# Patient Record
Sex: MALE | Race: WHITE | NOT HISPANIC OR LATINO | Employment: OTHER | URBAN - METROPOLITAN AREA
[De-identification: names, ages, dates, MRNs, and addresses within clinical notes are randomized per-mention and may not be internally consistent; named-entity substitution may affect disease eponyms.]

---

## 2017-04-11 ENCOUNTER — OFFICE VISIT (OUTPATIENT)
Dept: FAMILY MEDICINE CLINIC | Facility: CLINIC | Age: 57
End: 2017-04-11

## 2017-04-11 VITALS
DIASTOLIC BLOOD PRESSURE: 80 MMHG | SYSTOLIC BLOOD PRESSURE: 110 MMHG | TEMPERATURE: 98.1 F | HEART RATE: 100 BPM | BODY MASS INDEX: 33.36 KG/M2 | RESPIRATION RATE: 16 BRPM | WEIGHT: 233 LBS | OXYGEN SATURATION: 94 % | HEIGHT: 70 IN

## 2017-04-11 DIAGNOSIS — E78.2 MIXED HYPERLIPIDEMIA: Chronic | ICD-10-CM

## 2017-04-11 DIAGNOSIS — R73.9 HYPERGLYCEMIA: ICD-10-CM

## 2017-04-11 DIAGNOSIS — Z12.11 SCREEN FOR COLON CANCER: ICD-10-CM

## 2017-04-11 DIAGNOSIS — R00.0 TACHYCARDIA: ICD-10-CM

## 2017-04-11 DIAGNOSIS — Z12.5 SCREENING PSA (PROSTATE SPECIFIC ANTIGEN): ICD-10-CM

## 2017-04-11 DIAGNOSIS — N28.9 RENAL INSUFFICIENCY: Primary | ICD-10-CM

## 2017-04-11 DIAGNOSIS — E55.9 VITAMIN D DEFICIENCY: Chronic | ICD-10-CM

## 2017-04-11 DIAGNOSIS — Z00.00 LABORATORY EXAMINATION ORDERED AS PART OF A ROUTINE GENERAL MEDICAL EXAMINATION: ICD-10-CM

## 2017-04-11 DIAGNOSIS — Z86.718 HISTORY OF DVT (DEEP VEIN THROMBOSIS): ICD-10-CM

## 2017-04-11 PROCEDURE — 99213 OFFICE O/P EST LOW 20 MIN: CPT | Performed by: PHYSICIAN ASSISTANT

## 2017-04-11 RX ORDER — CLOMIPRAMINE HYDROCHLORIDE 75 MG/1
100 CAPSULE ORAL
COMMUNITY
Start: 2017-03-02 | End: 2019-01-16 | Stop reason: DRUGHIGH

## 2017-04-11 RX ORDER — ASPIRIN 81 MG/1
81 TABLET ORAL DAILY
COMMUNITY

## 2017-04-11 RX ORDER — CLONAZEPAM 0.5 MG/1
TABLET ORAL
COMMUNITY
Start: 2017-03-02

## 2017-04-11 RX ORDER — TRAZODONE HYDROCHLORIDE 50 MG/1
TABLET ORAL
COMMUNITY
Start: 2017-03-14

## 2017-04-11 RX ORDER — FLUVOXAMINE MALEATE 100 MG
TABLET ORAL
COMMUNITY
Start: 2017-03-07

## 2017-04-11 RX ORDER — OLANZAPINE 7.5 MG/1
TABLET ORAL
COMMUNITY
Start: 2017-03-02 | End: 2022-09-26 | Stop reason: SDUPTHER

## 2017-04-11 NOTE — PROGRESS NOTES
Subjective   Ronnie Baron is a 56 y.o. male.     History of Present Illness   Ronnie Baron 56 y.o. male who presents today for routine follow up check and medication refills.  he has a history of   Patient Active Problem List   Diagnosis   • Anxiety   • OCD (obsessive compulsive disorder)   • Severe recurrent major depression with psychotic features   • CKD (chronic kidney disease) stage 3, GFR 30-59 ml/min   • Hyperglycemia   • Hyperlipidemia   • Vitamin D deficiency   .  Since the last visit, he has overall felt well.  He has OCD and sees psych.  he has been compliant with current medications have reviewed them.  The patient denies medication side effects.    Est CC 91  Sees DR Leger for psych  Less hair on legs  Lab Results   Component Value Date    TSH 2.180 07/01/2016     Last free T4 was 1.82;  I will get free T3 this time also  No results found for: CHOL  Lab Results   Component Value Date    TRIG 142 07/01/2016     Lab Results   Component Value Date    HDL 47 07/01/2016     No results found for: LDLCALC  Lab Results   Component Value Date     (H) 07/01/2016     No results found for: HDLLDLRATIO  No components found for: CHOLHDL  Had DVT 7-18-16 left and do not see lab profile for this and do not see f/u doppler;  Will refer Hematology  The following portions of the patient's history were reviewed and updated as appropriate: allergies, current medications, past family history, past medical history, past social history, past surgical history and problem list.  Mom had clots to heart and brain  Heart rate stays above 100 and no symptoms  Had DVT last year and was on Eliquis and has not had f/u for this    Review of Systems   Constitutional: Positive for appetite change and unexpected weight change. Negative for activity change.   HENT: Negative for nosebleeds and trouble swallowing.    Eyes: Negative for pain and visual disturbance.   Respiratory: Negative for chest tightness, shortness of breath  and wheezing.    Cardiovascular: Negative for chest pain and palpitations.   Gastrointestinal: Negative for abdominal pain and blood in stool.   Endocrine: Negative.    Genitourinary: Negative for difficulty urinating and hematuria.   Musculoskeletal: Negative for joint swelling.   Skin: Negative for color change and rash.   Allergic/Immunologic: Negative.    Neurological: Negative for syncope and speech difficulty.   Hematological: Negative for adenopathy.   Psychiatric/Behavioral: Negative for agitation and confusion.   All other systems reviewed and are negative.      Objective   Physical Exam   Constitutional: He is oriented to person, place, and time. He appears well-developed and well-nourished. No distress.   HENT:   Head: Normocephalic and atraumatic.   Eyes: Conjunctivae and EOM are normal. Pupils are equal, round, and reactive to light. Right eye exhibits no discharge. Left eye exhibits no discharge. No scleral icterus.   Neck: Normal range of motion. Neck supple. No tracheal deviation present. No thyromegaly present.   Cardiovascular: Normal rate, regular rhythm, normal heart sounds, intact distal pulses and normal pulses.  Exam reveals no gallop.    No murmur heard.  Pulmonary/Chest: Effort normal and breath sounds normal. No respiratory distress. He has no wheezes. He has no rales.   Musculoskeletal: Normal range of motion.   Neurological: He is alert and oriented to person, place, and time. He exhibits normal muscle tone. Coordination normal.   Skin: Skin is warm. No rash noted. No erythema. No pallor.   Psychiatric: He has a normal mood and affect. His behavior is normal. Judgment and thought content normal.   Nursing note and vitals reviewed.      Assessment/Plan   Ronnie was seen today for anxiety.    Diagnoses and all orders for this visit:    Renal insufficiency  -     Cancel: Hepatitis C antibody  -     Comprehensive metabolic panel  -     Lipid panel  -     CBC and Differential  -     TSH  -      PSA  -     T4, Free  -     T3, Free  -     Vitamin D 25 Hydroxy  -     Hemoglobin A1c  -     HIV-1 / O / 2 Ag / Antibody 4th Generation    Hyperglycemia  -     Cancel: Hepatitis C antibody  -     Comprehensive metabolic panel  -     Lipid panel  -     CBC and Differential  -     TSH  -     PSA  -     T4, Free  -     T3, Free  -     Vitamin D 25 Hydroxy  -     Hemoglobin A1c  -     HIV-1 / O / 2 Ag / Antibody 4th Generation    Vitamin D deficiency  -     Cancel: Hepatitis C antibody  -     Comprehensive metabolic panel  -     Lipid panel  -     CBC and Differential  -     TSH  -     PSA  -     T4, Free  -     T3, Free  -     Vitamin D 25 Hydroxy  -     Hemoglobin A1c  -     HIV-1 / O / 2 Ag / Antibody 4th Generation    Mixed hyperlipidemia  -     Cancel: Hepatitis C antibody  -     Comprehensive metabolic panel  -     Lipid panel  -     CBC and Differential  -     TSH  -     PSA  -     T4, Free  -     T3, Free  -     Vitamin D 25 Hydroxy  -     Hemoglobin A1c  -     HIV-1 / O / 2 Ag / Antibody 4th Generation    Laboratory examination ordered as part of a routine general medical examination  -     Cancel: Hepatitis C antibody  -     Comprehensive metabolic panel  -     Lipid panel  -     CBC and Differential  -     TSH  -     PSA  -     T4, Free  -     T3, Free  -     Vitamin D 25 Hydroxy  -     Hemoglobin A1c  -     HIV-1 / O / 2 Ag / Antibody 4th Generation    Screening PSA (prostate specific antigen)  -     HIV-1 / O / 2 Ag / Antibody 4th Generation    History of DVT (deep vein thrombosis)  -     Duplex Venous Lower Extremity - Bilateral CAR; Future  -     Ambulatory Referral to Hematology / Oncology  -     HIV-1 / O / 2 Ag / Antibody 4th Generation    Tachycardia  -     Ambulatory Referral to Cardiology  -     HIV-1 / O / 2 Ag / Antibody 4th Generation

## 2017-04-11 NOTE — PATIENT INSTRUCTIONS
Need labs and send to psych  Low glycemic index diet  Exercise 30 minutes most days of the week  Make sure you get results on any labs or tests we ordered today  We discussed medications and how to take them as prescribed  Sleep 6-8 hours each night if possible  If you have not signed up for MyChart, please activate your code ASAP from your After Visit Summary today    LDL goal <100  LDL goal if heart disease <70  HDL goal >60  Triglyceride goal <150  BP goal =<130/80  Fasting glucose <100

## 2017-04-12 LAB
25(OH)D3+25(OH)D2 SERPL-MCNC: 41.6 NG/ML (ref 30–100)
ALBUMIN SERPL-MCNC: 4.5 G/DL (ref 3.5–5.2)
ALBUMIN/GLOB SERPL: 1.4 G/DL
ALP SERPL-CCNC: 125 U/L (ref 39–117)
ALT SERPL-CCNC: 40 U/L (ref 1–41)
AST SERPL-CCNC: 25 U/L (ref 1–40)
BASOPHILS # BLD AUTO: 0.03 10*3/MM3 (ref 0–0.2)
BASOPHILS NFR BLD AUTO: 0.6 % (ref 0–1.5)
BILIRUB SERPL-MCNC: 0.2 MG/DL (ref 0.1–1.2)
BUN SERPL-MCNC: 16 MG/DL (ref 6–20)
BUN/CREAT SERPL: 11.9 (ref 7–25)
CALCIUM SERPL-MCNC: 9.9 MG/DL (ref 8.6–10.5)
CHLORIDE SERPL-SCNC: 100 MMOL/L (ref 98–107)
CHOLEST SERPL-MCNC: 235 MG/DL (ref 0–200)
CO2 SERPL-SCNC: 25.2 MMOL/L (ref 22–29)
CREAT SERPL-MCNC: 1.34 MG/DL (ref 0.76–1.27)
EOSINOPHIL # BLD AUTO: 0.09 10*3/MM3 (ref 0–0.7)
EOSINOPHIL NFR BLD AUTO: 1.8 % (ref 0.3–6.2)
ERYTHROCYTE [DISTWIDTH] IN BLOOD BY AUTOMATED COUNT: 13.4 % (ref 11.5–14.5)
GLOBULIN SER CALC-MCNC: 3.2 GM/DL
GLUCOSE SERPL-MCNC: 106 MG/DL (ref 65–99)
HBA1C MFR BLD: 5.5 % (ref 4.8–5.6)
HCT VFR BLD AUTO: 52.7 % (ref 40.4–52.2)
HDLC SERPL-MCNC: 48 MG/DL (ref 40–60)
HGB BLD-MCNC: 17.5 G/DL (ref 13.7–17.6)
HIV 1+2 AB+HIV1 P24 AG SERPL QL IA: NON REACTIVE
IMM GRANULOCYTES # BLD: 0.03 10*3/MM3 (ref 0–0.03)
IMM GRANULOCYTES NFR BLD: 0.6 % (ref 0–0.5)
LDLC SERPL CALC-MCNC: 135 MG/DL (ref 0–100)
LYMPHOCYTES # BLD AUTO: 1.98 10*3/MM3 (ref 0.9–4.8)
LYMPHOCYTES NFR BLD AUTO: 40.5 % (ref 19.6–45.3)
MCH RBC QN AUTO: 31.4 PG (ref 27–32.7)
MCHC RBC AUTO-ENTMCNC: 33.2 G/DL (ref 32.6–36.4)
MCV RBC AUTO: 94.6 FL (ref 79.8–96.2)
MONOCYTES # BLD AUTO: 0.29 10*3/MM3 (ref 0.2–1.2)
MONOCYTES NFR BLD AUTO: 5.9 % (ref 5–12)
NEUTROPHILS # BLD AUTO: 2.47 10*3/MM3 (ref 1.9–8.1)
NEUTROPHILS NFR BLD AUTO: 50.6 % (ref 42.7–76)
PLATELET # BLD AUTO: 215 10*3/MM3 (ref 140–500)
POTASSIUM SERPL-SCNC: 4.9 MMOL/L (ref 3.5–5.2)
PROT SERPL-MCNC: 7.7 G/DL (ref 6–8.5)
PSA SERPL-MCNC: 1.97 NG/ML (ref 0–4)
RBC # BLD AUTO: 5.57 10*6/MM3 (ref 4.6–6)
SODIUM SERPL-SCNC: 140 MMOL/L (ref 136–145)
T3FREE SERPL-MCNC: 3.2 PG/ML (ref 2–4.4)
T4 FREE SERPL-MCNC: 1.09 NG/DL (ref 0.93–1.7)
TRIGL SERPL-MCNC: 258 MG/DL (ref 0–150)
TSH SERPL DL<=0.005 MIU/L-ACNC: 2.13 MIU/ML (ref 0.27–4.2)
VLDLC SERPL CALC-MCNC: 51.6 MG/DL (ref 5–40)
WBC # BLD AUTO: 4.89 10*3/MM3 (ref 4.5–10.7)

## 2017-04-13 ENCOUNTER — RESULTS ENCOUNTER (OUTPATIENT)
Dept: FAMILY MEDICINE CLINIC | Facility: CLINIC | Age: 57
End: 2017-04-13

## 2017-04-13 DIAGNOSIS — Z12.11 SCREEN FOR COLON CANCER: ICD-10-CM

## 2017-04-20 ENCOUNTER — LAB (OUTPATIENT)
Dept: LAB | Facility: HOSPITAL | Age: 57
End: 2017-04-20

## 2017-04-20 ENCOUNTER — CONSULT (OUTPATIENT)
Dept: ONCOLOGY | Facility: CLINIC | Age: 57
End: 2017-04-20

## 2017-04-20 ENCOUNTER — HOSPITAL ENCOUNTER (OUTPATIENT)
Dept: CARDIOLOGY | Facility: HOSPITAL | Age: 57
Discharge: HOME | End: 2017-04-20
Admitting: PHYSICIAN ASSISTANT

## 2017-04-20 VITALS
OXYGEN SATURATION: 95 % | DIASTOLIC BLOOD PRESSURE: 88 MMHG | TEMPERATURE: 97.7 F | HEIGHT: 69 IN | BODY MASS INDEX: 33.98 KG/M2 | SYSTOLIC BLOOD PRESSURE: 132 MMHG | RESPIRATION RATE: 16 BRPM | WEIGHT: 229.4 LBS | HEART RATE: 115 BPM

## 2017-04-20 DIAGNOSIS — Z86.718 HISTORY OF DEEP VEIN THROMBOSIS (DVT) OF LOWER EXTREMITY: Primary | ICD-10-CM

## 2017-04-20 DIAGNOSIS — I82.409 DEEP VEIN THROMBOSIS (DVT) OF LOWER EXTREMITY, UNSPECIFIED CHRONICITY, UNSPECIFIED LATERALITY, UNSPECIFIED VEIN (HCC): Primary | ICD-10-CM

## 2017-04-20 DIAGNOSIS — D75.1 ERYTHROCYTOSIS: ICD-10-CM

## 2017-04-20 DIAGNOSIS — Z86.718 HISTORY OF DVT (DEEP VEIN THROMBOSIS): ICD-10-CM

## 2017-04-20 LAB
ALBUMIN SERPL-MCNC: 4.3 G/DL (ref 3.5–5.2)
ALBUMIN/GLOB SERPL: 1.2 G/DL (ref 1.1–2.4)
ALP SERPL-CCNC: 119 U/L (ref 38–116)
ALT SERPL W P-5'-P-CCNC: 49 U/L (ref 0–41)
ANION GAP SERPL CALCULATED.3IONS-SCNC: 10.4 MMOL/L
AST SERPL-CCNC: 29 U/L (ref 0–40)
BASOPHILS # BLD AUTO: 0.07 10*3/MM3 (ref 0–0.1)
BASOPHILS NFR BLD AUTO: 1.1 % (ref 0–1.1)
BH CV LOW VAS LEFT POPLITEAL SPONT: 1
BH CV LOWER VASCULAR LEFT COMMON FEMORAL AUGMENT: NORMAL
BH CV LOWER VASCULAR LEFT COMMON FEMORAL COMPETENT: NORMAL
BH CV LOWER VASCULAR LEFT COMMON FEMORAL COMPRESS: NORMAL
BH CV LOWER VASCULAR LEFT COMMON FEMORAL PHASIC: NORMAL
BH CV LOWER VASCULAR LEFT COMMON FEMORAL SPONT: NORMAL
BH CV LOWER VASCULAR LEFT DISTAL FEMORAL COMPRESS: NORMAL
BH CV LOWER VASCULAR LEFT GASTRONEMIUS COMPRESS: NORMAL
BH CV LOWER VASCULAR LEFT GREATER SAPH AK COMPRESS: NORMAL
BH CV LOWER VASCULAR LEFT GREATER SAPH BK COMPRESS: NORMAL
BH CV LOWER VASCULAR LEFT MID FEMORAL AUGMENT: NORMAL
BH CV LOWER VASCULAR LEFT MID FEMORAL COMPETENT: NORMAL
BH CV LOWER VASCULAR LEFT MID FEMORAL COMPRESS: NORMAL
BH CV LOWER VASCULAR LEFT MID FEMORAL PHASIC: NORMAL
BH CV LOWER VASCULAR LEFT MID FEMORAL SPONT: NORMAL
BH CV LOWER VASCULAR LEFT PERONEAL COMPRESS: NORMAL
BH CV LOWER VASCULAR LEFT POPLITEAL AUGMENT: NORMAL
BH CV LOWER VASCULAR LEFT POPLITEAL COMPETENT: NORMAL
BH CV LOWER VASCULAR LEFT POPLITEAL COMPRESS: NORMAL
BH CV LOWER VASCULAR LEFT POPLITEAL PHASIC: NORMAL
BH CV LOWER VASCULAR LEFT POPLITEAL SPONT: NORMAL
BH CV LOWER VASCULAR LEFT POSTERIOR TIBIAL COMPRESS: NORMAL
BH CV LOWER VASCULAR LEFT PROXIMAL FEMORAL COMPRESS: NORMAL
BH CV LOWER VASCULAR LEFT SAPHENOFEMORAL JUNCTION AUGMENT: NORMAL
BH CV LOWER VASCULAR LEFT SAPHENOFEMORAL JUNCTION COMPETENT: NORMAL
BH CV LOWER VASCULAR LEFT SAPHENOFEMORAL JUNCTION COMPRESS: NORMAL
BH CV LOWER VASCULAR LEFT SAPHENOFEMORAL JUNCTION PHASIC: NORMAL
BH CV LOWER VASCULAR LEFT SAPHENOFEMORAL JUNCTION SPONT: NORMAL
BH CV LOWER VASCULAR RIGHT COMMON FEMORAL AUGMENT: NORMAL
BH CV LOWER VASCULAR RIGHT COMMON FEMORAL COMPETENT: NORMAL
BH CV LOWER VASCULAR RIGHT COMMON FEMORAL COMPRESS: NORMAL
BH CV LOWER VASCULAR RIGHT COMMON FEMORAL PHASIC: NORMAL
BH CV LOWER VASCULAR RIGHT COMMON FEMORAL SPONT: NORMAL
BH CV LOWER VASCULAR RIGHT DISTAL FEMORAL COMPRESS: NORMAL
BH CV LOWER VASCULAR RIGHT GASTRONEMIUS COMPRESS: NORMAL
BH CV LOWER VASCULAR RIGHT GREATER SAPH AK COMPRESS: NORMAL
BH CV LOWER VASCULAR RIGHT GREATER SAPH BK COMPRESS: NORMAL
BH CV LOWER VASCULAR RIGHT MID FEMORAL AUGMENT: NORMAL
BH CV LOWER VASCULAR RIGHT MID FEMORAL COMPETENT: NORMAL
BH CV LOWER VASCULAR RIGHT MID FEMORAL COMPRESS: NORMAL
BH CV LOWER VASCULAR RIGHT MID FEMORAL PHASIC: NORMAL
BH CV LOWER VASCULAR RIGHT MID FEMORAL SPONT: NORMAL
BH CV LOWER VASCULAR RIGHT PERONEAL COMPRESS: NORMAL
BH CV LOWER VASCULAR RIGHT POPLITEAL AUGMENT: NORMAL
BH CV LOWER VASCULAR RIGHT POPLITEAL COMPETENT: NORMAL
BH CV LOWER VASCULAR RIGHT POPLITEAL COMPRESS: NORMAL
BH CV LOWER VASCULAR RIGHT POPLITEAL PHASIC: NORMAL
BH CV LOWER VASCULAR RIGHT POPLITEAL SPONT: NORMAL
BH CV LOWER VASCULAR RIGHT POSTERIOR TIBIAL COMPRESS: NORMAL
BH CV LOWER VASCULAR RIGHT PROXIMAL FEMORAL COMPRESS: NORMAL
BH CV LOWER VASCULAR RIGHT SAPHENOFEMORAL JUNCTION AUGMENT: NORMAL
BH CV LOWER VASCULAR RIGHT SAPHENOFEMORAL JUNCTION COMPETENT: NORMAL
BH CV LOWER VASCULAR RIGHT SAPHENOFEMORAL JUNCTION COMPRESS: NORMAL
BH CV LOWER VASCULAR RIGHT SAPHENOFEMORAL JUNCTION PHASIC: NORMAL
BH CV LOWER VASCULAR RIGHT SAPHENOFEMORAL JUNCTION SPONT: NORMAL
BH CV POP FLUID COLLECT LEFT: 1
BILIRUB SERPL-MCNC: 0.3 MG/DL (ref 0.1–1.2)
BUN BLD-MCNC: 19 MG/DL (ref 6–20)
BUN/CREAT SERPL: 12.5 (ref 7.3–30)
CALCIUM SPEC-SCNC: 9.6 MG/DL (ref 8.5–10.2)
CHLORIDE SERPL-SCNC: 102 MMOL/L (ref 98–107)
CO2 SERPL-SCNC: 28.6 MMOL/L (ref 22–29)
CREAT BLD-MCNC: 1.52 MG/DL (ref 0.7–1.3)
DEPRECATED RDW RBC AUTO: 41.2 FL (ref 37–49)
EOSINOPHIL # BLD AUTO: 0.16 10*3/MM3 (ref 0–0.36)
EOSINOPHIL NFR BLD AUTO: 2.5 % (ref 1–5)
ERYTHROCYTE [DISTWIDTH] IN BLOOD BY AUTOMATED COUNT: 12.7 % (ref 11.7–14.5)
GFR SERPL CREATININE-BSD FRML MDRD: 48 ML/MIN/1.73
GLOBULIN UR ELPH-MCNC: 3.6 GM/DL (ref 1.8–3.5)
GLUCOSE BLD-MCNC: 90 MG/DL (ref 74–124)
HCT VFR BLD AUTO: 51.5 % (ref 40–49)
HGB BLD-MCNC: 18 G/DL (ref 13.5–16.5)
IMM GRANULOCYTES # BLD: 0.05 10*3/MM3 (ref 0–0.03)
IMM GRANULOCYTES NFR BLD: 0.8 % (ref 0–0.5)
LYMPHOCYTES # BLD AUTO: 1.93 10*3/MM3 (ref 1–3.5)
LYMPHOCYTES NFR BLD AUTO: 30.7 % (ref 20–49)
MCH RBC QN AUTO: 31.3 PG (ref 27–33)
MCHC RBC AUTO-ENTMCNC: 35 G/DL (ref 32–35)
MCV RBC AUTO: 89.4 FL (ref 83–97)
MONOCYTES # BLD AUTO: 0.43 10*3/MM3 (ref 0.25–0.8)
MONOCYTES NFR BLD AUTO: 6.8 % (ref 4–12)
NEUTROPHILS # BLD AUTO: 3.64 10*3/MM3 (ref 1.5–7)
NEUTROPHILS NFR BLD AUTO: 58.1 % (ref 39–75)
NRBC BLD MANUAL-RTO: 0 /100 WBC (ref 0–0)
PLATELET # BLD AUTO: 226 10*3/MM3 (ref 150–375)
PMV BLD AUTO: 10.5 FL (ref 8.9–12.1)
POTASSIUM BLD-SCNC: 4.7 MMOL/L (ref 3.5–4.7)
PROT SERPL-MCNC: 7.9 G/DL (ref 6.3–8)
RBC # BLD AUTO: 5.76 10*6/MM3 (ref 4.3–5.5)
SODIUM BLD-SCNC: 141 MMOL/L (ref 134–145)
WBC NRBC COR # BLD: 6.28 10*3/MM3 (ref 4–10)

## 2017-04-20 PROCEDURE — 80053 COMPREHEN METABOLIC PANEL: CPT | Performed by: INTERNAL MEDICINE

## 2017-04-20 PROCEDURE — 99244 OFF/OP CNSLTJ NEW/EST MOD 40: CPT | Performed by: INTERNAL MEDICINE

## 2017-04-20 PROCEDURE — 36415 COLL VENOUS BLD VENIPUNCTURE: CPT

## 2017-04-20 PROCEDURE — 85306 CLOT INHIBIT PROT S FREE: CPT | Performed by: INTERNAL MEDICINE

## 2017-04-20 PROCEDURE — 81270 JAK2 GENE: CPT | Performed by: INTERNAL MEDICINE

## 2017-04-20 PROCEDURE — 85303 CLOT INHIBIT PROT C ACTIVITY: CPT | Performed by: INTERNAL MEDICINE

## 2017-04-20 PROCEDURE — 93970 EXTREMITY STUDY: CPT

## 2017-04-20 PROCEDURE — 85025 COMPLETE CBC W/AUTO DIFF WBC: CPT

## 2017-04-20 PROCEDURE — 85300 ANTITHROMBIN III ACTIVITY: CPT | Performed by: INTERNAL MEDICINE

## 2017-04-20 NOTE — PROGRESS NOTES
Subjective .     REASON FOR CONSULTATION:  History of right calf vein thrombosis in July 2016, persistent mild erythrocytosis.  Provide an opinion on any further workup or treatment                             REQUESTING PHYSICIAN:  Ness Martinez PA-C    RECORDS OBTAINED:  Records of the patients history including those obtained from the referring provider were reviewed and summarized in detail.    HISTORY OF PRESENT ILLNESS:  The patient is a 56 y.o. year old male her for an initial visit regarding the above issues.  The patient reports that he experienced a right lower extremity DVT around age 15-16 after a leg injury from a skiing accident.  He reports being told to stay off of his feet 4.  Of time and had no further difficulty in that regard.  The accuracy of this is somewhat unclear.  He denies any known family history of venous thrombosis although does note his mother had atrial fibrillation and experienced a CVA.  The patient has a history of anxiety/OCD and has required psychiatric hospitalization at [facility] in December 2015 and subsequently at [facility] in July 2016.  Prior to and during that hospitalization, the patient had become quite sedentary.  He was found on Doppler study 7/18/16 to have a left popliteal fossa fluid collection as well as an acute left lower extremity DVT involving the calf veins (gastrocnemius and soleal veins).  He was seen by the hospitalist and was treated with Eliquis.  He continued on Eliquis for a 6 week period of time in total and then stopped the medication.  It was anticipated that he would undergo a follow-up Doppler however that was not performed.  Fortunately, he had no residual difficulty in terms of any lower extremity pain or swelling.  He is scheduled for a follow-up Doppler study later today.  He does note that around 2 weeks ago he began taking an aspirin 81 mg per day as a general health precaution.    In reviewing the patient's laboratory  studies, labs from 7/1/16 showed a white count of 10.4 with normal differential, hemoglobin 17.2, hematocrit 50.2, MCV 90.5, platelet count 231,000.  On 7/18/16 (at the time the DVT was identified), his hemoglobin was 15.4, hematocrit 45.5.  At his visit with Ness Juan 4/11/17, white count was 4.89, hemoglobin 17.5, hematocrit 52.7, MCV 94.6, platelet count 215,000.  The patient has not been receiving any testosterone replacement.  He quit smoking many years ago in 1992.  He has no known diagnosis of obstructive sleep apnea however his wife reports that he snores significantly at night.  He does have some minor a time somnolence although this may be related to his psychiatric medications.    The patient reports that his psychiatric symptoms are under good control at this point and he does continue routine psychiatric follow-up area he does complain of some paresthesias in his upper legs of that occur occasionally.  He has noticed a loss of hair growth on his distal lower extremities, recent thyroid function studies normal.  He denies any claudication symptoms.        Past Medical History:   Diagnosis Date   • Anxiety    • Chronic kidney disease    • Depression    • Hyperlipidemia    • OCD (obsessive compulsive disorder)    • Vitamin D deficiency      Past Surgical History:   Procedure Laterality Date   • ELBOW PROCEDURE  06/2014       MEDICATIONS    Current Outpatient Prescriptions:   •  aspirin 81 MG EC tablet, Take 81 mg by mouth Daily., Disp: , Rfl:   •  clomiPRAMINE (ANAFRANIL) 75 MG capsule, , Disp: , Rfl:   •  clonazePAM (KlonoPIN) 0.5 MG tablet, , Disp: , Rfl:   •  fluvoxaMINE (LUVOX) 100 MG tablet, , Disp: , Rfl:   •  gabapentin (NEURONTIN) 600 MG tablet, take 1 tablet by mouth three times a day, Disp: , Rfl: 0  •  Multiple Vitamins-Minerals (MENS MULTI VITAMIN & MINERAL PO), Take  by mouth., Disp: , Rfl:   •  OLANZapine (zyPREXA) 7.5 MG tablet, , Disp: , Rfl:   •  traZODone (DESYREL) 50 MG tablet, , Disp: ,  "Rfl:   •  Vitamin D, Cholecalciferol, 1000 UNITS tablet, Take 2,000 Units by mouth daily., Disp: , Rfl:     ALLERGIES:   No Known Allergies    SOCIAL HISTORY:       Social History     Social History   • Marital status:      Spouse name: N/A   • Number of children: N/A   • Years of education: N/A     Occupational History   • Not on file.     Social History Main Topics   • Smoking status: Former Smoker   • Smokeless tobacco: Former User     Quit date: 7/5/1990   • Alcohol use Yes      Comment: occasional   • Drug use: No   • Sexual activity: Not on file     Other Topics Concern   • Not on file     Social History Narrative         FAMILY HISTORY:  Family History   Problem Relation Age of Onset   • Hypertension Mother    • Hypertension Father        REVIEW OF SYSTEMS:  A comprehensive review of systems was performed and was negative except as mentioned above in the history of present illness.    Objective    Vitals:    04/20/17 1004   BP: 132/88   Pulse: 115   Resp: 16   Temp: 97.7 °F (36.5 °C)   TempSrc: Oral   SpO2: 95%   Weight: 229 lb 6.4 oz (104 kg)   Height: 69.29\" (176 cm)  Comment: new height   PainSc: 0-No pain     Current Status 4/20/2017   ECOG score 0      PHYSICAL EXAM:    GENERAL:  Well-developed, well-nourished in no acute distress.   SKIN:  Warm, dry without rashes, purpura or petechiae.  HEAD:  Normocephalic.  EYES:  Pupils equal, round and reactive to light.  EOMs intact.  Conjunctivae normal.  EARS:  Hearing intact.  NOSE:  Septum midline.  No excoriations or nasal discharge.  MOUTH:  Tongue is well-papillated; no stomatitis or ulcers.  Lips normal.  THROAT:  Oropharynx without lesions or exudates.  NECK:  Supple with good range of motion; no thyromegaly or masses, no JVD.  LYMPHATICS:  No cervical, supraclavicular, axillary or inguinal adenopathy.  CHEST:  Lungs clear to percussion and auscultation. Good airflow.  CARDIAC:  Regular rate and rhythm without murmurs, rubs or gallops. Normal " S1,S2.  ABDOMEN:  Soft, nontender with no organomegaly or masses.  EXTREMITIES:  No clubbing, cyanosis or edema.  NEUROLOGICAL:  Cranial Nerves II-XII grossly intact.  No focal neurological deficits.  PSYCHIATRIC:  Normal affect and mood.      RECENT LABS:        WBC   Date Value Ref Range Status   04/20/2017 6.28 4.00 - 10.00 10*3/mm3 Final     Hemoglobin   Date Value Ref Range Status   04/20/2017 18.0 (H) 13.5 - 16.5 g/dL Final     Platelets   Date Value Ref Range Status   04/20/2017 226 150 - 375 10*3/mm3 Final     Lab Results   Component Value Date    GLUCOSE 90 04/20/2017    BUN 19 04/20/2017    CREATININE 1.52 (H) 04/20/2017    EGFRIFNONA 48 (L) 04/20/2017    EGFRIFAFRI 67 04/11/2017    BCR 12.5 04/20/2017    K 4.7 04/20/2017    CO2 28.6 04/20/2017    CALCIUM 9.6 04/20/2017    PROTENTOTREF 7.7 04/11/2017    ALBUMIN 4.30 04/20/2017    LABIL2 1.2 04/20/2017    AST 29 04/20/2017    ALT 49 (H) 04/20/2017         Assessment/Plan    1. Erythrocytosis: As noted above, the patient's labs date back to 7/1/16 at which time he had a hemoglobin of 17.2, hematocrit 50.2.  This did improve into the normal range later in his hospitalization with labs at the time of identification of his left calf DVT 7/18/16 showing a hematocrit of 45.5.  Subsequent labs from 4/11/17 with Ness Martinez showed a hemoglobin of 17.5 with hematocrit 52.7.  His labs today show a persistent elevation in hemoglobin at 18 with hematocrit 51.5.  I suspect that he has a secondary erythrocytosis related to underlying obstructive sleep apnea.  We will, however, pursue additional evaluation given his history of thrombosis to evaluate for polycythemia vera with JAK2 mutation and abdominal ultrasound to rule out splenomegaly.  We will also check an EPO level and renal ultrasound to rule out underlying renal mass.  If this is a secondary erythrocytosis, one would not necessarily expect the modest elevation in hematocrit to precipitate thrombosis alone.  We will  recheck his hematocrit when he returns in 2 weeks.  I would not recommend phlebotomy at this time.  2. Left lower extremity DVT: The patient reports that he experienced a right lower extremity DVT around age 15-16 after having a right lower extremity injury from a skiing incident.  The accuracy of this history is unclear.  He reports being told to stay off of his feet for a period of time and he had no further issues.  During his psychiatric hospitalization in July 2016, he had been quite sedentary.  He was found to have a acute left lower extremity DVT involving the gastrocnemius and soleal veins and also had a left popliteal fossa fluid collection (presumed a Baker's cyst).  He was treated with Eliquis for 6 weeks and then discontinued the medication.  He had no further difficulty with any lower extremity swelling or pain.  He does have some bilateral thigh paresthesias which are likely unrelated.  He has been scheduled for repeat Doppler study later today by Ness Martinez and I certainly agree with proceeding.  There is no clinical evidence of residual no recurrent thrombosis at this time.  He did begin taking aspirin 81 mg a day for general health considerations recently on his own.  This might provide him with some additional benefit.  Given the questionable history of prior DVT occurring in young age, we are proceeding with a hypercoagulable evaluation today.  This may provide benefit for both the patient and his children (4 children with a daughter age 28, 3 sons ages 30, 25, 24).  3. Suspected obstructive sleep apnea: Agents wife reports that he snores significantly.  He does have some mild daytime somnolence.  This is suspected to be the cause of his erythrocytosis.  He will discuss with Ness Martinez possibility of undergoing evaluation.  4. Possible peripheral vascular disease: The patient reports experiencing hair loss in his distal lower extremities.  He has no specific symptoms consistent with claudication  however does have some paresthesias in his upper thighs.  At some point he may require evaluation with MEÑO.    Plan:    1. Labs today with carbon monoxide, EPO level, JAK2 mutation, factor V Leiden gene mutation, prothrombin gene mutation, anti-thrombin 3, protein C and S activity, lupus anticoagulant, anti-beta 2G P1 antibodies, anticardiolipin antibodies.  2. Proceed with previously scheduled lower extremity Doppler study today  3. Continue aspirin 81 mg a day for now  4. Abdominal ultrasound to evaluate for splenomegaly and renal mass  5. M.D. visit in 2 weeks with CBC.

## 2017-04-21 LAB
AT III PPP CHRO-ACNC: 109 % (ref 90–134)
CARDIOLIPIN IGG SER IA-ACNC: <9 GPL U/ML (ref 0–14)
CARDIOLIPIN IGM SER IA-ACNC: <9 MPL U/ML (ref 0–12)
ETHNIC BACKGROUND STATED: 11.1 MIU/ML (ref 2.6–18.5)
PROT C PPP-ACNC: 200 % (ref 86–163)
PROT S ACT/NOR PPP: 122 % (ref 70–127)

## 2017-04-22 LAB
B2 GLYCOPROT1 IGA SER-ACNC: <9 GPI IGA UNITS (ref 0–25)
B2 GLYCOPROT1 IGG SER-ACNC: <9 GPI IGG UNITS (ref 0–20)
B2 GLYCOPROT1 IGM SER-ACNC: <9 GPI IGM UNITS (ref 0–32)

## 2017-04-24 LAB
DRVVT IMM 1:2 NP PPP: 44.7 SEC (ref 0–44)
LA NT PLATELET PPP: 33.8 SEC (ref 0–43.6)
LUPUS ANTICOAGULANT REFLEX: ABNORMAL
SCREEN DRVVT: 1.3 RATIO (ref 0.8–1.2)
SCREEN DRVVT: 54 SEC (ref 0–44)

## 2017-04-25 ENCOUNTER — HOSPITAL ENCOUNTER (OUTPATIENT)
Dept: ULTRASOUND IMAGING | Facility: HOSPITAL | Age: 57
Discharge: HOME | End: 2017-04-25
Attending: INTERNAL MEDICINE | Admitting: INTERNAL MEDICINE

## 2017-04-25 DIAGNOSIS — D75.1 ERYTHROCYTOSIS: ICD-10-CM

## 2017-04-25 DIAGNOSIS — Z86.718 HISTORY OF DEEP VEIN THROMBOSIS (DVT) OF LOWER EXTREMITY: ICD-10-CM

## 2017-04-25 PROCEDURE — 76700 US EXAM ABDOM COMPLETE: CPT

## 2017-04-27 ENCOUNTER — OFFICE VISIT (OUTPATIENT)
Dept: CARDIOLOGY | Facility: CLINIC | Age: 57
End: 2017-04-27

## 2017-04-27 VITALS
SYSTOLIC BLOOD PRESSURE: 130 MMHG | HEART RATE: 108 BPM | WEIGHT: 233 LBS | HEIGHT: 69 IN | DIASTOLIC BLOOD PRESSURE: 94 MMHG | BODY MASS INDEX: 34.51 KG/M2

## 2017-04-27 DIAGNOSIS — R06.83 SNORES: ICD-10-CM

## 2017-04-27 DIAGNOSIS — R00.0 TACHYCARDIA: Primary | ICD-10-CM

## 2017-04-27 LAB
F2 GENE MUT ANL BLD/T: NORMAL
F5 GENE MUT ANL BLD/T: NORMAL
LABORATORY COMMENT REPORT: NORMAL
Lab: NORMAL

## 2017-04-27 PROCEDURE — 99243 OFF/OP CNSLTJ NEW/EST LOW 30: CPT | Performed by: INTERNAL MEDICINE

## 2017-04-27 PROCEDURE — 93000 ELECTROCARDIOGRAM COMPLETE: CPT | Performed by: INTERNAL MEDICINE

## 2017-04-27 NOTE — PROGRESS NOTES
Subjective:     Encounter Date:04/27/2017      Patient ID: Ronnie Baron is a 56 y.o. male.    Chief Complaint:  History of Present Illness    {Common H&P Review Areas:54684}    ROS    Procedures       Objective:     Physical Exam    Lab Review:       Assessment:          Diagnosis Plan   1. Tachycardia  Adult Transthoracic Echo Complete    Ambulatory Referral to Sleep Medicine    Holter Monitor - 24 Hour   2. Snores  Ambulatory Referral to Sleep Medicine    Holter Monitor - 24 Hour          Plan:

## 2017-04-27 NOTE — PROGRESS NOTES
Subjective:     Encounter Date:04/27/2017      Patient ID: Ronnie Baron is a 56 y.o. male.    Chief Complaint:Tachycardia.  History of Present Illness    56-year-old gentleman who presents for evaluation of tachycardia.  Patient's heart rate is noted to be increased in today to 108.  He is also found to have an increased H&H has been referred to the [facility] for that.  With the tachycardia however he was referred here for further evaluation.  Clinically from a car vascular standpoint he does fairly well.  No shortness of breath chest pain dizziness lightheadedness sensation of his heart racing skipped beats or any other problems.  His wife does say he snores quite extensively.  He was seen today for further evaluation.    Review of Systems   Constitution: Positive for malaise/fatigue.   All other systems reviewed and are negative.        ECG 12 Lead  Date/Time: 4/27/2017 3:57 PM  Performed by: YUNIOR BARRAZA  Authorized by: YUNIOR BARRAZA   Previous ECG: no previous ECG available  Rhythm: sinus tachycardia  Clinical impression: non-specific ECG               Objective:     Physical Exam   Constitutional: He is oriented to person, place, and time. He appears well-developed.   HENT:   Head: Normocephalic.   Eyes: Conjunctivae are normal.   Neck: Normal range of motion.   Cardiovascular: Normal rate, regular rhythm and normal heart sounds.    Pulmonary/Chest: Breath sounds normal.   Abdominal: Soft. Bowel sounds are normal.   Musculoskeletal: Normal range of motion. He exhibits no edema.   Neurological: He is alert and oriented to person, place, and time.   Skin: Skin is warm and dry.   Psychiatric: He has a normal mood and affect. His behavior is normal.   Vitals reviewed.      Lab Review:       Assessment:          Diagnosis Plan   1. Tachycardia  Adult Transthoracic Echo Complete    Ambulatory Referral to Sleep Medicine    Holter Monitor - 24 Hour   2. Snores  Ambulatory Referral to Sleep Medicine     Holter Monitor - 24 Hour          Plan:       1.  Tachycardia.  With his hematologic issues I believe he's classically set up for sleep apnea.  I set him up for a sleep study.  I would also do an echocardiogram to rule out any structural heart disease.  I elected to place a Holter monitor to see how his heart rates are averaging in 24 hours.  2.  Severe depression.  He has been hospitalized twice for his severe depression as well as OCD issues.  He said from that aspect he is making significant grounds and the increased heart rate has not been associated with any issues associated with these unfortunate diseases.

## 2017-05-05 LAB — REF LAB TEST METHOD: NORMAL

## 2017-05-09 ENCOUNTER — OFFICE VISIT (OUTPATIENT)
Dept: ONCOLOGY | Facility: CLINIC | Age: 57
End: 2017-05-09

## 2017-05-09 ENCOUNTER — HOSPITAL ENCOUNTER (OUTPATIENT)
Dept: CARDIOLOGY | Facility: HOSPITAL | Age: 57
Discharge: HOME | End: 2017-05-09
Attending: INTERNAL MEDICINE | Admitting: INTERNAL MEDICINE

## 2017-05-09 ENCOUNTER — LAB (OUTPATIENT)
Dept: LAB | Facility: HOSPITAL | Age: 57
End: 2017-05-09

## 2017-05-09 VITALS
WEIGHT: 233 LBS | SYSTOLIC BLOOD PRESSURE: 138 MMHG | HEIGHT: 69 IN | DIASTOLIC BLOOD PRESSURE: 94 MMHG | BODY MASS INDEX: 34.51 KG/M2

## 2017-05-09 VITALS
OXYGEN SATURATION: 95 % | HEIGHT: 69 IN | HEART RATE: 118 BPM | SYSTOLIC BLOOD PRESSURE: 118 MMHG | BODY MASS INDEX: 34.42 KG/M2 | WEIGHT: 232.4 LBS | DIASTOLIC BLOOD PRESSURE: 88 MMHG | RESPIRATION RATE: 16 BRPM | TEMPERATURE: 98 F

## 2017-05-09 DIAGNOSIS — Z86.718 HISTORY OF DEEP VEIN THROMBOSIS (DVT) OF LOWER EXTREMITY: ICD-10-CM

## 2017-05-09 DIAGNOSIS — D75.1 ERYTHROCYTOSIS: Primary | ICD-10-CM

## 2017-05-09 DIAGNOSIS — R00.0 TACHYCARDIA: ICD-10-CM

## 2017-05-09 DIAGNOSIS — D75.1 ERYTHROCYTOSIS: ICD-10-CM

## 2017-05-09 LAB
ASCENDING AORTA: 2.8 CM
BASOPHILS # BLD AUTO: 0.07 10*3/MM3 (ref 0–0.1)
BASOPHILS NFR BLD AUTO: 0.9 % (ref 0–1.1)
BH CV ECHO MEAS - ACS: 2 CM
BH CV ECHO MEAS - AO MAX PG (FULL): 0.56 MMHG
BH CV ECHO MEAS - AO MAX PG: 3.7 MMHG
BH CV ECHO MEAS - AO MEAN PG (FULL): 0.23 MMHG
BH CV ECHO MEAS - AO MEAN PG: 1.9 MMHG
BH CV ECHO MEAS - AO ROOT AREA (BSA CORRECTED): 1.8
BH CV ECHO MEAS - AO ROOT AREA: 11.7 CM^2
BH CV ECHO MEAS - AO ROOT DIAM: 3.9 CM
BH CV ECHO MEAS - AO V2 MAX: 96.4 CM/SEC
BH CV ECHO MEAS - AO V2 MEAN: 66.5 CM/SEC
BH CV ECHO MEAS - AO V2 VTI: 13.2 CM
BH CV ECHO MEAS - AVA(I,A): 3.5 CM^2
BH CV ECHO MEAS - AVA(I,D): 3.5 CM^2
BH CV ECHO MEAS - AVA(V,A): 3.4 CM^2
BH CV ECHO MEAS - AVA(V,D): 3.4 CM^2
BH CV ECHO MEAS - BSA(HAYCOCK): 2.2 M^2
BH CV ECHO MEAS - BSA: 2.2 M^2
BH CV ECHO MEAS - BZI_BMI: 32.5 KILOGRAMS/M^2
BH CV ECHO MEAS - BZI_METRIC_HEIGHT: 175.3 CM
BH CV ECHO MEAS - BZI_METRIC_WEIGHT: 99.8 KG
BH CV ECHO MEAS - CONTRAST EF (2CH): 62 ML/M^2
BH CV ECHO MEAS - CONTRAST EF 4CH: 66 ML/M^2
BH CV ECHO MEAS - EDV(MOD-SP2): 108 ML
BH CV ECHO MEAS - EDV(MOD-SP4): 97 ML
BH CV ECHO MEAS - EDV(TEICH): 120 ML
BH CV ECHO MEAS - EF(CUBED): 69.7 %
BH CV ECHO MEAS - EF(MOD-SP2): 62 %
BH CV ECHO MEAS - EF(MOD-SP4): 66 %
BH CV ECHO MEAS - EF(TEICH): 61 %
BH CV ECHO MEAS - ESV(MOD-SP2): 41 ML
BH CV ECHO MEAS - ESV(MOD-SP4): 33 ML
BH CV ECHO MEAS - ESV(TEICH): 46.8 ML
BH CV ECHO MEAS - FS: 32.8 %
BH CV ECHO MEAS - IVS/LVPW: 1.1
BH CV ECHO MEAS - IVSD: 0.92 CM
BH CV ECHO MEAS - LAT PEAK E' VEL: 7 CM/SEC
BH CV ECHO MEAS - LV DIASTOLIC VOL/BSA (35-75): 45.1 ML/M^2
BH CV ECHO MEAS - LV MASS(C)D: 156.1 GRAMS
BH CV ECHO MEAS - LV MASS(C)DI: 72.6 GRAMS/M^2
BH CV ECHO MEAS - LV MAX PG: 3.2 MMHG
BH CV ECHO MEAS - LV MEAN PG: 1.7 MMHG
BH CV ECHO MEAS - LV SYSTOLIC VOL/BSA (12-30): 15.3 ML/M^2
BH CV ECHO MEAS - LV V1 MAX: 88.8 CM/SEC
BH CV ECHO MEAS - LV V1 MEAN: 61.6 CM/SEC
BH CV ECHO MEAS - LV V1 VTI: 12.7 CM
BH CV ECHO MEAS - LVIDD: 5 CM
BH CV ECHO MEAS - LVIDS: 3.4 CM
BH CV ECHO MEAS - LVLD AP2: 8.2 CM
BH CV ECHO MEAS - LVLD AP4: 8.3 CM
BH CV ECHO MEAS - LVLS AP2: 6.9 CM
BH CV ECHO MEAS - LVLS AP4: 6.7 CM
BH CV ECHO MEAS - LVOT AREA (M): 3.8 CM^2
BH CV ECHO MEAS - LVOT AREA: 3.7 CM^2
BH CV ECHO MEAS - LVOT DIAM: 2.2 CM
BH CV ECHO MEAS - LVPWD: 0.85 CM
BH CV ECHO MEAS - MED PEAK E' VEL: 11 CM/SEC
BH CV ECHO MEAS - MV A DUR: 0.07 SEC
BH CV ECHO MEAS - MV A MAX VEL: 76.2 CM/SEC
BH CV ECHO MEAS - MV DEC SLOPE: 413.3 CM/SEC^2
BH CV ECHO MEAS - MV DEC TIME: 0.13 SEC
BH CV ECHO MEAS - MV E MAX VEL: 55.3 CM/SEC
BH CV ECHO MEAS - MV E/A: 0.73
BH CV ECHO MEAS - MV MAX PG: 4.4 MMHG
BH CV ECHO MEAS - MV MEAN PG: 2 MMHG
BH CV ECHO MEAS - MV P1/2T MAX VEL: 54.8 CM/SEC
BH CV ECHO MEAS - MV P1/2T: 38.8 MSEC
BH CV ECHO MEAS - MV V2 MAX: 104.5 CM/SEC
BH CV ECHO MEAS - MV V2 MEAN: 66.3 CM/SEC
BH CV ECHO MEAS - MV V2 VTI: 14.2 CM
BH CV ECHO MEAS - MVA P1/2T LCG: 4 CM^2
BH CV ECHO MEAS - MVA(P1/2T): 5.7 CM^2
BH CV ECHO MEAS - MVA(VTI): 3.3 CM^2
BH CV ECHO MEAS - PA ACC TIME: 0.09 SEC
BH CV ECHO MEAS - PA MAX PG (FULL): 5.5 MMHG
BH CV ECHO MEAS - PA MAX PG: 8.1 MMHG
BH CV ECHO MEAS - PA PR(ACCEL): 38.6 MMHG
BH CV ECHO MEAS - PA V2 MAX: 142.4 CM/SEC
BH CV ECHO MEAS - PULM A REVS DUR: 0.08 SEC
BH CV ECHO MEAS - PULM A REVS VEL: 39.5 CM/SEC
BH CV ECHO MEAS - PULM DIAS VEL: 64.7 CM/SEC
BH CV ECHO MEAS - PULM S/D: 0.6
BH CV ECHO MEAS - PULM SYS VEL: 39 CM/SEC
BH CV ECHO MEAS - PVA(V,A): 1.8 CM^2
BH CV ECHO MEAS - PVA(V,D): 1.8 CM^2
BH CV ECHO MEAS - QP/QS: 0.85
BH CV ECHO MEAS - RV MAX PG: 2.6 MMHG
BH CV ECHO MEAS - RV MEAN PG: 1.6 MMHG
BH CV ECHO MEAS - RV V1 MAX: 80 CM/SEC
BH CV ECHO MEAS - RV V1 MEAN: 60.1 CM/SEC
BH CV ECHO MEAS - RV V1 VTI: 12.5 CM
BH CV ECHO MEAS - RVOT AREA: 3.2 CM^2
BH CV ECHO MEAS - RVOT DIAM: 2 CM
BH CV ECHO MEAS - SI(AO): 72 ML/M^2
BH CV ECHO MEAS - SI(CUBED): 41.3 ML/M^2
BH CV ECHO MEAS - SI(LVOT): 21.6 ML/M^2
BH CV ECHO MEAS - SI(MOD-SP2): 31.1 ML/M^2
BH CV ECHO MEAS - SI(MOD-SP4): 29.8 ML/M^2
BH CV ECHO MEAS - SI(TEICH): 34 ML/M^2
BH CV ECHO MEAS - SUP REN AO DIAM: 2.3 CM
BH CV ECHO MEAS - SV(AO): 154.8 ML
BH CV ECHO MEAS - SV(CUBED): 88.8 ML
BH CV ECHO MEAS - SV(LVOT): 46.4 ML
BH CV ECHO MEAS - SV(MOD-SP2): 67 ML
BH CV ECHO MEAS - SV(MOD-SP4): 64 ML
BH CV ECHO MEAS - SV(RVOT): 39.6 ML
BH CV ECHO MEAS - SV(TEICH): 73.2 ML
BH CV ECHO MEAS - TAPSE (>1.6): 1.7 CM2
BH CV XLRA - TDI S': 25 CM/SEC
DEPRECATED RDW RBC AUTO: 41 FL (ref 37–49)
E/E' RATIO: 6.5
EOSINOPHIL # BLD AUTO: 0.07 10*3/MM3 (ref 0–0.36)
EOSINOPHIL NFR BLD AUTO: 0.9 % (ref 1–5)
ERYTHROCYTE [DISTWIDTH] IN BLOOD BY AUTOMATED COUNT: 12.5 % (ref 11.7–14.5)
HCT VFR BLD AUTO: 48.2 % (ref 40–49)
HGB BLD-MCNC: 17.3 G/DL (ref 13.5–16.5)
IMM GRANULOCYTES # BLD: 0.05 10*3/MM3 (ref 0–0.03)
IMM GRANULOCYTES NFR BLD: 0.7 % (ref 0–0.5)
LEFT ATRIUM VOLUME INDEX: 16 ML/M2
LV EF 2D ECHO EST: 66 %
LYMPHOCYTES # BLD AUTO: 2.11 10*3/MM3 (ref 1–3.5)
LYMPHOCYTES NFR BLD AUTO: 28.1 % (ref 20–49)
MCH RBC QN AUTO: 31.9 PG (ref 27–33)
MCHC RBC AUTO-ENTMCNC: 35.9 G/DL (ref 32–35)
MCV RBC AUTO: 88.9 FL (ref 83–97)
MONOCYTES # BLD AUTO: 0.47 10*3/MM3 (ref 0.25–0.8)
MONOCYTES NFR BLD AUTO: 6.3 % (ref 4–12)
NEUTROPHILS # BLD AUTO: 4.73 10*3/MM3 (ref 1.5–7)
NEUTROPHILS NFR BLD AUTO: 63.1 % (ref 39–75)
NRBC BLD MANUAL-RTO: 0 /100 WBC (ref 0–0)
PLATELET # BLD AUTO: 207 10*3/MM3 (ref 150–375)
PMV BLD AUTO: 10.5 FL (ref 8.9–12.1)
RBC # BLD AUTO: 5.42 10*6/MM3 (ref 4.3–5.5)
SINUS: 3.9 CM
STJ: 2.9 CM
WBC NRBC COR # BLD: 7.5 10*3/MM3 (ref 4–10)

## 2017-05-09 PROCEDURE — 36416 COLLJ CAPILLARY BLOOD SPEC: CPT | Performed by: INTERNAL MEDICINE

## 2017-05-09 PROCEDURE — 93306 TTE W/DOPPLER COMPLETE: CPT

## 2017-05-09 PROCEDURE — 99214 OFFICE O/P EST MOD 30 MIN: CPT | Performed by: INTERNAL MEDICINE

## 2017-05-09 PROCEDURE — 93306 TTE W/DOPPLER COMPLETE: CPT | Performed by: INTERNAL MEDICINE

## 2017-05-09 PROCEDURE — 85025 COMPLETE CBC W/AUTO DIFF WBC: CPT | Performed by: INTERNAL MEDICINE

## 2017-07-12 ENCOUNTER — RESULTS ENCOUNTER (OUTPATIENT)
Dept: FAMILY MEDICINE CLINIC | Facility: CLINIC | Age: 57
End: 2017-07-12

## 2017-07-12 DIAGNOSIS — Z00.00 LABORATORY EXAMINATION ORDERED AS PART OF A ROUTINE GENERAL MEDICAL EXAMINATION: ICD-10-CM

## 2017-07-12 DIAGNOSIS — R00.0 TACHYCARDIA: ICD-10-CM

## 2017-07-12 DIAGNOSIS — Z12.5 SCREENING PSA (PROSTATE SPECIFIC ANTIGEN): ICD-10-CM

## 2017-07-12 DIAGNOSIS — Z86.718 HISTORY OF DVT (DEEP VEIN THROMBOSIS): ICD-10-CM

## 2017-07-12 DIAGNOSIS — N28.9 RENAL INSUFFICIENCY: ICD-10-CM

## 2017-07-12 DIAGNOSIS — E55.9 VITAMIN D DEFICIENCY: Chronic | ICD-10-CM

## 2017-07-12 DIAGNOSIS — E78.2 MIXED HYPERLIPIDEMIA: Chronic | ICD-10-CM

## 2017-07-12 DIAGNOSIS — R73.9 HYPERGLYCEMIA: ICD-10-CM

## 2017-08-02 ENCOUNTER — LAB (OUTPATIENT)
Dept: LAB | Facility: HOSPITAL | Age: 57
End: 2017-08-02

## 2017-08-02 DIAGNOSIS — Z86.718 HISTORY OF DEEP VEIN THROMBOSIS (DVT) OF LOWER EXTREMITY: ICD-10-CM

## 2017-08-02 DIAGNOSIS — D75.1 ERYTHROCYTOSIS: ICD-10-CM

## 2017-08-02 LAB
BASOPHILS # BLD AUTO: 0.06 10*3/MM3 (ref 0–0.1)
BASOPHILS NFR BLD AUTO: 0.9 % (ref 0–1.1)
DEPRECATED RDW RBC AUTO: 43.2 FL (ref 37–49)
EOSINOPHIL # BLD AUTO: 0.11 10*3/MM3 (ref 0–0.36)
EOSINOPHIL NFR BLD AUTO: 1.7 % (ref 1–5)
ERYTHROCYTE [DISTWIDTH] IN BLOOD BY AUTOMATED COUNT: 12.7 % (ref 11.7–14.5)
HCT VFR BLD AUTO: 47.1 % (ref 40–49)
HGB BLD-MCNC: 16.2 G/DL (ref 13.5–16.5)
IMM GRANULOCYTES # BLD: 0.04 10*3/MM3 (ref 0–0.03)
IMM GRANULOCYTES NFR BLD: 0.6 % (ref 0–0.5)
LYMPHOCYTES # BLD AUTO: 2.08 10*3/MM3 (ref 1–3.5)
LYMPHOCYTES NFR BLD AUTO: 32.9 % (ref 20–49)
MCH RBC QN AUTO: 31.8 PG (ref 27–33)
MCHC RBC AUTO-ENTMCNC: 34.4 G/DL (ref 32–35)
MCV RBC AUTO: 92.4 FL (ref 83–97)
MONOCYTES # BLD AUTO: 0.47 10*3/MM3 (ref 0.25–0.8)
MONOCYTES NFR BLD AUTO: 7.4 % (ref 4–12)
NEUTROPHILS # BLD AUTO: 3.56 10*3/MM3 (ref 1.5–7)
NEUTROPHILS NFR BLD AUTO: 56.5 % (ref 39–75)
NRBC BLD MANUAL-RTO: 0 /100 WBC (ref 0–0)
PLATELET # BLD AUTO: 192 10*3/MM3 (ref 150–375)
PMV BLD AUTO: 10.2 FL (ref 8.9–12.1)
RBC # BLD AUTO: 5.1 10*6/MM3 (ref 4.3–5.5)
WBC NRBC COR # BLD: 6.32 10*3/MM3 (ref 4–10)

## 2017-08-02 PROCEDURE — 36415 COLL VENOUS BLD VENIPUNCTURE: CPT | Performed by: INTERNAL MEDICINE

## 2017-08-02 PROCEDURE — 85025 COMPLETE CBC W/AUTO DIFF WBC: CPT | Performed by: INTERNAL MEDICINE

## 2017-08-04 LAB
LA NT DPL PPP: 42 SEC (ref 0–55)
LA NT DPL/LA NT HPL PPP-RTO: 1.08 RATIO (ref 0–1.4)
LA NT PLATELET PPP: 30.2 SEC (ref 0–51.9)
LUPUS ANTICOAGULANT REFLEX: NORMAL
SCREEN DRVVT: 43 SEC (ref 0–47)
THROMBIN TIME: 20.8 SEC (ref 0–23)

## 2017-08-09 ENCOUNTER — OFFICE VISIT (OUTPATIENT)
Dept: ONCOLOGY | Facility: CLINIC | Age: 57
End: 2017-08-09

## 2017-08-09 ENCOUNTER — APPOINTMENT (OUTPATIENT)
Dept: LAB | Facility: HOSPITAL | Age: 57
End: 2017-08-09

## 2017-08-09 VITALS
RESPIRATION RATE: 14 BRPM | BODY MASS INDEX: 34.9 KG/M2 | DIASTOLIC BLOOD PRESSURE: 80 MMHG | TEMPERATURE: 98 F | HEART RATE: 96 BPM | OXYGEN SATURATION: 97 % | SYSTOLIC BLOOD PRESSURE: 126 MMHG | HEIGHT: 69 IN | WEIGHT: 235.6 LBS

## 2017-08-09 DIAGNOSIS — Z86.718 HISTORY OF DEEP VEIN THROMBOSIS (DVT) OF LOWER EXTREMITY: Primary | ICD-10-CM

## 2017-08-09 DIAGNOSIS — D75.1 ERYTHROCYTOSIS: ICD-10-CM

## 2017-08-09 PROCEDURE — 99213 OFFICE O/P EST LOW 20 MIN: CPT | Performed by: INTERNAL MEDICINE

## 2017-08-09 PROCEDURE — G0463 HOSPITAL OUTPT CLINIC VISIT: HCPCS | Performed by: INTERNAL MEDICINE

## 2017-08-09 NOTE — PROGRESS NOTES
Subjective      REASONS FOR FOLLOWUP:    1. History of left calf vein thrombosis in July 2016 treated with Eliquis ×6 weeks.  Transient positive lupus anticoagulant, does not meet criteria for antiphospholipid syndrome.  2. Right lower extremity DVT around age 15-16 after having a right lower extremity injury from a skiing incident.  3. Erythrocytosis, suspected secondary to obstructive sleep apnea.    HISTORY OF PRESENT ILLNESS:  The patient is a 57 y.o. year old male who is here for follow-up with the above-mentioned history.    History of Present Illness  the patient returns in follow-up today with laboratory studies to review.  He does continue on aspirin 81 mg per day.  In the interval since his last visit, he did not follow through with his sleep study.  He does note that he has been losing here on his bilateral distal lower extremities.  He denies any claudication symptoms.  He has no other complaints.    Past Medical History:   Diagnosis Date   • Alteration in appetite    • Anxiety    • CKD (chronic kidney disease) stage 3, GFR 30-59 ml/min    • Depression     severe recurrent major depression with psychotic features   • DVT (deep venous thrombosis)    • Hyperglycemia    • Hyperlipidemia    • OCD (obsessive compulsive disorder)    • Renal insufficiency    • Tachycardia    • Vitamin D deficiency      Past Surgical History:   Procedure Laterality Date   • ELBOW PROCEDURE  06/2014       Current Outpatient Prescriptions on File Prior to Visit   Medication Sig Dispense Refill   • aspirin 81 MG EC tablet Take 81 mg by mouth Daily.     • clonazePAM (KlonoPIN) 0.5 MG tablet      • fluvoxaMINE (LUVOX) 100 MG tablet      • gabapentin (NEURONTIN) 600 MG tablet take 1 tablet by mouth three times a day  0   • Multiple Vitamins-Minerals (MENS MULTI VITAMIN & MINERAL PO) Take  by mouth.     • OLANZapine (zyPREXA) 7.5 MG tablet      • traZODone (DESYREL) 50 MG tablet      • Vitamin D, Cholecalciferol, 1000 UNITS tablet  Take 2,000 Units by mouth daily.     • clomiPRAMINE (ANAFRANIL) 75 MG capsule 100 mg.       No current facility-administered medications on file prior to visit.        ALLERGIES:   No Known Allergies    Social History     Social History   • Marital status:      Spouse name: Arielle   • Number of children: N/A   • Years of education: High school     Occupational History   •       [employer]     Social History Main Topics   • Smoking status: Former Smoker     Packs/day: 1.00     Types: Cigarettes     Quit date: 1992   • Smokeless tobacco: Former User     Quit date: 7/5/1990   • Alcohol use Yes      Comment: occasional   • Drug use: No   • Sexual activity: Not on file     Other Topics Concern   • Not on file     Social History Narrative     Family History   Problem Relation Age of Onset   • Hypertension Mother    • Stroke Mother    • Hypertension Father    • Heart disease Maternal Grandfather    • Cancer Maternal Grandmother         Review of Systems   Constitutional: Negative for activity change, appetite change, fatigue, fever and unexpected weight change.   HENT: Negative for congestion, mouth sores, nosebleeds, sore throat and voice change.    Respiratory: Negative for cough, shortness of breath and wheezing.    Cardiovascular: Negative for chest pain, palpitations and leg swelling.   Gastrointestinal: Negative for abdominal distention, abdominal pain, blood in stool, constipation, diarrhea, nausea and vomiting.   Endocrine: Negative for cold intolerance and heat intolerance.   Genitourinary: Negative for difficulty urinating, dysuria, frequency and hematuria.   Musculoskeletal: Negative for arthralgias, back pain, joint swelling and myalgias.   Skin: Negative for rash.   Neurological: Negative for dizziness, syncope, weakness, light-headedness, numbness and headaches.   Hematological: Negative for adenopathy. Does not bruise/bleed easily.   Psychiatric/Behavioral: Negative for  confusion and sleep disturbance. The patient is not nervous/anxious.          Objective      Vitals:    08/09/17 1618   BP: 126/80   Pulse: 96   Resp: 14   Temp: 98 °F (36.7 °C)   SpO2: 97%      Current Status 8/9/2017   ECOG score 0       Physical Exam   Constitutional: He is oriented to person, place, and time. He appears well-developed and well-nourished.   HENT:   Mouth/Throat: Oropharynx is clear and moist.   Eyes: Conjunctivae are normal.   Neck: No thyromegaly present.   Cardiovascular: Normal rate and regular rhythm.  Exam reveals no gallop and no friction rub.    No murmur heard.  Pulmonary/Chest: Breath sounds normal. No respiratory distress.   Abdominal: Soft. Bowel sounds are normal. He exhibits no distension. There is no tenderness.   Musculoskeletal: He exhibits no edema.   Lymphadenopathy:        Head (right side): No submandibular adenopathy present.     He has no cervical adenopathy.     He has no axillary adenopathy.        Right: No inguinal and no supraclavicular adenopathy present.        Left: No inguinal and no supraclavicular adenopathy present.   Neurological: He is alert and oriented to person, place, and time. He displays normal reflexes. No cranial nerve deficit. He exhibits normal muscle tone.   Skin: Skin is warm and dry. No rash noted.   Hair loss involving the distal lower extremities   Psychiatric: He has a normal mood and affect. His behavior is normal.       RECENT LABS:  Hematology WBC   Date Value Ref Range Status   08/02/2017 6.32 4.00 - 10.00 10*3/mm3 Final     RBC   Date Value Ref Range Status   08/02/2017 5.10 4.30 - 5.50 10*6/mm3 Final     Hemoglobin   Date Value Ref Range Status   08/02/2017 16.2 13.5 - 16.5 g/dL Final     Hematocrit   Date Value Ref Range Status   08/02/2017 47.1 40.0 - 49.0 % Final     Platelets   Date Value Ref Range Status   08/02/2017 192 150 - 375 10*3/mm3 Final        Lab Results   Component Value Date    GLUCOSE 90 04/20/2017    BUN 19 04/20/2017     CREATININE 1.52 (H) 04/20/2017    EGFRIFNONA 48 (L) 04/20/2017    EGFRIFAFRI 67 04/11/2017    BCR 12.5 04/20/2017    K 4.7 04/20/2017    CO2 28.6 04/20/2017    CALCIUM 9.6 04/20/2017    PROTENTOTREF 7.7 04/11/2017    ALBUMIN 4.30 04/20/2017    LABIL2 1.2 04/20/2017    AST 29 04/20/2017    ALT 49 (H) 04/20/2017     Laboratory studies 4/20/17: Erythropoietin 11.1, JAK2 V617F and exon 12 negative, factor V Leiden negative, prothrombin gene mutation negative, antithrombin %, protein C activity 200%, protein S activity 122%, lupus anticoagulant positive, anti-beta 2G P1 antibodies negative, anticardiolipin antibodies negative.    Lower extremity Doppler 4/20/17 negative for any evidence of thrombosis, there was a left popliteal fluid collection.    Abdominal ultrasound 4/25/17: No evidence of splenomegaly, no evidence of renal mass lesion, suspected hepatic steatosis.    Echocardiogram 5/9/17 ejection fraction 66.8%.    Laboratory studies 8/2/17: Lupus anticoagulant negative    Assessment/Plan      1. Erythrocytosis suspected secondary to obstructive sleep apnea: the patient's labs date back to 7/1/16 at which time he had a hemoglobin of 17.2, hematocrit 50.2. This did improve into the normal range later in his hospitalization with labs at the time of identification of his left calf DVT 7/18/16 showing a hematocrit of 45.5. Subsequent labs from 4/11/17 with Ness Martinez showed a hemoglobin of 17.5 with hematocrit 52.7.  At time of initial evaluation in our office 4/20/17, hemoglobin was 18 with hematocrit 51.3.  He has high suspicion for underlying obstructive sleep apnea as the cause for his mild erythrocytosis.  We did perform additional evaluation 4/20/17 showing a low-normal EPO level at 11.1, negative JAK2 V617F and exon 12 mutation, abdominal ultrasound showing no evidence of splenomegaly, no evidence of renal mass lesion.  It is unlikely that this is related to his history of thrombosis.  I did suggest that he  undergo evaluation for obstructive sleep apnea however he has not yet followed through with this.  Recently however his hematocrit has normalized at 47.1 today.  2. Left lower extremity DVT: The patient reports that he experienced a right lower extremity DVT around age 15-16 after having a right lower extremity injury from a skiing incident. The accuracy of this history is unclear. He reports being told to stay off of his feet for a period of time and he had no further issues. During his psychiatric hospitalization in July 2016, he had been quite sedentary. He was found to have a acute left lower extremity DVT involving the gastrocnemius and soleal veins and also had a left popliteal fossa fluid collection (presumed a Baker's cyst). He was treated with Eliquis for 6 weeks and then discontinued the medication. He had no further difficulty with any lower extremity swelling or pain. He does have some bilateral thigh paresthesias which are likely unrelated. He did begin taking aspirin 81 mg a day for general health considerations on his own. Given the questionable history of prior DVT occurring at a young age, we did proceed with a hypercoagulable evaluation 4/20/17. This may provide benefit for both the patient and his children (4 children with a daughter age 28, 3 sons ages 30, 25, 24). Laboratory studies 4/20/17 showed factor V Leiden negative, prothrombin gene mutation negative, antithrombin %, protein C activity 200%, protein S activity 122%, lupus anticoagulant positive, anti-beta 2G P1 antibodies negative, anticardiolipin antibodies negative.  Due to the positive lupus anticoagulant, he returns today now 3 months later with repeat lupus anticoagulant from 8/2/17 that is negative.  Therefore he does not meet criteria for antiphospholipid syndrome.  We did discuss that today.  He is aware of signs and symptoms of recurrent DVT as well as of pulmonary embolism in the need to seek immediate medical attention  if the symptoms arise.  It appears that he may have some degree of peripheral vascular disease in his lower extremities given his hair loss and due to his prior history of DVT and potential peripheral vascular disease I did ask him to continue for now on aspirin 81 mg per day.  He does not require any specific further follow-up from a hematologic standpoint for this issue.  3. Bilateral lower extremity hair loss: This is concerning for possible underlying peripheral vascular disease.  He does not report any claudication type symptoms.  I did ask him to discuss this with primary care nurse practitioner Ness Martinez in terms of lower extremity arterial studies.    Plan:  1. Continue aspirin 81 mg per day  2. Follow-up with primary care nurse practitioner Ness Martinez in regards to evaluation for peripheral vascular disease  3. We will not schedule any further routine hematologic follow-up at this time.

## 2019-01-16 ENCOUNTER — RESULTS ENCOUNTER (OUTPATIENT)
Dept: FAMILY MEDICINE CLINIC | Facility: CLINIC | Age: 59
End: 2019-01-16

## 2019-01-16 ENCOUNTER — OFFICE VISIT (OUTPATIENT)
Dept: FAMILY MEDICINE CLINIC | Facility: CLINIC | Age: 59
End: 2019-01-16

## 2019-01-16 VITALS
TEMPERATURE: 97.9 F | RESPIRATION RATE: 16 BRPM | BODY MASS INDEX: 34.79 KG/M2 | OXYGEN SATURATION: 95 % | DIASTOLIC BLOOD PRESSURE: 74 MMHG | WEIGHT: 243 LBS | HEART RATE: 100 BPM | SYSTOLIC BLOOD PRESSURE: 114 MMHG | HEIGHT: 70 IN

## 2019-01-16 DIAGNOSIS — R06.83 SNORING: ICD-10-CM

## 2019-01-16 DIAGNOSIS — R00.0 SINUS TACHYCARDIA: ICD-10-CM

## 2019-01-16 DIAGNOSIS — D75.1 ERYTHROCYTOSIS: ICD-10-CM

## 2019-01-16 DIAGNOSIS — E55.9 VITAMIN D DEFICIENCY: Chronic | ICD-10-CM

## 2019-01-16 DIAGNOSIS — Z12.5 SCREENING PSA (PROSTATE SPECIFIC ANTIGEN): ICD-10-CM

## 2019-01-16 DIAGNOSIS — E78.2 MIXED HYPERLIPIDEMIA: Chronic | ICD-10-CM

## 2019-01-16 DIAGNOSIS — R73.9 HYPERGLYCEMIA: Primary | ICD-10-CM

## 2019-01-16 DIAGNOSIS — Z86.59 HISTORY OF DEPRESSION: ICD-10-CM

## 2019-01-16 DIAGNOSIS — F42.9 OBSESSIVE-COMPULSIVE DISORDER, UNSPECIFIED TYPE: ICD-10-CM

## 2019-01-16 DIAGNOSIS — N18.30 CKD (CHRONIC KIDNEY DISEASE) STAGE 3, GFR 30-59 ML/MIN (HCC): Chronic | ICD-10-CM

## 2019-01-16 DIAGNOSIS — L65.9 LOSS OF HAIR: ICD-10-CM

## 2019-01-16 DIAGNOSIS — R73.9 HYPERGLYCEMIA: ICD-10-CM

## 2019-01-16 DIAGNOSIS — R09.89 DECREASED PULSES IN FEET: ICD-10-CM

## 2019-01-16 DIAGNOSIS — Z12.11 SCREEN FOR COLON CANCER: ICD-10-CM

## 2019-01-16 PROCEDURE — 99214 OFFICE O/P EST MOD 30 MIN: CPT | Performed by: PHYSICIAN ASSISTANT

## 2019-01-16 NOTE — PATIENT INSTRUCTIONS
Low glycemic index diet  Exercise 30 minutes most days of the week  Make sure you get results on any labs or tests we ordered today  We discussed medications and how to take them as prescribed  Sleep 6-8 hours each night if possible  If you have not signed up for [employer], please activate your code ASAP from your After Visit Summary today    LDL goal <100  LDL goal if heart disease <70  HDL goal >60  Triglyceride goal <150  BP goal =<130/80  Fasting glucose <100

## 2019-01-16 NOTE — PROGRESS NOTES
Subjective   Rnonie Baron is a 58 y.o. male.     History of Present Illness   Ronnie Baron 58 y.o. male who presents today for routine follow up check and medication refills.  he has a history of   Patient Active Problem List   Diagnosis   • Anxiety   • OCD (obsessive compulsive disorder)   • Severe recurrent major depression with psychotic features (CMS/[facility])   • CKD (chronic kidney disease) stage 3, GFR 30-59 ml/min (CMS/[facility])   • Hyperglycemia   • Hyperlipidemia   • Vitamin D deficiency   • History of deep vein thrombosis (DVT) of lower extremity   • Erythrocytosis   .  Since the last visit, he has overall felt well.  He has Impaired fasting glucose and will continue close lab follow up to watch for DMII, Hyperlipidemia and working on this with diet and exercise and Vitamin D deficiency and will update labs to confirm level is at goal >30.  he has been compliant with current medications have reviewed them.  The patient denies medication side effects.  He is on ASA  Sees Dr. Leger for psych    Results for orders placed or performed in visit on 08/02/17   Lupus Anticoagulant   Result Value Ref Range    Dilute Prothrombin Time(dPT) 42.0 0.0 - 55.0 sec    dPT Confirm Ratio 1.08 0.00 - 1.40 Ratio    Thrombin Time 20.8 0.0 - 23.0 sec    PTT Lupus Anticoagulant 30.2 0.0 - 51.9 sec    Dilute Viper Venom Time 43.0 0.0 - 47.0 sec    Lupus Anticoagulant Reflex Comment:    CBC Auto Differential   Result Value Ref Range    WBC 6.32 4.00 - 10.00 10*3/mm3    RBC 5.10 4.30 - 5.50 10*6/mm3    Hemoglobin 16.2 13.5 - 16.5 g/dL    Hematocrit 47.1 40.0 - 49.0 %    MCV 92.4 83.0 - 97.0 fL    MCH 31.8 27.0 - 33.0 pg    MCHC 34.4 32.0 - 35.0 g/dL    RDW 12.7 11.7 - 14.5 %    RDW-SD 43.2 37.0 - 49.0 fl    MPV 10.2 8.9 - 12.1 fL    Platelets 192 150 - 375 10*3/mm3    Neutrophil % 56.5 39.0 - 75.0 %    Lymphocyte % 32.9 20.0 - 49.0 %    Monocyte % 7.4 4.0 - 12.0 %    Eosinophil % 1.7 1.0 - 5.0 %    Basophil % 0.9 0.0 - 1.1 %    Immature  Grans % 0.6 (H) 0.0 - 0.5 %    Neutrophils, Absolute 3.56 1.50 - 7.00 10*3/mm3    Lymphocytes, Absolute 2.08 1.00 - 3.50 10*3/mm3    Monocytes, Absolute 0.47 0.25 - 0.80 10*3/mm3    Eosinophils, Absolute 0.11 0.00 - 0.36 10*3/mm3    Basophils, Absolute 0.06 0.00 - 0.10 10*3/mm3    Immature Grans, Absolute 0.04 (H) 0.00 - 0.03 10*3/mm3    nRBC 0.0 0.0 - 0.0 /100 WBC   Last chol score 5.4%  Watching renal functions and avoid NSAIDs  Had CXR at  and told heart size enlg---see cardio for f/u  He has been to Dr Castanon for erythrocytosis and from sleep apnea  He is to have sleep study;  Other labs neg  Dr Castanon did suggest seeing vasc doc---no hair on lower ext  Has seen DR Rincon for tachycardia and need f/u and saw him for tachycardia; CXR last mo had mild cardiomegaly noted.    Snores and needs sleep study  The following portions of the patient's history were reviewed and updated as appropriate: allergies, current medications, past family history, past medical history, past social history, past surgical history and problem list.    Review of Systems   Constitutional: Negative for activity change, appetite change and unexpected weight change.   HENT: Negative for nosebleeds and trouble swallowing.    Eyes: Negative for pain and visual disturbance.   Respiratory: Negative for chest tightness, shortness of breath and wheezing.    Cardiovascular: Negative for chest pain and palpitations.   Gastrointestinal: Negative for abdominal pain and blood in stool.   Endocrine: Negative.    Genitourinary: Negative for difficulty urinating and hematuria.   Musculoskeletal: Negative for joint swelling.   Skin: Negative for color change and rash.   Allergic/Immunologic: Negative.    Neurological: Negative for syncope and speech difficulty.   Hematological: Negative for adenopathy.   Psychiatric/Behavioral: Negative for agitation and confusion.   All other systems reviewed and are negative.      Objective   Physical Exam    Constitutional: He is oriented to person, place, and time. He appears well-developed and well-nourished. No distress.   HENT:   Head: Normocephalic and atraumatic.   Eyes: Conjunctivae and EOM are normal. Pupils are equal, round, and reactive to light. Right eye exhibits no discharge. Left eye exhibits no discharge. No scleral icterus.   Neck: Normal range of motion. Neck supple. No tracheal deviation present. No thyromegaly present.   Cardiovascular: Normal rate, regular rhythm, normal heart sounds, intact distal pulses and normal pulses. Exam reveals no gallop.   No murmur heard.  Slightly decreased pulses in feet; sprig of hair only on great toe    tachycardia   Pulmonary/Chest: Effort normal and breath sounds normal. No respiratory distress. He has no wheezes. He has no rales.   Musculoskeletal: Normal range of motion.   Lymphadenopathy:     He has no cervical adenopathy.   Neurological: He is alert and oriented to person, place, and time. He exhibits normal muscle tone. Coordination normal.   Skin: Skin is warm. No rash noted. No erythema. No pallor.   Psychiatric: He has a normal mood and affect. His behavior is normal. Judgment and thought content normal.   Nursing note and vitals reviewed.      Assessment/Plan   Ronnie was seen today for anxiety.    Diagnoses and all orders for this visit:    Hyperglycemia  -     Comprehensive metabolic panel  -     Lipid panel  -     CBC and Differential  -     TSH  -     Hemoglobin A1c  -     T3, Free  -     T4, Free  -     Vitamin B12  -     Folate  -     Vitamin D 25 Hydroxy  -     Cologuard - Stool, Per Rectum; Future  -     PSA Screen  -     Ambulatory Referral to Cardiology  -     Ambulatory Referral to Vascular Surgery    Mixed hyperlipidemia  -     Comprehensive metabolic panel  -     Lipid panel  -     CBC and Differential  -     TSH  -     Hemoglobin A1c  -     T3, Free  -     T4, Free  -     Vitamin B12  -     Folate  -     Vitamin D 25 Hydroxy  -     Cologuard -  Stool, Per Rectum; Future  -     PSA Screen  -     Ambulatory Referral to Cardiology  -     Ambulatory Referral to Vascular Surgery    CKD (chronic kidney disease) stage 3, GFR 30-59 ml/min (CMS/[facility])  -     Comprehensive metabolic panel  -     Lipid panel  -     CBC and Differential  -     TSH  -     Hemoglobin A1c  -     T3, Free  -     T4, Free  -     Vitamin B12  -     Folate  -     Vitamin D 25 Hydroxy  -     Cologuard - Stool, Per Rectum; Future  -     PSA Screen  -     Ambulatory Referral to Cardiology  -     Ambulatory Referral to Vascular Surgery    Vitamin D deficiency  -     Comprehensive metabolic panel  -     Lipid panel  -     CBC and Differential  -     TSH  -     Hemoglobin A1c  -     T3, Free  -     T4, Free  -     Vitamin B12  -     Folate  -     Vitamin D 25 Hydroxy  -     Cologuard - Stool, Per Rectum; Future  -     PSA Screen  -     Ambulatory Referral to Cardiology  -     Ambulatory Referral to Vascular Surgery    Obsessive-compulsive disorder, unspecified type  -     Comprehensive metabolic panel  -     Lipid panel  -     CBC and Differential  -     TSH  -     Hemoglobin A1c  -     T3, Free  -     T4, Free  -     Vitamin B12  -     Folate  -     Vitamin D 25 Hydroxy  -     Cologuard - Stool, Per Rectum; Future  -     PSA Screen  -     Ambulatory Referral to Cardiology  -     Ambulatory Referral to Vascular Surgery    History of depression  -     Comprehensive metabolic panel  -     Lipid panel  -     CBC and Differential  -     TSH  -     Hemoglobin A1c  -     T3, Free  -     T4, Free  -     Vitamin B12  -     Folate  -     Vitamin D 25 Hydroxy  -     Cologuard - Stool, Per Rectum; Future  -     PSA Screen  -     Ambulatory Referral to Cardiology  -     Ambulatory Referral to Vascular Surgery    Screen for colon cancer  -     Comprehensive metabolic panel  -     Lipid panel  -     CBC and Differential  -     TSH  -     Hemoglobin A1c  -     T3, Free  -     T4, Free  -     Vitamin B12  -      Folate  -     Vitamin D 25 Hydroxy  -     Cologuard - Stool, Per Rectum; Future  -     PSA Screen  -     Ambulatory Referral to Cardiology  -     Ambulatory Referral to Vascular Surgery    Screening PSA (prostate specific antigen)  -     Comprehensive metabolic panel  -     Lipid panel  -     CBC and Differential  -     TSH  -     Hemoglobin A1c  -     T3, Free  -     T4, Free  -     Vitamin B12  -     Folate  -     Vitamin D 25 Hydroxy  -     Cologuard - Stool, Per Rectum; Future  -     PSA Screen  -     Ambulatory Referral to Cardiology  -     Ambulatory Referral to Vascular Surgery    Loss of hair  -     Comprehensive metabolic panel  -     Lipid panel  -     CBC and Differential  -     TSH  -     Hemoglobin A1c  -     T3, Free  -     T4, Free  -     Vitamin B12  -     Folate  -     Vitamin D 25 Hydroxy  -     Cologuard - Stool, Per Rectum; Future  -     PSA Screen  -     Ambulatory Referral to Cardiology  -     Ambulatory Referral to Vascular Surgery    Erythrocytosis  -     Comprehensive metabolic panel  -     Lipid panel  -     CBC and Differential  -     TSH  -     Hemoglobin A1c  -     T3, Free  -     T4, Free  -     Vitamin B12  -     Folate  -     Vitamin D 25 Hydroxy  -     Cologuard - Stool, Per Rectum; Future  -     PSA Screen  -     Ambulatory Referral to Cardiology  -     Ambulatory Referral to Vascular Surgery    Sinus tachycardia  -     Comprehensive metabolic panel  -     Lipid panel  -     CBC and Differential  -     TSH  -     Hemoglobin A1c  -     T3, Free  -     T4, Free  -     Vitamin B12  -     Folate  -     Vitamin D 25 Hydroxy  -     Cologuard - Stool, Per Rectum; Future  -     PSA Screen  -     Ambulatory Referral to Cardiology  -     Ambulatory Referral to Vascular Surgery    Decreased pulses in feet  -     Comprehensive metabolic panel  -     Lipid panel  -     CBC and Differential  -     TSH  -     Hemoglobin A1c  -     T3, Free  -     T4, Free  -     Vitamin B12  -     Folate  -      Vitamin D 25 Hydroxy  -     Cologuard - Stool, Per Rectum; Future  -     PSA Screen  -     Ambulatory Referral to Cardiology  -     Ambulatory Referral to Vascular Surgery    Snoring  -     Ambulatory Referral to Sleep Medicine

## 2019-01-17 LAB
25(OH)D3+25(OH)D2 SERPL-MCNC: 59.8 NG/ML (ref 30–100)
ALBUMIN SERPL-MCNC: 4.1 G/DL (ref 3.5–5.2)
ALBUMIN/GLOB SERPL: 1.4 G/DL
ALP SERPL-CCNC: 96 U/L (ref 39–117)
ALT SERPL-CCNC: 37 U/L (ref 1–41)
AST SERPL-CCNC: 21 U/L (ref 1–40)
BASOPHILS # BLD AUTO: 0.03 10*3/MM3 (ref 0–0.2)
BASOPHILS NFR BLD AUTO: 0.5 % (ref 0–1.5)
BILIRUB SERPL-MCNC: <0.2 MG/DL (ref 0.1–1.2)
BUN SERPL-MCNC: 23 MG/DL (ref 6–20)
BUN/CREAT SERPL: 14.5 (ref 7–25)
CALCIUM SERPL-MCNC: 9.7 MG/DL (ref 8.6–10.5)
CHLORIDE SERPL-SCNC: 102 MMOL/L (ref 98–107)
CHOLEST SERPL-MCNC: 296 MG/DL (ref 0–200)
CO2 SERPL-SCNC: 26.3 MMOL/L (ref 22–29)
CREAT SERPL-MCNC: 1.59 MG/DL (ref 0.76–1.27)
EOSINOPHIL # BLD AUTO: 0.23 10*3/MM3 (ref 0–0.7)
EOSINOPHIL NFR BLD AUTO: 3.8 % (ref 0.3–6.2)
ERYTHROCYTE [DISTWIDTH] IN BLOOD BY AUTOMATED COUNT: 13.8 % (ref 11.5–14.5)
FOLATE SERPL-MCNC: >20 NG/ML (ref 4.78–24.2)
GLOBULIN SER CALC-MCNC: 2.9 GM/DL
GLUCOSE SERPL-MCNC: 96 MG/DL (ref 65–99)
HBA1C MFR BLD: 5.7 % (ref 4.8–5.6)
HCT VFR BLD AUTO: 49.4 % (ref 40.4–52.2)
HDLC SERPL-MCNC: 51 MG/DL (ref 40–60)
HGB BLD-MCNC: 16.2 G/DL (ref 13.7–17.6)
IMM GRANULOCYTES # BLD AUTO: 0.04 10*3/MM3 (ref 0–0.03)
IMM GRANULOCYTES NFR BLD AUTO: 0.7 % (ref 0–0.5)
LDLC SERPL CALC-MCNC: 200 MG/DL (ref 0–100)
LYMPHOCYTES # BLD AUTO: 2.74 10*3/MM3 (ref 0.9–4.8)
LYMPHOCYTES NFR BLD AUTO: 45.1 % (ref 19.6–45.3)
MCH RBC QN AUTO: 31.6 PG (ref 27–32.7)
MCHC RBC AUTO-ENTMCNC: 32.8 G/DL (ref 32.6–36.4)
MCV RBC AUTO: 96.3 FL (ref 79.8–96.2)
MONOCYTES # BLD AUTO: 0.47 10*3/MM3 (ref 0.2–1.2)
MONOCYTES NFR BLD AUTO: 7.7 % (ref 5–12)
NEUTROPHILS # BLD AUTO: 2.6 10*3/MM3 (ref 1.9–8.1)
NEUTROPHILS NFR BLD AUTO: 42.9 % (ref 42.7–76)
PLATELET # BLD AUTO: 209 10*3/MM3 (ref 140–500)
POTASSIUM SERPL-SCNC: 4.9 MMOL/L (ref 3.5–5.2)
PROT SERPL-MCNC: 7 G/DL (ref 6–8.5)
PSA SERPL-MCNC: 2.01 NG/ML (ref 0–4)
RBC # BLD AUTO: 5.13 10*6/MM3 (ref 4.6–6)
SODIUM SERPL-SCNC: 141 MMOL/L (ref 136–145)
T3FREE SERPL-MCNC: 3.2 PG/ML (ref 2–4.4)
T4 FREE SERPL-MCNC: 1.14 NG/DL (ref 0.93–1.7)
TRIGL SERPL-MCNC: 223 MG/DL (ref 0–150)
TSH SERPL DL<=0.005 MIU/L-ACNC: 5.65 MIU/ML (ref 0.27–4.2)
VIT B12 SERPL-MCNC: 757 PG/ML (ref 211–946)
VLDLC SERPL CALC-MCNC: 44.6 MG/DL (ref 5–40)
WBC # BLD AUTO: 6.07 10*3/MM3 (ref 4.5–10.7)

## 2019-01-17 NOTE — PROGRESS NOTES
Pt notified via [facility]    PT is coming in  On the 24th    Can he wait on starting the meds till then??

## 2019-01-24 ENCOUNTER — OFFICE VISIT (OUTPATIENT)
Dept: FAMILY MEDICINE CLINIC | Facility: CLINIC | Age: 59
End: 2019-01-24

## 2019-01-24 ENCOUNTER — OFFICE VISIT (OUTPATIENT)
Dept: CARDIOLOGY | Facility: CLINIC | Age: 59
End: 2019-01-24

## 2019-01-24 VITALS
OXYGEN SATURATION: 98 % | DIASTOLIC BLOOD PRESSURE: 78 MMHG | HEIGHT: 70 IN | BODY MASS INDEX: 34.36 KG/M2 | SYSTOLIC BLOOD PRESSURE: 122 MMHG | HEART RATE: 102 BPM | WEIGHT: 240 LBS

## 2019-01-24 VITALS
OXYGEN SATURATION: 99 % | HEART RATE: 111 BPM | HEIGHT: 70 IN | WEIGHT: 238 LBS | SYSTOLIC BLOOD PRESSURE: 118 MMHG | TEMPERATURE: 97.9 F | DIASTOLIC BLOOD PRESSURE: 84 MMHG | BODY MASS INDEX: 34.07 KG/M2

## 2019-01-24 DIAGNOSIS — R73.01 IMPAIRED FASTING GLUCOSE: ICD-10-CM

## 2019-01-24 DIAGNOSIS — I51.7 MILD CARDIOMEGALY: ICD-10-CM

## 2019-01-24 DIAGNOSIS — N18.30 CKD (CHRONIC KIDNEY DISEASE) STAGE 3, GFR 30-59 ML/MIN (HCC): Chronic | ICD-10-CM

## 2019-01-24 DIAGNOSIS — E03.9 ACQUIRED HYPOTHYROIDISM: Primary | ICD-10-CM

## 2019-01-24 DIAGNOSIS — R06.83 SNORES: Primary | ICD-10-CM

## 2019-01-24 DIAGNOSIS — E78.2 MIXED HYPERLIPIDEMIA: Chronic | ICD-10-CM

## 2019-01-24 PROCEDURE — 99214 OFFICE O/P EST MOD 30 MIN: CPT | Performed by: NURSE PRACTITIONER

## 2019-01-24 PROCEDURE — 93000 ELECTROCARDIOGRAM COMPLETE: CPT | Performed by: NURSE PRACTITIONER

## 2019-01-24 PROCEDURE — 99214 OFFICE O/P EST MOD 30 MIN: CPT | Performed by: PHYSICIAN ASSISTANT

## 2019-01-24 RX ORDER — ROSUVASTATIN CALCIUM 10 MG/1
TABLET, COATED ORAL
Qty: 30 TABLET | Refills: 11 | Status: SHIPPED | OUTPATIENT
Start: 2019-01-24 | End: 2019-10-23 | Stop reason: SDUPTHER

## 2019-01-24 RX ORDER — LEVOTHYROXINE SODIUM 0.03 MG/1
25 TABLET ORAL DAILY
Qty: 30 TABLET | Refills: 11 | Status: SHIPPED | OUTPATIENT
Start: 2019-01-24 | End: 2019-12-31 | Stop reason: SDUPTHER

## 2019-01-24 NOTE — PROGRESS NOTES
Subjective   Ronnie Baron is a 58 y.o. male.     History of Present Illness   Ronnie Baron 58 y.o. male who presents today for routine follow up check and medication refills.  he has a history of   Patient Active Problem List   Diagnosis   • Anxiety   • OCD (obsessive compulsive disorder)   • Severe recurrent major depression with psychotic features (CMS/[facility])   • CKD (chronic kidney disease) stage 3, GFR 30-59 ml/min (CMS/HCC)   • Hyperglycemia   • Hyperlipidemia   • Vitamin D deficiency   • History of deep vein thrombosis (DVT) of lower extremity   • Erythrocytosis   • Acquired hypothyroidism   .  Since the last visit, he has overall felt tired.  He has Impaired fasting glucose and will continue close lab follow up to watch for DMII, Hyperlipidemia and here to discuss treatment, Hypothyroidism and will need to update labs for continued treatment, Vitamin D deficiency and well controlled on medication and labs at goal >30 and I will start thyroid med and statin.  What myalgias.  he has been compliant with current medications have reviewed them.  The patient denies medication side effects.  NO FAM hx thyroid  He is getting worked up for sleep apnea  Results for orders placed or performed in visit on 01/16/19   Comprehensive metabolic panel   Result Value Ref Range    Glucose 96 65 - 99 mg/dL    BUN 23 (H) 6 - 20 mg/dL    Creatinine 1.59 (H) 0.76 - 1.27 mg/dL    eGFR Non African Am 45 (L) >60 mL/min/1.73    eGFR African Am 55 (L) >60 mL/min/1.73    BUN/Creatinine Ratio 14.5 7.0 - 25.0    Sodium 141 136 - 145 mmol/L    Potassium 4.9 3.5 - 5.2 mmol/L    Chloride 102 98 - 107 mmol/L    Total CO2 26.3 22.0 - 29.0 mmol/L    Calcium 9.7 8.6 - 10.5 mg/dL    Total Protein 7.0 6.0 - 8.5 g/dL    Albumin 4.10 3.50 - 5.20 g/dL    Globulin 2.9 gm/dL    A/G Ratio 1.4 g/dL    Total Bilirubin <0.2 0.1 - 1.2 mg/dL    Alkaline Phosphatase 96 39 - 117 U/L    AST (SGOT) 21 1 - 40 U/L    ALT (SGPT) 37 1 - 41 U/L   Lipid panel   Result  Value Ref Range    Total Cholesterol 296 (H) 0 - 200 mg/dL    Triglycerides 223 (H) 0 - 150 mg/dL    HDL Cholesterol 51 40 - 60 mg/dL    VLDL Cholesterol 44.6 (H) 5 - 40 mg/dL    LDL Cholesterol  200 (H) 0 - 100 mg/dL   TSH   Result Value Ref Range    TSH 5.650 (H) 0.270 - 4.200 mIU/mL   Hemoglobin A1c   Result Value Ref Range    Hemoglobin A1C 5.70 (H) 4.80 - 5.60 %   T3, Free   Result Value Ref Range    T3, Free 3.2 2.0 - 4.4 pg/mL   T4, Free   Result Value Ref Range    Free T4 1.14 0.93 - 1.70 ng/dL   Vitamin B12   Result Value Ref Range    Vitamin B-12 757 211 - 946 pg/mL   Folate   Result Value Ref Range    Folate >20.00 4.78 - 24.20 ng/mL   Vitamin D 25 Hydroxy   Result Value Ref Range    25 Hydroxy, Vitamin D 59.8 30.0 - 100.0 ng/ml   PSA Screen   Result Value Ref Range    PSA 2.010 0.000 - 4.000 ng/mL   CBC and Differential   Result Value Ref Range    WBC 6.07 4.50 - 10.70 10*3/mm3    RBC 5.13 4.60 - 6.00 10*6/mm3    Hemoglobin 16.2 13.7 - 17.6 g/dL    Hematocrit 49.4 40.4 - 52.2 %    MCV 96.3 (H) 79.8 - 96.2 fL    MCH 31.6 27.0 - 32.7 pg    MCHC 32.8 32.6 - 36.4 g/dL    RDW 13.8 11.5 - 14.5 %    Platelets 209 140 - 500 10*3/mm3    Neutrophil Rel % 42.9 42.7 - 76.0 %    Lymphocyte Rel % 45.1 19.6 - 45.3 %    Monocyte Rel % 7.7 5.0 - 12.0 %    Eosinophil Rel % 3.8 0.3 - 6.2 %    Basophil Rel % 0.5 0.0 - 1.5 %    Neutrophils Absolute 2.60 1.90 - 8.10 10*3/mm3    Lymphocytes Absolute 2.74 0.90 - 4.80 10*3/mm3    Monocytes Absolute 0.47 0.20 - 1.20 10*3/mm3    Eosinophils Absolute 0.23 0.00 - 0.70 10*3/mm3    Basophils Absolute 0.03 0.00 - 0.20 10*3/mm3    Immature Granulocyte Rel % 0.7 (H) 0.0 - 0.5 %    Immature Grans Absolute 0.04 (H) 0.00 - 0.03 10*3/mm3       Creat above 1.5 X 2 now and consult nephrologist  The following portions of the patient's history were reviewed and updated as appropriate: allergies, current medications, past family history, past medical history, past social history, past surgical  history and problem list.    Review of Systems   Constitutional: Positive for fatigue. Negative for activity change, appetite change and unexpected weight change.   HENT: Negative for nosebleeds and trouble swallowing.    Eyes: Negative for pain and visual disturbance.   Respiratory: Negative for chest tightness, shortness of breath and wheezing.    Cardiovascular: Negative for chest pain and palpitations.   Gastrointestinal: Negative for abdominal pain and blood in stool.   Endocrine: Negative.    Genitourinary: Negative for difficulty urinating and hematuria.   Musculoskeletal: Negative for joint swelling.   Skin: Negative for color change and rash.   Allergic/Immunologic: Negative.    Neurological: Negative for syncope and speech difficulty.   Hematological: Negative for adenopathy.   Psychiatric/Behavioral: Negative for agitation and confusion.   All other systems reviewed and are negative.      Objective   Physical Exam   Constitutional: He is oriented to person, place, and time. He appears well-developed and well-nourished. No distress.   HENT:   Head: Normocephalic and atraumatic.   Eyes: Conjunctivae and EOM are normal. Pupils are equal, round, and reactive to light. Right eye exhibits no discharge. Left eye exhibits no discharge. No scleral icterus.   Neck: Normal range of motion. Neck supple. No tracheal deviation present. No thyromegaly present.   Cardiovascular: Normal rate, regular rhythm, normal heart sounds, intact distal pulses and normal pulses. Exam reveals no gallop.   No murmur heard.  Pulmonary/Chest: Effort normal and breath sounds normal. No respiratory distress. He has no wheezes. He has no rales.   Musculoskeletal: Normal range of motion.   Neurological: He is alert and oriented to person, place, and time. He exhibits normal muscle tone. Coordination normal.   Skin: Skin is warm. No rash noted. No erythema. No pallor.   Psychiatric: He has a normal mood and affect. His behavior is normal.  Judgment and thought content normal.   Nursing note and vitals reviewed.      Assessment/Plan   Diagnoses and all orders for this visit:    Acquired hypothyroidism  -     Comprehensive metabolic panel; Future  -     Lipid panel; Future  -     T3, Free; Future  -     T4, Free; Future  -     TSH; Future  -     Ambulatory Referral to Nephrology    Mixed hyperlipidemia  -     Comprehensive metabolic panel; Future  -     Lipid panel; Future  -     T3, Free; Future  -     T4, Free; Future  -     TSH; Future  -     Ambulatory Referral to Nephrology    Impaired fasting glucose  -     Comprehensive metabolic panel; Future  -     Lipid panel; Future  -     T3, Free; Future  -     T4, Free; Future  -     TSH; Future  -     Ambulatory Referral to Nephrology    CKD (chronic kidney disease) stage 3, GFR 30-59 ml/min (CMS/[facility])  -     Comprehensive metabolic panel; Future  -     Lipid panel; Future  -     T3, Free; Future  -     T4, Free; Future  -     TSH; Future  -     Ambulatory Referral to Nephrology    Other orders  -     levothyroxine (SYNTHROID, LEVOTHROID) 25 MCG tablet; Take 1 tablet by mouth Daily. One PO daily before breakfast for thyroid  -     rosuvastatin (CRESTOR) 10 MG tablet; 1/2 tab PO daily for 1 week then one PO daily For cholesterol

## 2019-01-24 NOTE — PROGRESS NOTES
Patient Name: Ronnie Baron  :1960  Age: 58 y.o.  Primary Cardiologist: Chris Rincon MD  Encounter Provider:  CONSUELO Portillo      Chief Complaint:   Chief Complaint   Patient presents with   • Follow-up         HPI  Ronnie Baron is a 58 y.o. male with a history significant for hyperlipidemia, stage III renal disease.  Patient was last seen by Dr. Rincon for tachycardia.  He presents today for routine follow-up.  Patient is new to me but I have reviewed prior medical records.  Patient states that he has done well over the past year to 2 years.  He reports that he was recently seen at an urgent care center for bronchitis and was told that he had cardiomegaly on a chest x-ray.  He presents today for further evaluation.  Patient states that after being treated for the bronchitis he is no longer short of breath.  He denies any chest pain, heart palpitations, swelling, fatigue.  He does note that he was seen by his primary care physician who noted that his lipids were elevated and his TSH was elevated.  He reports that he has an appointment with his primary care physician to get placed on medications for both of these problems.  Patient does note that he never got a sleep study done.  His wife notes that he snores and does have episodes of apnea.      The following portions of the patient's history were reviewed and updated as appropriate: allergies, current medications, past family history, past medical history, past social history, past surgical history and problem list.    Current Outpatient Medications on File Prior to Visit   Medication Sig   • aspirin 81 MG EC tablet Take 81 mg by mouth Daily.   • CLOMIPRAMINE HCL PO Take 100 mg by mouth Every Night.   • clonazePAM (KlonoPIN) 0.5 MG tablet    • fluvoxaMINE (LUVOX) 100 MG tablet    • gabapentin (NEURONTIN) 600 MG tablet take 1 tablet by mouth TWICE a day   • Multiple Vitamins-Minerals (MENS MULTI VITAMIN & MINERAL PO) Take  by mouth.   •  "OLANZapine (zyPREXA) 7.5 MG tablet    • traZODone (DESYREL) 50 MG tablet    • Vitamin D, Cholecalciferol, 1000 UNITS tablet Take 2,000 Units by mouth daily.     No current facility-administered medications on file prior to visit.          Review of Systems   Constitution: Negative for malaise/fatigue.   Cardiovascular: Negative for chest pain and leg swelling.   Respiratory: Positive for snoring. Negative for shortness of breath.    Neurological: Negative for light-headedness.   Psychiatric/Behavioral: Positive for depression. The patient is nervous/anxious.    All other systems reviewed and are negative.      OBJECTIVE:   Vital Signs  Vitals:    01/24/19 1322   BP: 122/78   Pulse: 102   SpO2: 98%     Estimated body mass index is 34.44 kg/m² as calculated from the following:    Height as of this encounter: 177.8 cm (70\").    Weight as of this encounter: 109 kg (240 lb).    Physical Exam   Constitutional: He is oriented to person, place, and time. Vital signs are normal. He appears well-developed and well-nourished.   Eyes: Conjunctivae are normal.   Neck: Carotid bruit is not present.   Cardiovascular: Normal rate, regular rhythm and normal heart sounds.   No murmur heard.  Pulmonary/Chest: Effort normal and breath sounds normal.   Abdominal: Normal appearance.   Musculoskeletal: Normal range of motion.   No pedal edema   Neurological: He is alert and oriented to person, place, and time. GCS eye subscore is 4. GCS verbal subscore is 5. GCS motor subscore is 6.   Skin: Skin is warm and dry.   Psychiatric: He has a normal mood and affect. His speech is normal and behavior is normal. Judgment and thought content normal. Cognition and memory are normal.         ECG 12 Lead  Date/Time: 1/24/2019 1:30 PM  Performed by: Cinthia Vasquez APRN  Authorized by: Cinthia Vasquez APRN   Comparison: compared with previous ECG from 4/27/2017  Rhythm: sinus tachycardia  Rate: tachycardic  BPM: 101  Conduction: conduction " normal  QRS axis: right  Other findings: PRWP  Clinical impression: abnormal ECG            Cardiac Procedures:  1. Echocardiogram 5/9/17: Normal ventricular function.  EF 66%.  Normal left ventricular cavity size and wall thickness.  All left ventricular wall segments contract normally.  2. 24-hour Holter 5/9/17: Relatively benign monitor study.        ASSESSMENT:      Diagnosis Plan   1. Snores  Home Sleep Study   2. Mixed hyperlipidemia     3. Mild cardiomegaly           PLAN OF CARE:     1. Snoring: Patient reports that he never did undergo sleep study and states that he would like to be evaluated for sleep apnea.  Patient's wife notes that he does have episodes of apnea when she is watched him sleep in the past.  He does also snort and has daytime sleepiness.  Home sleep study order placed.  2. Hyperlipidemia: Patient states that he recently had his lipids checked on 1/16/19 and was found to have a total cholesterol 296, triglycerides 233, HDL 51, VLDL 44.6, .  He has appointment with his primary care physician tomorrow to be started on medication for high cholesterol.  3. Mild cardiomegaly: Patient states that he was informed at a primary care Center that he had an enlarged heart on chest x-ray.  Patient had an echocardiogram performed 5/9/17 where his ventricles and atrium were all normal sizes.  Patient does not have any shortness of breath or edema.  Patient does not have a history of hypertension.  I do not have access to the chest x-ray images or report.  It is possible that this could have been from x-ray technique.  Since patient has had a recent echocardiogram with normal chamber sizes and is asymptomatic I will not order any further evaluation.  4. Follow-up with Dr. Rincon in one year or sooner with any problems.          Thank you for allowing me to participate in the care of your patient,      Sincerely,   CONSUELO Portillo  [facility]  01/24/19  2:03 PM    **Dragon  Disclaimer:**  Much of this encounter note is an electronic transcription/translation of spoken language to printed text. The electronic translation of spoken language may permit erroneous, or at times, nonsensical words or phrases to be inadvertently transcribed. Although I have reviewed the note for such errors, some may still exist.

## 2019-01-24 NOTE — PATIENT INSTRUCTIONS
Hypothyroidism  Hypothyroidism is a disorder of the thyroid. The thyroid is a large gland that is located in the lower front of the neck. The thyroid releases hormones that control how the body works. With hypothyroidism, the thyroid does not make enough of these hormones.  What are the causes?  Causes of hypothyroidism may include:  · Viral infections.  · Pregnancy.  · Your own defense system (immune system) attacking your thyroid.  · Certain medicines.  · Birth defects.  · Past radiation treatments to your head or neck.  · Past treatment with radioactive iodine.  · Past surgical removal of part or all of your thyroid.  · Problems with the gland that is located in the center of your brain (pituitary).    What are the signs or symptoms?  Signs and symptoms of hypothyroidism may include:  · Feeling as though you have no energy (lethargy).  · Inability to tolerate cold.  · Weight gain that is not explained by a change in diet or exercise habits.  · Dry skin.  · Coarse hair.  · Menstrual irregularity.  · Slowing of thought processes.  · Constipation.  · Sadness or depression.    How is this diagnosed?  Your health care provider may diagnose hypothyroidism with blood tests and ultrasound tests.  How is this treated?  Hypothyroidism is treated with medicine that replaces the hormones that your body does not make. After you begin treatment, it may take several weeks for symptoms to go away.  Follow these instructions at home:  · Take medicines only as directed by your health care provider.  · If you start taking any new medicines, tell your health care provider.  · Keep all follow-up visits as directed by your health care provider. This is important. As your condition improves, your dosage needs may change. You will need to have blood tests regularly so that your health care provider can watch your condition.  Contact a health care provider if:  · Your symptoms do not get better with treatment.  · You are taking thyroid  replacement medicine and:  ? You sweat excessively.  ? You have tremors.  ? You feel anxious.  ? You lose weight rapidly.  ? You cannot tolerate heat.  ? You have emotional swings.  ? You have diarrhea.  ? You feel weak.  Get help right away if:  · You develop chest pain.  · You develop an irregular heartbeat.  · You develop a rapid heartbeat.  This information is not intended to replace advice given to you by your health care provider. Make sure you discuss any questions you have with your health care provider.  Document Released: 12/18/2006 Document Revised: 05/25/2017 Document Reviewed: 05/05/2015  [employer] Interactive Patient Education © 2018 [employer]    Low glycemic index diet  Exercise 30 minutes most days of the week  Make sure you get results on any labs or tests we ordered today  We discussed medications and how to take them as prescribed  Sleep 6-8 hours each night if possible  If you have not signed up for [employer], please activate your code ASAP from your After Visit Summary today    LDL goal <100  LDL goal if heart disease <70  HDL goal >60  Triglyceride goal <150  BP goal =<130/80  Fasting glucose <100    Labs 2mos

## 2019-02-26 ENCOUNTER — HOSPITAL ENCOUNTER (OUTPATIENT)
Dept: SLEEP MEDICINE | Facility: HOSPITAL | Age: 59
Discharge: HOME | End: 2019-02-26
Admitting: NURSE PRACTITIONER

## 2019-02-26 DIAGNOSIS — R06.83 SNORES: ICD-10-CM

## 2019-02-26 PROCEDURE — 95806 SLEEP STUDY UNATT&RESP EFFT: CPT

## 2019-02-26 PROCEDURE — 95806 SLEEP STUDY UNATT&RESP EFFT: CPT | Performed by: INTERNAL MEDICINE

## 2019-03-04 ENCOUNTER — TELEPHONE (OUTPATIENT)
Dept: SLEEP MEDICINE | Facility: HOSPITAL | Age: 59
End: 2019-03-04

## 2019-03-04 NOTE — TELEPHONE ENCOUNTER
Left message for pt at both home and mobile numbers about HST results and Dr. Telles's recommendation for set up with CPAP device.  Asked pt to call back to sleep lab to discuss results in detail and to make compliance appointment.  Set up faxed to [facility] with orders for oximetry. link

## 2019-03-05 ENCOUNTER — TELEPHONE (OUTPATIENT)
Dept: SLEEP MEDICINE | Facility: HOSPITAL | Age: 59
End: 2019-03-05

## 2019-03-05 NOTE — TELEPHONE ENCOUNTER
Spoke to pt's wife about SS results and CPAP setup. Orders were faxed to [facility] yesterday. Pt's compliance appt set for 4/24/19 at 11:15.  Pt does not often answer his cell phone.  Pt's wife states she can be contacted at work Chloe Baron [phone]. [email or web address]

## 2019-03-07 ENCOUNTER — TRANSCRIBE ORDERS (OUTPATIENT)
Dept: ADMINISTRATIVE | Facility: HOSPITAL | Age: 59
End: 2019-03-07

## 2019-03-07 DIAGNOSIS — N18.9 CHRONIC KIDNEY DISEASE, UNSPECIFIED CKD STAGE: Primary | ICD-10-CM

## 2019-04-09 DIAGNOSIS — N18.30 CKD (CHRONIC KIDNEY DISEASE) STAGE 3, GFR 30-59 ML/MIN (HCC): Chronic | ICD-10-CM

## 2019-04-09 DIAGNOSIS — E78.2 MIXED HYPERLIPIDEMIA: Chronic | ICD-10-CM

## 2019-04-09 DIAGNOSIS — R73.01 IMPAIRED FASTING GLUCOSE: ICD-10-CM

## 2019-04-09 DIAGNOSIS — E03.9 ACQUIRED HYPOTHYROIDISM: ICD-10-CM

## 2019-04-18 ENCOUNTER — HOSPITAL ENCOUNTER (OUTPATIENT)
Dept: OTHER | Facility: HOSPITAL | Age: 59
Discharge: HOME | End: 2019-04-18

## 2019-04-18 LAB
ALBUMIN SERPL-MCNC: 3.9 G/DL (ref 3.5–5)
ALBUMIN/GLOB SERPL: 1.2 {RATIO} (ref 1.4–2.6)
ALP SERPL-CCNC: 104 U/L (ref 56–119)
ALT SERPL-CCNC: 30 U/L (ref 10–40)
ANION GAP SERPL CALC-SCNC: 16 MMOL/L (ref 8–19)
AST SERPL-CCNC: 21 U/L (ref 15–50)
BILIRUB SERPL-MCNC: 0.23 MG/DL (ref 0.2–1.3)
BUN SERPL-MCNC: 20 MG/DL (ref 5–25)
BUN/CREAT SERPL: 13 {RATIO} (ref 6–20)
CALCIUM SERPL-MCNC: 9.1 MG/DL (ref 8.7–10.4)
CHLORIDE SERPL-SCNC: 102 MMOL/L (ref 99–111)
CHOLEST SERPL-MCNC: 145 MG/DL (ref 107–200)
CHOLEST/HDLC SERPL: 3.2 {RATIO} (ref 3–6)
CONV CO2: 25 MMOL/L (ref 22–32)
CONV TOTAL PROTEIN: 7.1 G/DL (ref 6.3–8.2)
CREAT UR-MCNC: 1.58 MG/DL (ref 0.7–1.2)
GFR SERPLBLD BASED ON 1.73 SQ M-ARVRAT: 48 ML/MIN/{1.73_M2}
GLOBULIN UR ELPH-MCNC: 3.2 G/DL (ref 2–3.5)
GLUCOSE SERPL-MCNC: 96 MG/DL (ref 70–99)
HDLC SERPL-MCNC: 46 MG/DL (ref 40–60)
LDLC SERPL CALC-MCNC: 67 MG/DL (ref 70–100)
OSMOLALITY SERPL CALC.SUM OF ELEC: 290 MOSM/KG (ref 273–304)
POTASSIUM SERPL-SCNC: 4.2 MMOL/L (ref 3.5–5.3)
SODIUM SERPL-SCNC: 139 MMOL/L (ref 135–147)
T4 FREE SERPL-MCNC: 1.2 NG/DL (ref 0.9–1.8)
TRIGL SERPL-MCNC: 161 MG/DL (ref 40–150)
TSH SERPL-ACNC: 1.98 M[IU]/L (ref 0.27–4.2)
VLDLC SERPL-MCNC: 32 MG/DL (ref 5–37)

## 2019-04-19 LAB — T3FREE SERPL-MCNC: 3.3 PG/ML (ref 2–4.4)

## 2019-04-24 ENCOUNTER — OFFICE VISIT (OUTPATIENT)
Dept: FAMILY MEDICINE CLINIC | Facility: CLINIC | Age: 59
End: 2019-04-24

## 2019-04-24 ENCOUNTER — OFFICE VISIT (OUTPATIENT)
Dept: SLEEP MEDICINE | Facility: HOSPITAL | Age: 59
End: 2019-04-24

## 2019-04-24 VITALS
WEIGHT: 249 LBS | BODY MASS INDEX: 35.65 KG/M2 | OXYGEN SATURATION: 94 % | DIASTOLIC BLOOD PRESSURE: 80 MMHG | HEIGHT: 70 IN | TEMPERATURE: 97.8 F | RESPIRATION RATE: 16 BRPM | HEART RATE: 88 BPM | SYSTOLIC BLOOD PRESSURE: 130 MMHG

## 2019-04-24 VITALS — HEIGHT: 70 IN | BODY MASS INDEX: 35.24 KG/M2 | WEIGHT: 246.2 LBS

## 2019-04-24 DIAGNOSIS — E78.2 MIXED HYPERLIPIDEMIA: Chronic | ICD-10-CM

## 2019-04-24 DIAGNOSIS — G47.14 HYPERSOMNIA DUE TO MEDICAL CONDITION: Primary | ICD-10-CM

## 2019-04-24 DIAGNOSIS — IMO0002 SLEEP-RELATED HYPOVENTILATION: ICD-10-CM

## 2019-04-24 DIAGNOSIS — G47.33 OBSTRUCTIVE SLEEP APNEA: ICD-10-CM

## 2019-04-24 DIAGNOSIS — E55.9 VITAMIN D DEFICIENCY: Chronic | ICD-10-CM

## 2019-04-24 DIAGNOSIS — R73.9 HYPERGLYCEMIA: ICD-10-CM

## 2019-04-24 DIAGNOSIS — E03.9 HYPOTHYROIDISM, ACQUIRED: Primary | ICD-10-CM

## 2019-04-24 DIAGNOSIS — Z86.718 HISTORY OF DEEP VEIN THROMBOSIS (DVT) OF LOWER EXTREMITY: ICD-10-CM

## 2019-04-24 DIAGNOSIS — E66.01 CLASS 2 SEVERE OBESITY DUE TO EXCESS CALORIES WITH SERIOUS COMORBIDITY AND BODY MASS INDEX (BMI) OF 35.0 TO 35.9 IN ADULT (HCC): ICD-10-CM

## 2019-04-24 DIAGNOSIS — N18.30 CKD (CHRONIC KIDNEY DISEASE) STAGE 3, GFR 30-59 ML/MIN (HCC): Chronic | ICD-10-CM

## 2019-04-24 PROCEDURE — 99243 OFF/OP CNSLTJ NEW/EST LOW 30: CPT | Performed by: INTERNAL MEDICINE

## 2019-04-24 PROCEDURE — 99213 OFFICE O/P EST LOW 20 MIN: CPT | Performed by: PHYSICIAN ASSISTANT

## 2019-04-24 PROCEDURE — G0463 HOSPITAL OUTPT CLINIC VISIT: HCPCS

## 2019-04-24 NOTE — PROGRESS NOTES
Subjective   Ronnie Baron is a 58 y.o. male.     History of Present Illness   Ronnie Baron 58 y.o. male who presents today for routine follow up check and medication refills.  he has a history of   Patient Active Problem List   Diagnosis   • Anxiety   • OCD (obsessive compulsive disorder)   • Severe recurrent major depression with psychotic features (CMS/[facility])   • CKD (chronic kidney disease) stage 3, GFR 30-59 ml/min (CMS/[facility])   • Hyperglycemia   • Hyperlipidemia   • Vitamin D deficiency   • History of deep vein thrombosis (DVT) of lower extremity   • Erythrocytosis   • Hypothyroidism, acquired   • Obstructive sleep apnea   .  Since the last visit, he has overall felt well.  He has Impaired fasting glucose and will continue close lab follow up to watch for DMII, Hyperlipidemia and is well controlled on medication and Hypothyroidism and is well controlled on Rx.  Labs are in desired treatment range.  he has been compliant with current medications have reviewed them.  The patient denies medication side effects.  Labs for me yearly    Stay away from NSAIDs  Results for orders placed or performed in visit on 01/16/19   Comprehensive metabolic panel   Result Value Ref Range    Glucose 96 65 - 99 mg/dL    BUN 23 (H) 6 - 20 mg/dL    Creatinine 1.59 (H) 0.76 - 1.27 mg/dL    eGFR Non African Am 45 (L) >60 mL/min/1.73    eGFR African Am 55 (L) >60 mL/min/1.73    BUN/Creatinine Ratio 14.5 7.0 - 25.0    Sodium 141 136 - 145 mmol/L    Potassium 4.9 3.5 - 5.2 mmol/L    Chloride 102 98 - 107 mmol/L    Total CO2 26.3 22.0 - 29.0 mmol/L    Calcium 9.7 8.6 - 10.5 mg/dL    Total Protein 7.0 6.0 - 8.5 g/dL    Albumin 4.10 3.50 - 5.20 g/dL    Globulin 2.9 gm/dL    A/G Ratio 1.4 g/dL    Total Bilirubin <0.2 0.1 - 1.2 mg/dL    Alkaline Phosphatase 96 39 - 117 U/L    AST (SGOT) 21 1 - 40 U/L    ALT (SGPT) 37 1 - 41 U/L   Lipid panel   Result Value Ref Range    Total Cholesterol 296 (H) 0 - 200 mg/dL    Triglycerides 223 (H) 0 - 150 mg/dL     HDL Cholesterol 51 40 - 60 mg/dL    VLDL Cholesterol 44.6 (H) 5 - 40 mg/dL    LDL Cholesterol  200 (H) 0 - 100 mg/dL   TSH   Result Value Ref Range    TSH 5.650 (H) 0.270 - 4.200 mIU/mL   Hemoglobin A1c   Result Value Ref Range    Hemoglobin A1C 5.70 (H) 4.80 - 5.60 %   T3, Free   Result Value Ref Range    T3, Free 3.2 2.0 - 4.4 pg/mL   T4, Free   Result Value Ref Range    Free T4 1.14 0.93 - 1.70 ng/dL   Vitamin B12   Result Value Ref Range    Vitamin B-12 757 211 - 946 pg/mL   Folate   Result Value Ref Range    Folate >20.00 4.78 - 24.20 ng/mL   Vitamin D 25 Hydroxy   Result Value Ref Range    25 Hydroxy, Vitamin D 59.8 30.0 - 100.0 ng/ml   PSA Screen   Result Value Ref Range    PSA 2.010 0.000 - 4.000 ng/mL   CBC and Differential   Result Value Ref Range    WBC 6.07 4.50 - 10.70 10*3/mm3    RBC 5.13 4.60 - 6.00 10*6/mm3    Hemoglobin 16.2 13.7 - 17.6 g/dL    Hematocrit 49.4 40.4 - 52.2 %    MCV 96.3 (H) 79.8 - 96.2 fL    MCH 31.6 27.0 - 32.7 pg    MCHC 32.8 32.6 - 36.4 g/dL    RDW 13.8 11.5 - 14.5 %    Platelets 209 140 - 500 10*3/mm3    Neutrophil Rel % 42.9 42.7 - 76.0 %    Lymphocyte Rel % 45.1 19.6 - 45.3 %    Monocyte Rel % 7.7 5.0 - 12.0 %    Eosinophil Rel % 3.8 0.3 - 6.2 %    Basophil Rel % 0.5 0.0 - 1.5 %    Neutrophils Absolute 2.60 1.90 - 8.10 10*3/mm3    Lymphocytes Absolute 2.74 0.90 - 4.80 10*3/mm3    Monocytes Absolute 0.47 0.20 - 1.20 10*3/mm3    Eosinophils Absolute 0.23 0.00 - 0.70 10*3/mm3    Basophils Absolute 0.03 0.00 - 0.20 10*3/mm3    Immature Granulocyte Rel % 0.7 (H) 0.0 - 0.5 %    Immature Grans Absolute 0.04 (H) 0.00 - 0.03 10*3/mm3       Sees cardio next Jan  Just had f/u on sleep apnea; on Cpap now and feels better  I recently started thyroid Rx  Sees DR. Leger    I did vasc and saw DR Hidalgo per Dr Castanon note August; and negative ABIs  I do have him seeing Nephrologist d/t creat elevation; having renal u/s and no change to meds    Had labs at [facility]; 4-18-19 and note ALT  30, LDL 67, creat 1.58, TSH 1.9 (this was 5.6), free T4 1.2   The following portions of the patient's history were reviewed and updated as appropriate: allergies, current medications, past family history, past medical history, past social history, past surgical history and problem list.    Review of Systems   Constitutional: Negative for activity change, appetite change and unexpected weight change.   HENT: Negative for nosebleeds and trouble swallowing.    Eyes: Negative for pain and visual disturbance.   Respiratory: Negative for chest tightness, shortness of breath and wheezing.    Cardiovascular: Negative for chest pain and palpitations.   Gastrointestinal: Negative for abdominal pain and blood in stool.   Endocrine: Negative.    Genitourinary: Negative for difficulty urinating and hematuria.   Musculoskeletal: Negative for joint swelling.   Skin: Negative for color change and rash.   Allergic/Immunologic: Negative.    Neurological: Negative for syncope and speech difficulty.   Hematological: Negative for adenopathy.   Psychiatric/Behavioral: Negative for agitation and confusion.   All other systems reviewed and are negative.      Objective   Physical Exam   Constitutional: He is oriented to person, place, and time. He appears well-developed and well-nourished. No distress.   HENT:   Head: Normocephalic and atraumatic.   Eyes: Conjunctivae and EOM are normal. Pupils are equal, round, and reactive to light. Right eye exhibits no discharge. Left eye exhibits no discharge. No scleral icterus.   Neck: Normal range of motion. Neck supple. No tracheal deviation present. No thyromegaly present.   Cardiovascular: Normal rate, regular rhythm, normal heart sounds, intact distal pulses and normal pulses. Exam reveals no gallop.   No murmur heard.  Pulmonary/Chest: Effort normal and breath sounds normal. No respiratory distress. He has no wheezes. He has no rales.   Musculoskeletal: Normal range of motion.   Neurological:  He is alert and oriented to person, place, and time. He exhibits normal muscle tone. Coordination normal.   Skin: Skin is warm. No rash noted. No erythema. No pallor.   Psychiatric: He has a normal mood and affect. His behavior is normal. Judgment and thought content normal.   Nursing note and vitals reviewed.      Assessment/Plan   Ronnie was seen today for hypothyroidism.    Diagnoses and all orders for this visit:    Mixed hyperlipidemia    Hypothyroidism, acquired    CKD (chronic kidney disease) stage 3, GFR 30-59 ml/min (CMS/[facility])    History of deep vein thrombosis (DVT) of lower extremity    Vitamin D deficiency    Hyperglycemia    Obstructive sleep apnea        In summary, Ronnie Baron, was seen today.  he was seen for  Impaired fasting glucose and will continue close lab follow up to watch for DMII, Hyperlipidemia and is well controlled on medication, Hypothyroidism and is well controlled on Rx.  Labs are in desired treatment range and Vitamin D deficiency and well controlled on medication and labs at goal >30,    Stay off NSAID  Labs 6 mos

## 2019-04-24 NOTE — PATIENT INSTRUCTIONS
Low glycemic index diet  Exercise 30 minutes most days of the week  Make sure you get results on any labs or tests we ordered today  We discussed medications and how to take them as prescribed  Sleep 6-8 hours each night if possible  If you have not signed up for [employer], please activate your code ASAP from your After Visit Summary today    LDL goal <100  LDL goal if heart disease <70  HDL goal >60  Triglyceride goal <150  BP goal =<130/80  Fasting glucose <100

## 2019-05-03 PROBLEM — E66.812 CLASS 2 SEVERE OBESITY DUE TO EXCESS CALORIES WITH SERIOUS COMORBIDITY AND BODY MASS INDEX (BMI) OF 35.0 TO 35.9 IN ADULT: Status: ACTIVE | Noted: 2019-05-03

## 2019-05-03 PROBLEM — G47.14 HYPERSOMNIA DUE TO MEDICAL CONDITION: Status: ACTIVE | Noted: 2019-05-03

## 2019-05-03 PROBLEM — E66.01 CLASS 2 SEVERE OBESITY DUE TO EXCESS CALORIES WITH SERIOUS COMORBIDITY AND BODY MASS INDEX (BMI) OF 35.0 TO 35.9 IN ADULT: Status: ACTIVE | Noted: 2019-05-03

## 2019-05-03 PROBLEM — IMO0002 SLEEP-RELATED HYPOVENTILATION: Status: ACTIVE | Noted: 2019-05-03

## 2019-05-07 ENCOUNTER — TELEPHONE (OUTPATIENT)
Dept: SLEEP MEDICINE | Facility: HOSPITAL | Age: 59
End: 2019-05-07

## 2019-05-21 ENCOUNTER — HOSPITAL ENCOUNTER (OUTPATIENT)
Dept: ULTRASOUND IMAGING | Facility: HOSPITAL | Age: 59
End: 2019-05-21

## 2019-10-21 ENCOUNTER — HOSPITAL ENCOUNTER (OUTPATIENT)
Dept: OTHER | Facility: HOSPITAL | Age: 59
Discharge: HOME | End: 2019-10-21

## 2019-10-21 LAB
25(OH)D3 SERPL-MCNC: 46.2 NG/ML (ref 30–100)
ALBUMIN SERPL-MCNC: 4.2 G/DL (ref 3.5–5)
ALBUMIN/GLOB SERPL: 1.4 {RATIO} (ref 1.4–2.6)
ALP SERPL-CCNC: 115 U/L (ref 56–119)
ALT SERPL-CCNC: 47 U/L (ref 10–40)
ANION GAP SERPL CALC-SCNC: 17 MMOL/L (ref 8–19)
AST SERPL-CCNC: 30 U/L (ref 15–50)
BASOPHILS # BLD AUTO: 0.05 10*3/UL (ref 0–0.2)
BASOPHILS NFR BLD AUTO: 0.9 % (ref 0–3)
BILIRUB SERPL-MCNC: 0.34 MG/DL (ref 0.2–1.3)
BUN SERPL-MCNC: 22 MG/DL (ref 5–25)
BUN/CREAT SERPL: 13 {RATIO} (ref 6–20)
CALCIUM SERPL-MCNC: 9.5 MG/DL (ref 8.7–10.4)
CHLORIDE SERPL-SCNC: 105 MMOL/L (ref 99–111)
CHOLEST SERPL-MCNC: 164 MG/DL (ref 107–200)
CHOLEST/HDLC SERPL: 3.5 {RATIO} (ref 3–6)
CONV ABS IMM GRAN: 0.03 10*3/UL (ref 0–0.2)
CONV CO2: 24 MMOL/L (ref 22–32)
CONV IMMATURE GRAN: 0.5 % (ref 0–1.8)
CONV TOTAL PROTEIN: 7.3 G/DL (ref 6.3–8.2)
CREAT UR-MCNC: 1.75 MG/DL (ref 0.7–1.2)
DEPRECATED RDW RBC AUTO: 42.7 FL (ref 35.1–43.9)
EOSINOPHIL # BLD AUTO: 0.1 10*3/UL (ref 0–0.7)
EOSINOPHIL # BLD AUTO: 1.8 % (ref 0–7)
ERYTHROCYTE [DISTWIDTH] IN BLOOD BY AUTOMATED COUNT: 12.6 % (ref 11.6–14.4)
EST. AVERAGE GLUCOSE BLD GHB EST-MCNC: 120 MG/DL
FOLATE SERPL-MCNC: >20 NG/ML (ref 4.8–20)
GFR SERPLBLD BASED ON 1.73 SQ M-ARVRAT: 42 ML/MIN/{1.73_M2}
GLOBULIN UR ELPH-MCNC: 3.1 G/DL (ref 2–3.5)
GLUCOSE SERPL-MCNC: 86 MG/DL (ref 70–99)
HBA1C MFR BLD: 5.8 % (ref 3.5–5.7)
HCT VFR BLD AUTO: 48.1 % (ref 42–52)
HDLC SERPL-MCNC: 47 MG/DL (ref 40–60)
HGB BLD-MCNC: 15.8 G/DL (ref 14–18)
LDLC SERPL CALC-MCNC: 70 MG/DL (ref 70–100)
LYMPHOCYTES # BLD AUTO: 1.6 10*3/UL (ref 1–5)
LYMPHOCYTES NFR BLD AUTO: 28.9 % (ref 20–45)
MCH RBC QN AUTO: 30.5 PG (ref 27–31)
MCHC RBC AUTO-ENTMCNC: 32.8 G/DL (ref 33–37)
MCV RBC AUTO: 92.9 FL (ref 80–96)
MONOCYTES # BLD AUTO: 0.42 10*3/UL (ref 0.2–1.2)
MONOCYTES NFR BLD AUTO: 7.6 % (ref 3–10)
NEUTROPHILS # BLD AUTO: 3.33 10*3/UL (ref 2–8)
NEUTROPHILS NFR BLD AUTO: 60.3 % (ref 30–85)
NRBC CBCN: 0 % (ref 0–0.7)
OSMOLALITY SERPL CALC.SUM OF ELEC: 295 MOSM/KG (ref 273–304)
PLATELET # BLD AUTO: 208 10*3/UL (ref 130–400)
PMV BLD AUTO: 11 FL (ref 9.4–12.4)
POTASSIUM SERPL-SCNC: 4.5 MMOL/L (ref 3.5–5.3)
RBC # BLD AUTO: 5.18 10*6/UL (ref 4.7–6.1)
SODIUM SERPL-SCNC: 141 MMOL/L (ref 135–147)
T4 FREE SERPL-MCNC: 1.1 NG/DL (ref 0.9–1.8)
TRIGL SERPL-MCNC: 234 MG/DL (ref 40–150)
TSH SERPL-ACNC: 3.05 M[IU]/L (ref 0.27–4.2)
VIT B12 SERPL-MCNC: 569 PG/ML (ref 211–911)
VLDLC SERPL-MCNC: 47 MG/DL (ref 5–37)
WBC # BLD AUTO: 5.53 10*3/UL (ref 4.8–10.8)

## 2019-10-22 LAB — T3FREE SERPL-MCNC: 3.2 PG/ML (ref 2–4.4)

## 2019-10-23 ENCOUNTER — OFFICE VISIT (OUTPATIENT)
Dept: FAMILY MEDICINE CLINIC | Facility: CLINIC | Age: 59
End: 2019-10-23

## 2019-10-23 VITALS
OXYGEN SATURATION: 98 % | SYSTOLIC BLOOD PRESSURE: 110 MMHG | HEIGHT: 70 IN | RESPIRATION RATE: 16 BRPM | DIASTOLIC BLOOD PRESSURE: 80 MMHG | TEMPERATURE: 98.4 F | WEIGHT: 251 LBS | BODY MASS INDEX: 35.93 KG/M2 | HEART RATE: 98 BPM

## 2019-10-23 DIAGNOSIS — F41.9 ANXIETY: ICD-10-CM

## 2019-10-23 DIAGNOSIS — R79.89 LFT ELEVATION: ICD-10-CM

## 2019-10-23 DIAGNOSIS — R73.9 HYPERGLYCEMIA: ICD-10-CM

## 2019-10-23 DIAGNOSIS — E55.9 VITAMIN D DEFICIENCY: Chronic | ICD-10-CM

## 2019-10-23 DIAGNOSIS — G47.33 OBSTRUCTIVE SLEEP APNEA: ICD-10-CM

## 2019-10-23 DIAGNOSIS — N18.30 CKD (CHRONIC KIDNEY DISEASE) STAGE 3, GFR 30-59 ML/MIN (HCC): Chronic | ICD-10-CM

## 2019-10-23 DIAGNOSIS — E03.9 HYPOTHYROIDISM, ACQUIRED: Primary | ICD-10-CM

## 2019-10-23 DIAGNOSIS — R73.01 IMPAIRED FASTING GLUCOSE: ICD-10-CM

## 2019-10-23 DIAGNOSIS — E78.2 MIXED HYPERLIPIDEMIA: Chronic | ICD-10-CM

## 2019-10-23 PROCEDURE — 99214 OFFICE O/P EST MOD 30 MIN: CPT | Performed by: PHYSICIAN ASSISTANT

## 2019-10-23 PROCEDURE — 90750 HZV VACC RECOMBINANT IM: CPT | Performed by: PHYSICIAN ASSISTANT

## 2019-10-23 PROCEDURE — 90471 IMMUNIZATION ADMIN: CPT | Performed by: PHYSICIAN ASSISTANT

## 2019-10-23 RX ORDER — ROSUVASTATIN CALCIUM 10 MG/1
TABLET, COATED ORAL
Qty: 90 TABLET | Refills: 3 | Status: SHIPPED | OUTPATIENT
Start: 2019-10-23 | End: 2020-08-03 | Stop reason: SDUPTHER

## 2019-10-23 NOTE — PROGRESS NOTES
"Subjective   Ronnie Baron is a 59 y.o. male.     History of Present Illness    Since the last visit, he has overall felt anxious.  He has Essential Hypertension and well controlled on current medication, Impaired fasting glucose and will monitor labs to watch for DMII, Hyperlipidemia with goals met with current Rx, Hypothyroidism and must update labs to continue treatment and Vitamin D deficiency and will update labs for continued management.  he has been compliant with current medications have reviewed them.  The patient denies medication side effects.  Will refill medications. /80 (BP Location: Left arm, Patient Position: Sitting, Cuff Size: Large Adult)   Pulse 98   Temp 98.4 °F (36.9 °C) (Oral)   Resp 16   Ht 177.8 cm (70\")   Wt 114 kg (251 lb)   SpO2 98%   BMI 36.01 kg/m²   He is seeing nephrologist Q 6 mos  Need copy of report    Results for orders placed or performed in visit on 01/16/19   Comprehensive metabolic panel   Result Value Ref Range    Glucose 96 65 - 99 mg/dL    BUN 23 (H) 6 - 20 mg/dL    Creatinine 1.59 (H) 0.76 - 1.27 mg/dL    eGFR Non African Am 45 (L) >60 mL/min/1.73    eGFR African Am 55 (L) >60 mL/min/1.73    BUN/Creatinine Ratio 14.5 7.0 - 25.0    Sodium 141 136 - 145 mmol/L    Potassium 4.9 3.5 - 5.2 mmol/L    Chloride 102 98 - 107 mmol/L    Total CO2 26.3 22.0 - 29.0 mmol/L    Calcium 9.7 8.6 - 10.5 mg/dL    Total Protein 7.0 6.0 - 8.5 g/dL    Albumin 4.10 3.50 - 5.20 g/dL    Globulin 2.9 gm/dL    A/G Ratio 1.4 g/dL    Total Bilirubin <0.2 0.1 - 1.2 mg/dL    Alkaline Phosphatase 96 39 - 117 U/L    AST (SGOT) 21 1 - 40 U/L    ALT (SGPT) 37 1 - 41 U/L   Lipid panel   Result Value Ref Range    Total Cholesterol 296 (H) 0 - 200 mg/dL    Triglycerides 223 (H) 0 - 150 mg/dL    HDL Cholesterol 51 40 - 60 mg/dL    VLDL Cholesterol 44.6 (H) 5 - 40 mg/dL    LDL Cholesterol  200 (H) 0 - 100 mg/dL   TSH   Result Value Ref Range    TSH 5.650 (H) 0.270 - 4.200 mIU/mL   Hemoglobin A1c "   Result Value Ref Range    Hemoglobin A1C 5.70 (H) 4.80 - 5.60 %   T3, Free   Result Value Ref Range    T3, Free 3.2 2.0 - 4.4 pg/mL   T4, Free   Result Value Ref Range    Free T4 1.14 0.93 - 1.70 ng/dL   Vitamin B12   Result Value Ref Range    Vitamin B-12 757 211 - 946 pg/mL   Folate   Result Value Ref Range    Folate >20.00 4.78 - 24.20 ng/mL   Vitamin D 25 Hydroxy   Result Value Ref Range    25 Hydroxy, Vitamin D 59.8 30.0 - 100.0 ng/ml   PSA Screen   Result Value Ref Range    PSA 2.010 0.000 - 4.000 ng/mL   CBC and Differential   Result Value Ref Range    WBC 6.07 4.50 - 10.70 10*3/mm3    RBC 5.13 4.60 - 6.00 10*6/mm3    Hemoglobin 16.2 13.7 - 17.6 g/dL    Hematocrit 49.4 40.4 - 52.2 %    MCV 96.3 (H) 79.8 - 96.2 fL    MCH 31.6 27.0 - 32.7 pg    MCHC 32.8 32.6 - 36.4 g/dL    RDW 13.8 11.5 - 14.5 %    Platelets 209 140 - 500 10*3/mm3    Neutrophil Rel % 42.9 42.7 - 76.0 %    Lymphocyte Rel % 45.1 19.6 - 45.3 %    Monocyte Rel % 7.7 5.0 - 12.0 %    Eosinophil Rel % 3.8 0.3 - 6.2 %    Basophil Rel % 0.5 0.0 - 1.5 %    Neutrophils Absolute 2.60 1.90 - 8.10 10*3/mm3    Lymphocytes Absolute 2.74 0.90 - 4.80 10*3/mm3    Monocytes Absolute 0.47 0.20 - 1.20 10*3/mm3    Eosinophils Absolute 0.23 0.00 - 0.70 10*3/mm3    Basophils Absolute 0.03 0.00 - 0.20 10*3/mm3    Immature Granulocyte Rel % 0.7 (H) 0.0 - 0.5 %    Immature Grans Absolute 0.04 (H) 0.00 - 0.03 10*3/mm3     Tolerating Crestor  Use Cpap/Bipap every night  He is seeing nephrologist  Sees DR. Leger    Had labs and not here yet; getting copy      Labs came over appt  Done 10-21-19  Normal CBC  Creat higher at 1.75  GFR 42  ALT 47----high  Trigs 234  LDL at goal 70  TSH at goal 3.050  Free T4 at goal 1.1  A1C 5.8 and pre diabetes  B12 and folate fine  D fine at 46    The following portions of the patient's history were reviewed and updated as appropriate: allergies, current medications, past family history, past medical history, past social history, past  surgical history and problem list.    Review of Systems   Constitutional: Negative for activity change, appetite change and unexpected weight change.   HENT: Negative for nosebleeds and trouble swallowing.    Eyes: Negative for pain and visual disturbance.   Respiratory: Negative for chest tightness, shortness of breath and wheezing.    Cardiovascular: Negative for chest pain and palpitations.   Gastrointestinal: Negative for abdominal pain and blood in stool.   Endocrine: Negative.    Genitourinary: Negative for difficulty urinating and hematuria.   Musculoskeletal: Negative for joint swelling.   Skin: Negative for color change and rash.   Allergic/Immunologic: Negative.    Neurological: Negative for syncope and speech difficulty.   Hematological: Negative for adenopathy.   Psychiatric/Behavioral: Negative for agitation and confusion.   All other systems reviewed and are negative.      Objective   Physical Exam   Constitutional: He is oriented to person, place, and time. He appears well-developed and well-nourished. No distress.   HENT:   Head: Normocephalic and atraumatic.   Eyes: Conjunctivae and EOM are normal. Pupils are equal, round, and reactive to light. Right eye exhibits no discharge. Left eye exhibits no discharge. No scleral icterus.   Neck: Normal range of motion. Neck supple. No tracheal deviation present. No thyromegaly present.   Cardiovascular: Normal rate, regular rhythm, normal heart sounds, intact distal pulses and normal pulses. Exam reveals no gallop.   No murmur heard.  Pulmonary/Chest: Effort normal and breath sounds normal. No respiratory distress. He has no wheezes. He has no rales.   Musculoskeletal: Normal range of motion.   Neurological: He is alert and oriented to person, place, and time. He exhibits normal muscle tone. Coordination normal.   Skin: Skin is warm. No rash noted. No erythema. No pallor.   Psychiatric: He has a normal mood and affect. His behavior is normal. Judgment and  thought content normal.   Nursing note and vitals reviewed.      Assessment/Plan   Ronnie was seen today for hypothyroidism.    Diagnoses and all orders for this visit:    Hypothyroidism, acquired    Obstructive sleep apnea treated with auto CPAP    Anxiety    Hyperglycemia    Mixed hyperlipidemia    Vitamin D deficiency    CKD (chronic kidney disease) stage 3, GFR 30-59 ml/min (CMS/[facility])    Other orders  -     rosuvastatin (CRESTOR) 10 MG tablet; one PO daily For cholesterol  -     Shingrix Vaccine      Plan, Ronnie Baron, was seen today.  he was seen for Imparied fasting glucose and plan follow up labs, diet, and exercise, Hyperlipidemia and will continue current medication, Hypothyroidism well controlled, continue medication, Vitamin D deficiency and will update labs  and reviewed medications and checked for renal function adjusted dosing due to patient having renal impairment.

## 2019-10-23 NOTE — PATIENT INSTRUCTIONS

## 2019-11-25 ENCOUNTER — HOSPITAL ENCOUNTER (OUTPATIENT)
Dept: OTHER | Facility: HOSPITAL | Age: 59
Discharge: HOME | End: 2019-11-25
Attending: INTERNAL MEDICINE

## 2019-11-25 LAB
ANION GAP SERPL CALC-SCNC: 20 MMOL/L (ref 8–19)
APPEARANCE UR: CLEAR
BASOPHILS # BLD AUTO: 0.05 10*3/UL (ref 0–0.2)
BASOPHILS NFR BLD AUTO: 1 % (ref 0–3)
BILIRUB UR QL: NEGATIVE
BUN SERPL-MCNC: 20 MG/DL (ref 5–25)
BUN/CREAT SERPL: 12 {RATIO} (ref 6–20)
CALCIUM SERPL-MCNC: 9.5 MG/DL (ref 8.7–10.4)
CHLORIDE 24H UR-SCNC: 48 MMOL/L
CHLORIDE 24H UR-SRATE: 134 (ref 110–250)
CHLORIDE SERPL-SCNC: 103 MMOL/L (ref 99–111)
COLOR UR: YELLOW
CONV ABS IMM GRAN: 0.05 10*3/UL (ref 0–0.2)
CONV CO2: 22 MMOL/L (ref 22–32)
CONV COLLECTION SOURCE (UA): NORMAL
CONV IMMATURE GRAN: 1 % (ref 0–1.8)
CONV UROBILINOGEN IN URINE BY AUTOMATED TEST STRIP: 0.2 {EHRLICHU}/DL (ref 0.1–1)
CREAT 24H UR-MCNC: 61.4 MG/DL
CREAT CL 24H UR+SERPL-VRATE: 55 ML/MIN (ref 61–166)
CREAT UR-MCNC: 1.62 MG/DL (ref 0.7–1.2)
CREAT UR-MCNC: 1.62 MG/DL (ref 0.7–1.2)
DEPRECATED RDW RBC AUTO: 43.8 FL (ref 35.1–43.9)
EOSINOPHIL # BLD AUTO: 0.13 10*3/UL (ref 0–0.7)
EOSINOPHIL # BLD AUTO: 2.5 % (ref 0–7)
ERYTHROCYTE [DISTWIDTH] IN BLOOD BY AUTOMATED COUNT: 13 % (ref 11.6–14.4)
GFR SERPLBLD BASED ON 1.73 SQ M-ARVRAT: 46 ML/MIN/{1.73_M2}
GLUCOSE SERPL-MCNC: 105 MG/DL (ref 70–99)
GLUCOSE UR QL: NEGATIVE MG/DL
HCT VFR BLD AUTO: 47.7 % (ref 42–52)
HGB BLD-MCNC: 16.1 G/DL (ref 14–18)
HGB UR QL STRIP: NEGATIVE
KETONES UR QL STRIP: NEGATIVE MG/DL
LEUKOCYTE ESTERASE UR QL STRIP: NEGATIVE
LYMPHOCYTES # BLD AUTO: 1.83 10*3/UL (ref 1–5)
LYMPHOCYTES NFR BLD AUTO: 35.5 % (ref 20–45)
MCH RBC QN AUTO: 30.9 PG (ref 27–31)
MCHC RBC AUTO-ENTMCNC: 33.8 G/DL (ref 33–37)
MCV RBC AUTO: 91.6 FL (ref 80–96)
MONOCYTES # BLD AUTO: 0.42 10*3/UL (ref 0.2–1.2)
MONOCYTES NFR BLD AUTO: 8.1 % (ref 3–10)
NEUTROPHILS # BLD AUTO: 2.68 10*3/UL (ref 2–8)
NEUTROPHILS NFR BLD AUTO: 51.9 % (ref 30–85)
NITRITE UR QL STRIP: NEGATIVE
NRBC CBCN: 0 % (ref 0–0.7)
OSMOLALITY SERPL CALC.SUM OF ELEC: 293 MOSM/KG (ref 273–304)
PH UR STRIP.AUTO: 5.5 [PH] (ref 5–8)
PLATELET # BLD AUTO: 190 10*3/UL (ref 130–400)
PMV BLD AUTO: 11.1 FL (ref 9.4–12.4)
POTASSIUM SERPL-SCNC: 4.7 MMOL/L (ref 3.5–5.3)
PROT UR QL: NEGATIVE MG/DL
RBC # BLD AUTO: 5.21 10*6/UL (ref 4.7–6.1)
SODIUM SERPL-SCNC: 140 MMOL/L (ref 135–147)
SP GR UR: 1.02 (ref 1–1.03)
SPECIMEN VOL 24H UR: 2801 ML
SPECIMEN VOL 24H UR: 2801 ML
WBC # BLD AUTO: 5.16 10*3/UL (ref 4.8–10.8)

## 2019-12-31 RX ORDER — LEVOTHYROXINE SODIUM 0.03 MG/1
25 TABLET ORAL DAILY
Qty: 90 TABLET | Refills: 1 | Status: SHIPPED | OUTPATIENT
Start: 2019-12-31 | End: 2020-02-03 | Stop reason: SDUPTHER

## 2020-01-02 DIAGNOSIS — F41.9 ANXIETY: ICD-10-CM

## 2020-01-02 DIAGNOSIS — E03.9 HYPOTHYROIDISM, ACQUIRED: ICD-10-CM

## 2020-01-02 DIAGNOSIS — R73.01 IMPAIRED FASTING GLUCOSE: ICD-10-CM

## 2020-01-02 DIAGNOSIS — E55.9 VITAMIN D DEFICIENCY: Chronic | ICD-10-CM

## 2020-01-02 DIAGNOSIS — R73.9 HYPERGLYCEMIA: ICD-10-CM

## 2020-01-02 DIAGNOSIS — G47.33 OBSTRUCTIVE SLEEP APNEA: ICD-10-CM

## 2020-01-02 DIAGNOSIS — N18.30 CKD (CHRONIC KIDNEY DISEASE) STAGE 3, GFR 30-59 ML/MIN (HCC): Chronic | ICD-10-CM

## 2020-01-02 DIAGNOSIS — N18.9 CHRONIC KIDNEY DISEASE, UNSPECIFIED CKD STAGE: ICD-10-CM

## 2020-01-02 DIAGNOSIS — R79.89 LFT ELEVATION: ICD-10-CM

## 2020-01-02 DIAGNOSIS — E78.2 MIXED HYPERLIPIDEMIA: Chronic | ICD-10-CM

## 2020-01-09 ENCOUNTER — OFFICE VISIT (OUTPATIENT)
Dept: SLEEP MEDICINE | Facility: HOSPITAL | Age: 60
End: 2020-01-09

## 2020-01-09 VITALS — HEIGHT: 70 IN | WEIGHT: 267 LBS | BODY MASS INDEX: 38.22 KG/M2

## 2020-01-09 DIAGNOSIS — IMO0002 SLEEP-RELATED HYPOVENTILATION: ICD-10-CM

## 2020-01-09 DIAGNOSIS — G47.33 OBSTRUCTIVE SLEEP APNEA: Primary | ICD-10-CM

## 2020-01-09 DIAGNOSIS — E66.01 CLASS 2 SEVERE OBESITY DUE TO EXCESS CALORIES WITH SERIOUS COMORBIDITY AND BODY MASS INDEX (BMI) OF 38.0 TO 38.9 IN ADULT (HCC): ICD-10-CM

## 2020-01-09 PROCEDURE — G0463 HOSPITAL OUTPT CLINIC VISIT: HCPCS

## 2020-01-09 PROCEDURE — 99213 OFFICE O/P EST LOW 20 MIN: CPT | Performed by: INTERNAL MEDICINE

## 2020-01-09 NOTE — PROGRESS NOTES
"Follow Up [facility] Note     Chief Complaint:  TALON     Primary Care Physician: Ness Martinez PA-C    Interval History:   I last saw the patient in 2019.  CPAP pressures adjusted.  The patient states he is doing well without new complaints.  He goes to bed at midnight and awakens at 10 AM.  [place] Sleepiness Scale is normal at 1    Review of Systems:    A complete review of systems was done and all were negative with the exception of anxiety    Social History:    Social History     Socioeconomic History   • Marital status:      Spouse name: Arielle   • Number of children: Not on file   • Years of education: High school   • Highest education level: Not on file   Occupational History   • Occupation:      Comment: [employer]   Tobacco Use   • Smoking status: Former Smoker     Packs/day: 1.00     Types: Cigarettes     Last attempt to quit:      Years since quittin.0   • Smokeless tobacco: Former User     Quit date: 1990   Substance and Sexual Activity   • Alcohol use: Yes     Comment: occasional   • Drug use: No   • Sexual activity: Not Currently     Partners: Female     Birth control/protection: Other       Allergies:  Patient has no known allergies.     Medication Review: His list was reviewed.      Vital Signs:    Vitals:    20 0900   Weight: 121 kg (267 lb)   Height: 177.8 cm (70\")     Body mass index is 38.31 kg/m².    Physical Exam:    Constitutional:  Well developed 59 y.o. male that appears in no apparent distress.  Awake & oriented times 3.  Normal mood with normal recent and remote memory and normal judgement.  Eyes:  Conjunctivae normal.  Oropharynx:  moist mucous membranes without exudate and a decreased posterior pharyngeal opening     Results Review:  DME is [employer] and he uses a fullface mask.  Downloads between 10/9 and 2020 compliance 92%.  Average usage is 9 hours and 39 minutes.  Average AHI is mildly abnormal at 6.1 but all " subsets are normal and he does not have a leak.  Average AutoCPAP pressure is 16.2 and his auto CPAP is 10-17.       Impression:   Moderate obstructive sleep apnea with sleep-related hypoxia adequately treated with auto CPAP with good compliance and usage and no complaints of hypersomnolence.    Plan:  Good sleep hygiene measures should be maintained.  Weight loss would be beneficial in this patient who is obese by BMI.  The patient is benefiting from the treatment being provided.     Patient will continue auto CPAP and his pressures will be changed to 12-20.  The patient has gained weight.    The patient will call for any problems and will follow up in 9 months.      Sathish Telles MD  Sleep Medicine  01/09/20  11:02 AM

## 2020-01-10 LAB
ALBUMIN SERPL-MCNC: 4.5 G/DL (ref 3.5–5.2)
ALBUMIN/GLOB SERPL: 1.6 G/DL
ALP SERPL-CCNC: 117 U/L (ref 39–117)
ALT SERPL-CCNC: 44 U/L (ref 1–41)
AST SERPL-CCNC: 25 U/L (ref 1–40)
BILIRUB SERPL-MCNC: 0.3 MG/DL (ref 0.2–1.2)
BUN SERPL-MCNC: 16 MG/DL (ref 6–20)
BUN/CREAT SERPL: 9.6 (ref 7–25)
CALCIUM SERPL-MCNC: 9.4 MG/DL (ref 8.6–10.5)
CHLORIDE SERPL-SCNC: 99 MMOL/L (ref 98–107)
CO2 SERPL-SCNC: 27.9 MMOL/L (ref 22–29)
CREAT SERPL-MCNC: 1.66 MG/DL (ref 0.76–1.27)
GLOBULIN SER CALC-MCNC: 2.9 GM/DL
GLUCOSE SERPL-MCNC: 98 MG/DL (ref 65–99)
HBA1C MFR BLD: 5.8 % (ref 4.8–5.6)
POTASSIUM SERPL-SCNC: 4.9 MMOL/L (ref 3.5–5.2)
PROT SERPL-MCNC: 7.4 G/DL (ref 6–8.5)
SODIUM SERPL-SCNC: 139 MMOL/L (ref 136–145)

## 2020-02-03 ENCOUNTER — OFFICE VISIT (OUTPATIENT)
Dept: FAMILY MEDICINE CLINIC | Facility: CLINIC | Age: 60
End: 2020-02-03

## 2020-02-03 VITALS
SYSTOLIC BLOOD PRESSURE: 120 MMHG | HEART RATE: 99 BPM | OXYGEN SATURATION: 95 % | WEIGHT: 262 LBS | TEMPERATURE: 97.8 F | BODY MASS INDEX: 37.51 KG/M2 | RESPIRATION RATE: 16 BRPM | DIASTOLIC BLOOD PRESSURE: 80 MMHG | HEIGHT: 70 IN

## 2020-02-03 DIAGNOSIS — N18.30 CKD (CHRONIC KIDNEY DISEASE) STAGE 3, GFR 30-59 ML/MIN (HCC): Chronic | ICD-10-CM

## 2020-02-03 DIAGNOSIS — R79.89 LFT ELEVATION: ICD-10-CM

## 2020-02-03 DIAGNOSIS — G47.33 OBSTRUCTIVE SLEEP APNEA: ICD-10-CM

## 2020-02-03 DIAGNOSIS — E03.9 HYPOTHYROIDISM, ACQUIRED: ICD-10-CM

## 2020-02-03 DIAGNOSIS — E78.2 MIXED HYPERLIPIDEMIA: Chronic | ICD-10-CM

## 2020-02-03 DIAGNOSIS — R73.01 IMPAIRED FASTING GLUCOSE: Primary | ICD-10-CM

## 2020-02-03 PROBLEM — N17.9 ACUTE KIDNEY FAILURE: Status: ACTIVE | Noted: 2019-03-04

## 2020-02-03 PROBLEM — Z79.1 LONG TERM (CURRENT) USE OF NON-STEROIDAL ANTI-INFLAMMATORIES (NSAID): Status: ACTIVE | Noted: 2019-03-04

## 2020-02-03 PROCEDURE — 99214 OFFICE O/P EST MOD 30 MIN: CPT | Performed by: PHYSICIAN ASSISTANT

## 2020-02-03 RX ORDER — LEVOTHYROXINE SODIUM 0.03 MG/1
25 TABLET ORAL DAILY
Qty: 90 TABLET | Refills: 3 | Status: SHIPPED | OUTPATIENT
Start: 2020-02-03 | End: 2020-08-03

## 2020-02-03 NOTE — PATIENT INSTRUCTIONS

## 2020-02-03 NOTE — PROGRESS NOTES
"Subjective   Ronnie Baron is a 59 y.o. male.     History of Present Illness      Since the last visit, he has overall felt fairly well.  He has Impaired fasting glucose and will monitor labs to watch for DMII, Hyperlipidemia with goals met with current Rx, Hypothyroidism well controlled on current medication and will continue Rx and Vitamin D deficiency and labs are at goal >30 ng/mL.  he has been compliant with current medications have reviewed them.  The patient denies medication side effects.  Will refill medications. /80   Pulse 99   Temp 97.8 °F (36.6 °C) (Oral)   Resp 16   Ht 177.8 cm (70\")   Wt 119 kg (262 lb)   SpO2 95%   BMI 37.59 kg/m²   Dr Clark is nephrologist  Results for orders placed or performed in visit on 01/02/20   Hemoglobin A1c   Result Value Ref Range    Hemoglobin A1C 5.8 (H) 4.8 - 5.6 %   Comprehensive Metabolic Panel   Result Value Ref Range    Glucose 98 65 - 99 mg/dL    BUN 16 6 - 20 mg/dL    Creatinine 1.66 (H) 0.76 - 1.27 mg/dL    eGFR Non African Am 43 (L) >60 mL/min/1.73    eGFR African Am 52 (L) >60 mL/min/1.73    BUN/Creatinine Ratio 9.6 7.0 - 25.0    Sodium 139 136 - 145 mmol/L    Potassium 4.9 3.5 - 5.2 mmol/L    Chloride 99 98 - 107 mmol/L    Total CO2 27.9 22.0 - 29.0 mmol/L    Calcium 9.4 8.6 - 10.5 mg/dL    Total Protein 7.4 6.0 - 8.5 g/dL    Albumin 4.50 3.50 - 5.20 g/dL    Globulin 2.9 gm/dL    A/G Ratio 1.6 g/dL    Total Bilirubin 0.3 0.2 - 1.2 mg/dL    Alkaline Phosphatase 117 39 - 117 U/L    AST (SGOT) 25 1 - 40 U/L    ALT (SGPT) 44 (H) 1 - 41 U/L       Home bp 120/80    I did labs in October and his A1c was 5.8 LDL was 70.  This A1C is 5.8 now  Watching his ALT at 44  But I did thyroid labs 10/21/2019.  3.0 and free T4 was 1.1  He was done then it was 46, LDL at goal 70  Oct creat 1.75;   Lab Results   Component Value Date    GLUCOSE 90 04/20/2017    BUN 16 01/09/2020    CREATININE 1.66 (H) 01/09/2020    EGFRIFNONA 43 (L) 01/09/2020    EGFRIFAFRI 52 (L) " 01/09/2020    BCR 9.6 01/09/2020    K 4.9 01/09/2020    CO2 27.9 01/09/2020    CALCIUM 9.4 01/09/2020    PROTENTOTREF 7.4 01/09/2020    ALBUMIN 4.50 01/09/2020    LABIL2 1.6 01/09/2020    AST 25 01/09/2020    ALT 44 (H) 01/09/2020     Lab Results   Component Value Date    HGBA1C 5.8 (H) 01/09/2020     Answers for HPI/ROS submitted by the patient on 1/30/2020   What is the primary reason for your visit?: Other  Please describe your symptoms.: follow up with lab results and refill of meds.  Ronnie has been sleep a lot; not waking up until about noon to 1:30; get up dresses; stays awake for few hours then back to hard deep sleep; can sleep for several hours again., He sometimes seems out of it; cannot think to empty garbage; put up clothes; literally lays around  and sleeps; he will not leave the house for days at a time; will not even think to let out our house dog to go the bathroom; I do not know it is anxiety or OCD; like he made a peanut butter sandwich and got a big hunk of peanut butter on the bar and just let it there; then took a dry towel and cleaned it up; no paper towel but dry towel.   So, I am not sure what is going on, medicine, anxiety, ocd, etc.  not sure if you can ask any other questions to determine anything underlying condition.   I told him that I was going to talk mention this to you.  Told him should go back to therapy.  He sees Dr. Ramos after your visit today.  Have you had these symptoms before?: No  How long have you been having these symptoms?: 1-4 weeks ago    The following portions of the patient's history were reviewed and updated as appropriate: allergies, current medications, past family history, past medical history, past social history, past surgical history and problem list.    Review of Systems   Constitutional: Negative for activity change, appetite change and unexpected weight change.   HENT: Negative for nosebleeds and trouble swallowing.    Eyes: Negative for pain and  visual disturbance.   Respiratory: Negative for chest tightness, shortness of breath and wheezing.    Cardiovascular: Negative for chest pain and palpitations.   Gastrointestinal: Negative for abdominal pain and blood in stool.   Endocrine: Negative.    Genitourinary: Negative for difficulty urinating and hematuria.   Musculoskeletal: Negative for joint swelling.   Skin: Negative for color change and rash.   Allergic/Immunologic: Negative.    Neurological: Negative for syncope and speech difficulty.   Hematological: Negative for adenopathy.   Psychiatric/Behavioral: Positive for dysphoric mood and sleep disturbance. Negative for agitation and confusion. The patient is nervous/anxious.    All other systems reviewed and are negative.      Objective   Physical Exam   Constitutional: He is oriented to person, place, and time. He appears well-developed and well-nourished. No distress.   HENT:   Head: Normocephalic and atraumatic.   Eyes: Pupils are equal, round, and reactive to light. Conjunctivae and EOM are normal. Right eye exhibits no discharge. Left eye exhibits no discharge. No scleral icterus.   Neck: Normal range of motion. Neck supple. No tracheal deviation present. No thyromegaly present.   Cardiovascular: Normal rate, regular rhythm, normal heart sounds, intact distal pulses and normal pulses. Exam reveals no gallop.   No murmur heard.  Pulmonary/Chest: Effort normal and breath sounds normal. No respiratory distress. He has no wheezes. He has no rales.   Musculoskeletal: Normal range of motion.   Neurological: He is alert and oriented to person, place, and time. He exhibits normal muscle tone. Coordination normal.   Skin: Skin is warm. No rash noted. No erythema. No pallor.   Psychiatric: He has a normal mood and affect. His behavior is normal. Judgment and thought content normal.   Nursing note and vitals reviewed.      Assessment/Plan   Problems Addressed this Visit        Cardiovascular and Mediastinum     Hyperlipidemia (Chronic)    Relevant Orders    Comprehensive metabolic panel    Lipid panel    CBC and Differential    TSH    Hemoglobin A1c    T3, Free    T4, Free    Vitamin B12    Folate    Vitamin D 25 Hydroxy       Respiratory    Obstructive sleep apnea treated with auto CPAP    Relevant Orders    Comprehensive metabolic panel    Lipid panel    CBC and Differential    TSH    Hemoglobin A1c    T3, Free    T4, Free    Vitamin B12    Folate    Vitamin D 25 Hydroxy       Endocrine    Hypothyroidism, acquired    Relevant Medications    levothyroxine (SYNTHROID, LEVOTHROID) 25 MCG tablet    Other Relevant Orders    Comprehensive metabolic panel    Lipid panel    CBC and Differential    TSH    Hemoglobin A1c    T3, Free    T4, Free    Vitamin B12    Folate    Vitamin D 25 Hydroxy    Impaired fasting glucose - Primary    Relevant Orders    Comprehensive metabolic panel    Lipid panel    CBC and Differential    TSH    Hemoglobin A1c    T3, Free    T4, Free    Vitamin B12    Folate    Vitamin D 25 Hydroxy       Genitourinary    CKD (chronic kidney disease) stage 3, GFR 30-59 ml/min (CMS/HCC) (Chronic)    Relevant Orders    Comprehensive metabolic panel    Lipid panel    CBC and Differential    TSH    Hemoglobin A1c    T3, Free    T4, Free    Vitamin B12    Folate    Vitamin D 25 Hydroxy       Other    LFT elevation    Relevant Orders    Comprehensive metabolic panel    Lipid panel    CBC and Differential    TSH    Hemoglobin A1c    T3, Free    T4, Free    Vitamin B12    Folate    Vitamin D 25 Hydroxy          Cont Cpap  Labs July  Plan, Ronnie Baron, was seen today.  he was seen for Imparied fasting glucose and plan follow up labs, diet, and exercise, Hyperlipidemia and will continue current medication, Hypothyroidism well controlled, continue medication and Vitamin D deficiency and supplemented.  Watching ALT; lose weight  F/u nephrologist

## 2020-06-08 ENCOUNTER — HOSPITAL ENCOUNTER (OUTPATIENT)
Dept: OTHER | Facility: HOSPITAL | Age: 60
Discharge: HOME | End: 2020-06-08
Attending: INTERNAL MEDICINE

## 2020-06-08 LAB
ANION GAP SERPL CALC-SCNC: 15 MMOL/L (ref 8–19)
APPEARANCE UR: CLEAR
BASOPHILS # BLD AUTO: 0.04 10*3/UL (ref 0–0.2)
BASOPHILS NFR BLD AUTO: 0.8 % (ref 0–3)
BILIRUB UR QL: NEGATIVE
BUN SERPL-MCNC: 22 MG/DL (ref 5–25)
BUN/CREAT SERPL: 13 {RATIO} (ref 6–20)
CALCIUM SERPL-MCNC: 9.2 MG/DL (ref 8.7–10.4)
CHLORIDE SERPL-SCNC: 98 MMOL/L (ref 99–111)
COLOR UR: YELLOW
CONV ABS IMM GRAN: 0.02 10*3/UL (ref 0–0.2)
CONV CO2: 26 MMOL/L (ref 22–32)
CONV COLLECTION SOURCE (UA): NORMAL
CONV IMMATURE GRAN: 0.4 % (ref 0–1.8)
CONV UROBILINOGEN IN URINE BY AUTOMATED TEST STRIP: 0.2 {EHRLICHU}/DL (ref 0.1–1)
CREAT UR-MCNC: 1.73 MG/DL (ref 0.7–1.2)
DEPRECATED RDW RBC AUTO: 45.8 FL (ref 35.1–43.9)
EOSINOPHIL # BLD AUTO: 0.13 10*3/UL (ref 0–0.7)
EOSINOPHIL # BLD AUTO: 2.5 % (ref 0–7)
ERYTHROCYTE [DISTWIDTH] IN BLOOD BY AUTOMATED COUNT: 13.2 % (ref 11.6–14.4)
GFR SERPLBLD BASED ON 1.73 SQ M-ARVRAT: 42 ML/MIN/{1.73_M2}
GLUCOSE SERPL-MCNC: 107 MG/DL (ref 70–99)
GLUCOSE UR QL: NEGATIVE MG/DL
HCT VFR BLD AUTO: 47.2 % (ref 42–52)
HGB BLD-MCNC: 15.4 G/DL (ref 14–18)
HGB UR QL STRIP: NEGATIVE
KETONES UR QL STRIP: NEGATIVE MG/DL
LEUKOCYTE ESTERASE UR QL STRIP: NEGATIVE
LYMPHOCYTES # BLD AUTO: 1.81 10*3/UL (ref 1–5)
LYMPHOCYTES NFR BLD AUTO: 34.8 % (ref 20–45)
MCH RBC QN AUTO: 31 PG (ref 27–31)
MCHC RBC AUTO-ENTMCNC: 32.6 G/DL (ref 33–37)
MCV RBC AUTO: 95.2 FL (ref 80–96)
MONOCYTES # BLD AUTO: 0.42 10*3/UL (ref 0.2–1.2)
MONOCYTES NFR BLD AUTO: 8.1 % (ref 3–10)
NEUTROPHILS # BLD AUTO: 2.78 10*3/UL (ref 2–8)
NEUTROPHILS NFR BLD AUTO: 53.4 % (ref 30–85)
NITRITE UR QL STRIP: NEGATIVE
NRBC CBCN: 0 % (ref 0–0.7)
OSMOLALITY SERPL CALC.SUM OF ELEC: 284 MOSM/KG (ref 273–304)
PH UR STRIP.AUTO: 6.5 [PH] (ref 5–8)
PLATELET # BLD AUTO: 196 10*3/UL (ref 130–400)
PMV BLD AUTO: 11 FL (ref 9.4–12.4)
POTASSIUM SERPL-SCNC: 4.4 MMOL/L (ref 3.5–5.3)
PROT UR QL: NEGATIVE MG/DL
RBC # BLD AUTO: 4.96 10*6/UL (ref 4.7–6.1)
SODIUM SERPL-SCNC: 135 MMOL/L (ref 135–147)
SP GR UR: 1.01 (ref 1–1.03)
WBC # BLD AUTO: 5.2 10*3/UL (ref 4.8–10.8)

## 2020-07-01 ENCOUNTER — RESULTS ENCOUNTER (OUTPATIENT)
Dept: FAMILY MEDICINE CLINIC | Facility: CLINIC | Age: 60
End: 2020-07-01

## 2020-07-01 DIAGNOSIS — E78.2 MIXED HYPERLIPIDEMIA: Chronic | ICD-10-CM

## 2020-07-01 DIAGNOSIS — E03.9 HYPOTHYROIDISM, ACQUIRED: ICD-10-CM

## 2020-07-01 DIAGNOSIS — R79.89 LFT ELEVATION: ICD-10-CM

## 2020-07-01 DIAGNOSIS — G47.33 OBSTRUCTIVE SLEEP APNEA: ICD-10-CM

## 2020-07-01 DIAGNOSIS — N18.30 CKD (CHRONIC KIDNEY DISEASE) STAGE 3, GFR 30-59 ML/MIN (HCC): Chronic | ICD-10-CM

## 2020-07-01 DIAGNOSIS — R73.01 IMPAIRED FASTING GLUCOSE: ICD-10-CM

## 2020-07-25 LAB
25(OH)D3+25(OH)D2 SERPL-MCNC: 41.8 NG/ML (ref 30–100)
ALBUMIN SERPL-MCNC: 4.2 G/DL (ref 3.8–4.9)
ALBUMIN/GLOB SERPL: 1.6 {RATIO} (ref 1.2–2.2)
ALP SERPL-CCNC: 106 IU/L (ref 39–117)
ALT SERPL-CCNC: 36 IU/L (ref 0–44)
AST SERPL-CCNC: 22 IU/L (ref 0–40)
BASOPHILS # BLD AUTO: 0 X10E3/UL (ref 0–0.2)
BASOPHILS NFR BLD AUTO: 1 %
BILIRUB SERPL-MCNC: 0.3 MG/DL (ref 0–1.2)
BUN SERPL-MCNC: 21 MG/DL (ref 6–24)
BUN/CREAT SERPL: 13 (ref 9–20)
CALCIUM SERPL-MCNC: 9.3 MG/DL (ref 8.7–10.2)
CHLORIDE SERPL-SCNC: 103 MMOL/L (ref 96–106)
CHOLEST SERPL-MCNC: 200 MG/DL (ref 100–199)
CO2 SERPL-SCNC: 26 MMOL/L (ref 20–29)
CREAT SERPL-MCNC: 1.65 MG/DL (ref 0.76–1.27)
EOSINOPHIL # BLD AUTO: 0.1 X10E3/UL (ref 0–0.4)
EOSINOPHIL NFR BLD AUTO: 3 %
ERYTHROCYTE [DISTWIDTH] IN BLOOD BY AUTOMATED COUNT: 13 % (ref 11.6–15.4)
FOLATE SERPL-MCNC: >20 NG/ML
GLOBULIN SER CALC-MCNC: 2.6 G/DL (ref 1.5–4.5)
GLUCOSE SERPL-MCNC: 97 MG/DL (ref 65–99)
HBA1C MFR BLD: 5.9 % (ref 4.8–5.6)
HCT VFR BLD AUTO: 47.4 % (ref 37.5–51)
HDLC SERPL-MCNC: 45 MG/DL
HGB BLD-MCNC: 16.4 G/DL (ref 13–17.7)
IMM GRANULOCYTES # BLD AUTO: 0 X10E3/UL (ref 0–0.1)
IMM GRANULOCYTES NFR BLD AUTO: 1 %
LDLC SERPL CALC-MCNC: 97 MG/DL (ref 0–99)
LYMPHOCYTES # BLD AUTO: 2 X10E3/UL (ref 0.7–3.1)
LYMPHOCYTES NFR BLD AUTO: 39 %
MCH RBC QN AUTO: 31.8 PG (ref 26.6–33)
MCHC RBC AUTO-ENTMCNC: 34.6 G/DL (ref 31.5–35.7)
MCV RBC AUTO: 92 FL (ref 79–97)
MONOCYTES # BLD AUTO: 0.4 X10E3/UL (ref 0.1–0.9)
MONOCYTES NFR BLD AUTO: 8 %
NEUTROPHILS # BLD AUTO: 2.5 X10E3/UL (ref 1.4–7)
NEUTROPHILS NFR BLD AUTO: 48 %
PLATELET # BLD AUTO: 197 X10E3/UL (ref 150–450)
POTASSIUM SERPL-SCNC: 4.5 MMOL/L (ref 3.5–5.2)
PROT SERPL-MCNC: 6.8 G/DL (ref 6–8.5)
RBC # BLD AUTO: 5.16 X10E6/UL (ref 4.14–5.8)
SODIUM SERPL-SCNC: 141 MMOL/L (ref 134–144)
T3FREE SERPL-MCNC: 3 PG/ML (ref 2–4.4)
T4 FREE SERPL-MCNC: 1.09 NG/DL (ref 0.82–1.77)
TRIGL SERPL-MCNC: 288 MG/DL (ref 0–149)
TSH SERPL DL<=0.005 MIU/L-ACNC: 6.27 UIU/ML (ref 0.45–4.5)
VIT B12 SERPL-MCNC: 531 PG/ML (ref 232–1245)
VLDLC SERPL CALC-MCNC: 58 MG/DL (ref 5–40)
WBC # BLD AUTO: 5.1 X10E3/UL (ref 3.4–10.8)

## 2020-08-03 ENCOUNTER — OFFICE VISIT (OUTPATIENT)
Dept: FAMILY MEDICINE CLINIC | Facility: CLINIC | Age: 60
End: 2020-08-03

## 2020-08-03 VITALS
SYSTOLIC BLOOD PRESSURE: 118 MMHG | WEIGHT: 271 LBS | RESPIRATION RATE: 16 BRPM | BODY MASS INDEX: 38.8 KG/M2 | OXYGEN SATURATION: 94 % | HEIGHT: 70 IN | TEMPERATURE: 97.9 F | HEART RATE: 101 BPM | DIASTOLIC BLOOD PRESSURE: 64 MMHG

## 2020-08-03 DIAGNOSIS — E03.9 HYPOTHYROIDISM, ACQUIRED: ICD-10-CM

## 2020-08-03 DIAGNOSIS — G47.33 OBSTRUCTIVE SLEEP APNEA: ICD-10-CM

## 2020-08-03 DIAGNOSIS — E78.2 MIXED HYPERLIPIDEMIA: Primary | Chronic | ICD-10-CM

## 2020-08-03 DIAGNOSIS — R73.01 IMPAIRED FASTING GLUCOSE: ICD-10-CM

## 2020-08-03 DIAGNOSIS — Z12.5 SCREENING PSA (PROSTATE SPECIFIC ANTIGEN): ICD-10-CM

## 2020-08-03 DIAGNOSIS — N18.30 CKD (CHRONIC KIDNEY DISEASE) STAGE 3, GFR 30-59 ML/MIN (HCC): Chronic | ICD-10-CM

## 2020-08-03 DIAGNOSIS — E55.9 VITAMIN D DEFICIENCY: Chronic | ICD-10-CM

## 2020-08-03 PROCEDURE — 99214 OFFICE O/P EST MOD 30 MIN: CPT | Performed by: PHYSICIAN ASSISTANT

## 2020-08-03 RX ORDER — LEVOTHYROXINE SODIUM 0.05 MG/1
50 TABLET ORAL DAILY
Qty: 90 TABLET | Refills: 3 | Status: SHIPPED | OUTPATIENT
Start: 2020-08-03 | End: 2021-04-29

## 2020-08-03 RX ORDER — ROSUVASTATIN CALCIUM 10 MG/1
TABLET, COATED ORAL
Qty: 90 TABLET | Refills: 3 | Status: SHIPPED | OUTPATIENT
Start: 2020-08-03 | End: 2021-07-31

## 2020-08-03 NOTE — PATIENT INSTRUCTIONS
Dyslipidemia  Dyslipidemia is an imbalance of waxy, fat-like substances (lipids) in the blood. The body needs lipids in small amounts. Dyslipidemia often involves a high level of cholesterol or triglycerides, which are types of lipids.  Common forms of dyslipidemia include:  · High levels of LDL cholesterol. LDL is the type of cholesterol that causes fatty deposits (plaques) to build up in the blood vessels that carry blood away from your heart (arteries).  · Low levels of HDL cholesterol. HDL cholesterol is the type of cholesterol that protects against heart disease. High levels of HDL remove the LDL buildup from arteries.  · High levels of triglycerides. Triglycerides are a fatty substance in the blood that is linked to a buildup of plaques in the arteries.  What are the causes?  Primary dyslipidemia is caused by changes (mutations) in genes that are passed down through families (inherited). These mutations cause several types of dyslipidemia.  Secondary dyslipidemia is caused by lifestyle choices and diseases that lead to dyslipidemia, such as:  · Eating a diet that is high in animal fat.  · Not getting enough exercise.  · Having diabetes, kidney disease, liver disease, or thyroid disease.  · Drinking large amounts of alcohol.  · Using certain medicines.  What increases the risk?  You are more likely to develop this condition if you are an older man or if you are a woman who has gone through menopause. Other risk factors include:  · Having a family history of dyslipidemia.  · Taking certain medicines, including birth control pills, steroids, some diuretics, and beta-blockers.  · Smoking cigarettes.  · Eating a high-fat diet.  · Having certain medical conditions such as diabetes, polycystic ovary syndrome (PCOS), kidney disease, liver disease, or hypothyroidism.  · Not exercising regularly.  · Being overweight or obese with too much belly fat.  What are the signs or symptoms?  In most cases, dyslipidemia does not  usually cause any symptoms.  In severe cases, very high lipid levels can cause:  · Fatty bumps under the skin (xanthomas).  · White or gray ring around the black center (pupil) of the eye.  Very high triglyceride levels can cause inflammation of the pancreas (pancreatitis).  How is this diagnosed?  Your health care provider may diagnose dyslipidemia based on a routine blood test (fasting blood test). Because most people do not have symptoms of the condition, this blood testing (lipid profile) is done on adults age 20 and older and is repeated every 5 years. This test checks:  · Total cholesterol. This measures the total amount of cholesterol in your blood, including LDL cholesterol, HDL cholesterol, and triglycerides. A healthy number is below 200.  · LDL cholesterol. The target number for LDL cholesterol is different for each person, depending on individual risk factors. Ask your health care provider what your LDL cholesterol should be.  · HDL cholesterol. An HDL level of 60 or higher is best because it helps to protect against heart disease. A number below 40 for men or below 50 for women increases the risk for heart disease.  · Triglycerides. A healthy triglyceride number is below 150.  If your lipid profile is abnormal, your health care provider may do other blood tests.  How is this treated?  Treatment depends on the type of dyslipidemia that you have and your other risk factors for heart disease and stroke. Your health care provider will have a target range for your lipid levels based on this information.  For many people, this condition may be treated by lifestyle changes, such as diet and exercise. Your health care provider may recommend that you:  · Get regular exercise.  · Make changes to your diet.  · Quit smoking if you smoke.  If diet changes and exercise do not help you reach your goals, your health care provider may also prescribe medicine to lower lipids. The most commonly prescribed type of medicine  lowers your LDL cholesterol (statin drug). If you have a high triglyceride level, your provider may prescribe another type of drug (fibrate) or an omega-3 fish oil supplement, or both.  Follow these instructions at home:    Eating and drinking  · Follow instructions from your health care provider or dietitian about eating or drinking restrictions.  · Eat a healthy diet as told by your health care provider. This can help you reach and maintain a healthy weight, lower your LDL cholesterol, and raise your HDL cholesterol. This may include:  ? Limiting your calories, if you are overweight.  ? Eating more fruits, vegetables, whole grains, fish, and lean meats.  ? Limiting saturated fat, trans fat, and cholesterol.  · If you drink alcohol:  ? Limit how much you use.  ? Be aware of how much alcohol is in your drink. In the [place] one drink equals one 12 oz bottle of beer (355 mL), one 5 oz glass of wine (148 mL), or one 1½ oz glass of hard liquor (44 mL).  · Do not drink alcohol if:  ? Your health care provider tells you not to drink.  ? You are pregnant, may be pregnant, or are planning to become pregnant.  Activity  · Get regular exercise. Start an exercise and strength training program as told by your health care provider. Ask your health care provider what activities are safe for you. Your health care provider may recommend:  ? 30 minutes of aerobic activity 4-6 days a week. Brisk walking is an example of aerobic activity.  ? Strength training 2 days a week.  General instructions  · Do not use any products that contain nicotine or tobacco, such as cigarettes, e-cigarettes, and chewing tobacco. If you need help quitting, ask your health care provider.  · Take over-the-counter and prescription medicines only as told by your health care provider. This includes supplements.  · Keep all follow-up visits as told by your health care provider.  Contact a health care provider if:  · You are:  ? Having trouble sticking to your  exercise or diet plan.  ? Struggling to quit smoking or control your use of alcohol.  Summary  · Dyslipidemia often involves a high level of cholesterol or triglycerides, which are types of lipids.  · Treatment depends on the type of dyslipidemia that you have and your other risk factors for heart disease and stroke.  · For many people, treatment starts with lifestyle changes, such as diet and exercise.  · Your health care provider may prescribe medicine to lower lipids.  This information is not intended to replace advice given to you by your health care provider. Make sure you discuss any questions you have with your health care provider.  Document Released: 12/23/2014 Document Revised: 08/12/2019 Document Reviewed: 07/19/2019  [employer] Patient Education © 2020 [employer]

## 2020-08-03 NOTE — PROGRESS NOTES
"Subjective   Ronnie Baron is a 59 y.o. male.     History of Present Illness    Since the last visit, he has overall felt well.  He has Impaired fasting glucose and will monitor labs to watch for DMII, Hyperlipidemia with goals met with current Rx, Hypothyroidism with review of labs today and adjustment made in medication doseage with follow up labs ordered and Vitamin D deficiency and labs are at goal >30 ng/mL.  he has been compliant with current medications have reviewed them.  The patient denies medication side effects.  Will refill medications. /64 (BP Location: Right arm, Patient Position: Sitting, Cuff Size: Large Adult)   Pulse 101   Temp 97.9 °F (36.6 °C) (Skin)   Resp 16   Ht 177.8 cm (70\")   Wt 123 kg (271 lb)   SpO2 94%   BMI 38.88 kg/m²     Results for orders placed or performed in visit on 07/01/20   Comprehensive metabolic panel   Result Value Ref Range    Glucose 97 65 - 99 mg/dL    BUN 21 6 - 24 mg/dL    Creatinine 1.65 (H) 0.76 - 1.27 mg/dL    eGFR Non African Am 45 (L) >59 mL/min/1.73    eGFR African Am 52 (L) >59 mL/min/1.73    BUN/Creatinine Ratio 13 9 - 20    Sodium 141 134 - 144 mmol/L    Potassium 4.5 3.5 - 5.2 mmol/L    Chloride 103 96 - 106 mmol/L    Total CO2 26 20 - 29 mmol/L    Calcium 9.3 8.7 - 10.2 mg/dL    Total Protein 6.8 6.0 - 8.5 g/dL    Albumin 4.2 3.8 - 4.9 g/dL    Globulin 2.6 1.5 - 4.5 g/dL    A/G Ratio 1.6 1.2 - 2.2    Total Bilirubin 0.3 0.0 - 1.2 mg/dL    Alkaline Phosphatase 106 39 - 117 IU/L    AST (SGOT) 22 0 - 40 IU/L    ALT (SGPT) 36 0 - 44 IU/L   Lipid panel   Result Value Ref Range    Total Cholesterol 200 (H) 100 - 199 mg/dL    Triglycerides 288 (H) 0 - 149 mg/dL    HDL Cholesterol 45 >39 mg/dL    VLDL Cholesterol 58 (H) 5 - 40 mg/dL    LDL Cholesterol  97 0 - 99 mg/dL   TSH   Result Value Ref Range    TSH 6.270 (H) 0.450 - 4.500 uIU/mL   Hemoglobin A1c   Result Value Ref Range    Hemoglobin A1C 5.9 (H) 4.8 - 5.6 %   T3, Free   Result Value Ref Range    " T3, Free 3.0 2.0 - 4.4 pg/mL   T4, Free   Result Value Ref Range    Free T4 1.09 0.82 - 1.77 ng/dL   Vitamin B12   Result Value Ref Range    Vitamin B-12 531 232 - 1,245 pg/mL   Folate   Result Value Ref Range    Folate >20.0 >3.0 ng/mL   Vitamin D 25 Hydroxy   Result Value Ref Range    25 Hydroxy, Vitamin D 41.8 30.0 - 100.0 ng/mL   CBC and Differential   Result Value Ref Range    WBC 5.1 3.4 - 10.8 x10E3/uL    RBC 5.16 4.14 - 5.80 x10E6/uL    Hemoglobin 16.4 13.0 - 17.7 g/dL    Hematocrit 47.4 37.5 - 51.0 %    MCV 92 79 - 97 fL    MCH 31.8 26.6 - 33.0 pg    MCHC 34.6 31.5 - 35.7 g/dL    RDW 13.0 11.6 - 15.4 %    Platelets 197 150 - 450 x10E3/uL    Neutrophil Rel % 48 Not Estab. %    Lymphocyte Rel % 39 Not Estab. %    Monocyte Rel % 8 Not Estab. %    Eosinophil Rel % 3 Not Estab. %    Basophil Rel % 1 Not Estab. %    Neutrophils Absolute 2.5 1.4 - 7.0 x10E3/uL    Lymphocytes Absolute 2.0 0.7 - 3.1 x10E3/uL    Monocytes Absolute 0.4 0.1 - 0.9 x10E3/uL    Eosinophils Absolute 0.1 0.0 - 0.4 x10E3/uL    Basophils Absolute 0.0 0.0 - 0.2 x10E3/uL    Immature Granulocyte Rel % 1 Not Estab. %    Immature Grans Absolute 0.0 0.0 - 0.1 x10E3/uL     Pulse stays   Can be 65 at night  Use Cpap/Bipap every night  DR Clark for CKD3  DR Leger for psych    LFT is normal this lab  Watching glucose!!! A1C  CKD    The following portions of the patient's history were reviewed and updated as appropriate: allergies, current medications, past family history, past medical history, past social history, past surgical history and problem list.    Review of Systems   Constitutional: Negative for activity change, appetite change and unexpected weight change.   HENT: Negative for nosebleeds and trouble swallowing.    Eyes: Negative for pain and visual disturbance.   Respiratory: Negative for chest tightness, shortness of breath and wheezing.    Cardiovascular: Negative for chest pain and palpitations.   Gastrointestinal: Negative  for abdominal pain and blood in stool.   Endocrine: Negative.    Genitourinary: Negative for difficulty urinating and hematuria.   Musculoskeletal: Negative for joint swelling.   Skin: Negative for color change and rash.   Allergic/Immunologic: Negative.    Neurological: Negative for syncope and speech difficulty.   Hematological: Negative for adenopathy.   Psychiatric/Behavioral: Negative for agitation, confusion, dysphoric mood and sleep disturbance. The patient is not nervous/anxious.    All other systems reviewed and are negative.      Objective   Physical Exam   Constitutional: He is oriented to person, place, and time. He appears well-developed and well-nourished. No distress.   HENT:   Head: Normocephalic and atraumatic.   Eyes: Pupils are equal, round, and reactive to light. Conjunctivae and EOM are normal. Right eye exhibits no discharge. Left eye exhibits no discharge. No scleral icterus.   Neck: Normal range of motion. Neck supple. No tracheal deviation present. No thyromegaly present.   Cardiovascular: Normal rate, regular rhythm, normal heart sounds, intact distal pulses and normal pulses. Exam reveals no gallop.   No murmur heard.  Pulmonary/Chest: Effort normal and breath sounds normal. No respiratory distress. He has no wheezes. He has no rales.   Musculoskeletal: Normal range of motion.   Neurological: He is alert and oriented to person, place, and time. He exhibits normal muscle tone. Coordination normal.   Skin: Skin is warm. No rash noted. No erythema. No pallor.   Psychiatric: He has a normal mood and affect. His behavior is normal. Judgment and thought content normal.   Nursing note and vitals reviewed.      Assessment/Plan   Ronnie was seen today for hypothyroidism.    Diagnoses and all orders for this visit:    Mixed hyperlipidemia  -     T4, Free; Future  -     T3, Free; Future  -     TSH; Future  -     PSA Screen; Future    Vitamin D deficiency  -     T4, Free; Future  -     T3, Free;  Future  -     TSH; Future  -     PSA Screen; Future    CKD (chronic kidney disease) stage 3, GFR 30-59 ml/min (CMS/[facility])  -     T4, Free; Future  -     T3, Free; Future  -     TSH; Future  -     PSA Screen; Future    Impaired fasting glucose  -     T4, Free; Future  -     T3, Free; Future  -     TSH; Future  -     PSA Screen; Future    Obstructive sleep apnea treated with auto CPAP  -     T4, Free; Future  -     T3, Free; Future  -     TSH; Future  -     PSA Screen; Future    Hypothyroidism, acquired  -     T4, Free; Future  -     T3, Free; Future  -     TSH; Future  -     PSA Screen; Future    Screening PSA (prostate specific antigen)  -     T4, Free; Future  -     T3, Free; Future  -     TSH; Future  -     PSA Screen; Future    Other orders  -     rosuvastatin (Crestor) 10 MG tablet; one PO daily For cholesterol  -     levothyroxine (Synthroid) 50 MCG tablet; Take 1 tablet by mouth Daily. For thyroid before breakfast      Plan, Ronnie Baron, was seen today.  he was seen for Hyperlipidemia and will continue current medication, Hypothyroidism with abnormal labs and new medication regimen and Vitamin D deficiency and supplemented.  Watching LFTs--normal this time  Update PSA screen               Answers for HPI/ROS submitted by the patient on 7/27/2020   What is the primary reason for your visit?: Physical

## 2020-09-09 ENCOUNTER — OFFICE VISIT (OUTPATIENT)
Dept: SLEEP MEDICINE | Facility: HOSPITAL | Age: 60
End: 2020-09-09

## 2020-09-09 VITALS — BODY MASS INDEX: 39.37 KG/M2 | OXYGEN SATURATION: 94 % | WEIGHT: 275 LBS | HEIGHT: 70 IN | HEART RATE: 108 BPM

## 2020-09-09 DIAGNOSIS — E66.01 CLASS 2 SEVERE OBESITY DUE TO EXCESS CALORIES WITH SERIOUS COMORBIDITY AND BODY MASS INDEX (BMI) OF 39.0 TO 39.9 IN ADULT (HCC): ICD-10-CM

## 2020-09-09 DIAGNOSIS — G47.33 OBSTRUCTIVE SLEEP APNEA: Primary | ICD-10-CM

## 2020-09-09 DIAGNOSIS — IMO0002 SLEEP-RELATED HYPOVENTILATION: ICD-10-CM

## 2020-09-09 PROCEDURE — 99213 OFFICE O/P EST LOW 20 MIN: CPT | Performed by: INTERNAL MEDICINE

## 2020-09-09 PROCEDURE — G0463 HOSPITAL OUTPT CLINIC VISIT: HCPCS

## 2020-09-09 NOTE — PROGRESS NOTES
"[facility] Note     Chief Complaint:  TALON     Primary Care Physician: Ness Martinez PA-C    Interval History:   The patient is a 60 y.o. male who I last saw January of this year.  He is well without new complaints.  He goes to bed at 1 AM and awakens at 10 AM.  He will use the bathroom during the nighttime.  He has no complaints of hypersomnolence.    Review of Systems:    A complete review of systems was done and all were negative with the exception of the above    Social History:    Social History     Socioeconomic History   • Marital status:      Spouse name: Arielle   • Number of children: Not on file   • Years of education: High school   • Highest education level: Not on file   Occupational History   • Occupation:      Comment: [employer]   Tobacco Use   • Smoking status: Former Smoker     Packs/day: 1.00     Types: Cigarettes     Last attempt to quit:      Years since quittin.7   • Smokeless tobacco: Former User     Quit date: 1990   Substance and Sexual Activity   • Alcohol use: Yes     Comment: rare occasional just pleasure  none since 2016   • Drug use: No   • Sexual activity: Not Currently     Partners: Female     Birth control/protection: Other       Allergies:  Patient has no known allergies.     Medication Review:  Reviewed.      Vital Signs:    Vitals:    20 0900   Pulse: 108   SpO2: 94%   Weight: 125 kg (275 lb)   Height: 177.8 cm (70\")     Body mass index is 39.46 kg/m².    Physical Exam:    Constitutional:  Well developed 60 y.o. male that appears in no apparent distress.  Awake & oriented times 3.  Normal mood with normal recent and remote memory and normal judgement.  Eyes:  Conjunctivae normal.  Oropharynx: Previously, moist mucous membranes without exudate and a decreased posterior pharyngeal opening, patient is wearing a facemask.     Results Review:  DME is [facility] and he uses a fullface mask.  Downloads between " 6/9 and 9/6/2020 compliance 88%.  Average usage is 7 hours and 23 minutes.  Average AHI is mildly abnormal at 6 but all subsets are normal without leak.  Average AutoCPAP pressure is 17.1 and his auto CPAP is 12-20.       Impression:   Moderate obstructive sleep apnea with sleep-related hypoxia home sleep study 2/26/2019 adequately treated with auto CPAP with good compliance and usage and no complaints of hypersomnolence.    Plan:  Good sleep hygiene measures should be maintained.  Weight loss would be beneficial in this patient who is obese by BMI.  The patient is benefiting from the treatment being provided.     The patient will continue auto CPAP    The patient will call for any problems and will follow up in 1 year.      Sathish Telles MD  Sleep Medicine  09/09/20  10:23

## 2020-09-14 ENCOUNTER — RESULTS ENCOUNTER (OUTPATIENT)
Dept: FAMILY MEDICINE CLINIC | Facility: CLINIC | Age: 60
End: 2020-09-14

## 2020-09-14 DIAGNOSIS — N18.30 CKD (CHRONIC KIDNEY DISEASE) STAGE 3, GFR 30-59 ML/MIN (HCC): Chronic | ICD-10-CM

## 2020-09-14 DIAGNOSIS — R73.01 IMPAIRED FASTING GLUCOSE: ICD-10-CM

## 2020-09-14 DIAGNOSIS — Z12.5 SCREENING PSA (PROSTATE SPECIFIC ANTIGEN): ICD-10-CM

## 2020-09-14 DIAGNOSIS — E55.9 VITAMIN D DEFICIENCY: Chronic | ICD-10-CM

## 2020-09-14 DIAGNOSIS — E03.9 HYPOTHYROIDISM, ACQUIRED: ICD-10-CM

## 2020-09-14 DIAGNOSIS — E78.2 MIXED HYPERLIPIDEMIA: Chronic | ICD-10-CM

## 2020-09-14 DIAGNOSIS — G47.33 OBSTRUCTIVE SLEEP APNEA: ICD-10-CM

## 2020-12-30 ENCOUNTER — CONVERSION ENCOUNTER (OUTPATIENT)
Dept: FAMILY MEDICINE CLINIC | Facility: CLINIC | Age: 60
End: 2020-12-30

## 2020-12-30 ENCOUNTER — HOSPITAL ENCOUNTER (OUTPATIENT)
Dept: FAMILY MEDICINE CLINIC | Facility: CLINIC | Age: 60
Discharge: HOME | End: 2020-12-30
Attending: NURSE PRACTITIONER

## 2020-12-30 ENCOUNTER — OFFICE VISIT CONVERTED (OUTPATIENT)
Dept: FAMILY MEDICINE CLINIC | Facility: CLINIC | Age: 60
End: 2020-12-30
Attending: NURSE PRACTITIONER

## 2021-01-11 ENCOUNTER — HOSPITAL ENCOUNTER (OUTPATIENT)
Dept: OTHER | Facility: HOSPITAL | Age: 61
Discharge: HOME | End: 2021-01-11
Attending: INTERNAL MEDICINE

## 2021-01-11 LAB
ANION GAP SERPL CALC-SCNC: 17 MMOL/L (ref 8–19)
APPEARANCE UR: CLEAR
BASOPHILS # BLD AUTO: 0.07 10*3/UL (ref 0–0.2)
BASOPHILS NFR BLD AUTO: 1.1 % (ref 0–3)
BILIRUB UR QL: NEGATIVE
BUN SERPL-MCNC: 22 MG/DL (ref 5–25)
BUN/CREAT SERPL: 13 {RATIO} (ref 6–20)
CALCIUM SERPL-MCNC: 9.5 MG/DL (ref 8.7–10.4)
CHLORIDE SERPL-SCNC: 103 MMOL/L (ref 99–111)
COLOR UR: YELLOW
CONV ABS IMM GRAN: 0.07 10*3/UL (ref 0–0.2)
CONV CO2: 25 MMOL/L (ref 22–32)
CONV COLLECTION SOURCE (UA): NORMAL
CONV IMMATURE GRAN: 1.1 % (ref 0–1.8)
CONV UROBILINOGEN IN URINE BY AUTOMATED TEST STRIP: 1 {EHRLICHU}/DL (ref 0.1–1)
CREAT UR-MCNC: 1.63 MG/DL (ref 0.7–1.2)
DEPRECATED RDW RBC AUTO: 45.1 FL (ref 35.1–43.9)
EOSINOPHIL # BLD AUTO: 0.15 10*3/UL (ref 0–0.7)
EOSINOPHIL # BLD AUTO: 2.3 % (ref 0–7)
ERYTHROCYTE [DISTWIDTH] IN BLOOD BY AUTOMATED COUNT: 13.2 % (ref 11.6–14.4)
GFR SERPLBLD BASED ON 1.73 SQ M-ARVRAT: 45 ML/MIN/{1.73_M2}
GLUCOSE SERPL-MCNC: 98 MG/DL (ref 70–99)
GLUCOSE UR QL: NEGATIVE MG/DL
HCT VFR BLD AUTO: 48.5 % (ref 42–52)
HGB BLD-MCNC: 15.9 G/DL (ref 14–18)
HGB UR QL STRIP: NEGATIVE
KETONES UR QL STRIP: NEGATIVE MG/DL
LEUKOCYTE ESTERASE UR QL STRIP: NEGATIVE
LYMPHOCYTES # BLD AUTO: 2.91 10*3/UL (ref 1–5)
LYMPHOCYTES NFR BLD AUTO: 44.9 % (ref 20–45)
MCH RBC QN AUTO: 30.6 PG (ref 27–31)
MCHC RBC AUTO-ENTMCNC: 32.8 G/DL (ref 33–37)
MCV RBC AUTO: 93.3 FL (ref 80–96)
MONOCYTES # BLD AUTO: 0.6 10*3/UL (ref 0.2–1.2)
MONOCYTES NFR BLD AUTO: 9.3 % (ref 3–10)
NEUTROPHILS # BLD AUTO: 2.68 10*3/UL (ref 2–8)
NEUTROPHILS NFR BLD AUTO: 41.3 % (ref 30–85)
NITRITE UR QL STRIP: NEGATIVE
NRBC CBCN: 0 % (ref 0–0.7)
OSMOLALITY SERPL CALC.SUM OF ELEC: 293 MOSM/KG (ref 273–304)
PH UR STRIP.AUTO: 5 [PH] (ref 5–8)
PLATELET # BLD AUTO: 210 10*3/UL (ref 130–400)
PMV BLD AUTO: 11.3 FL (ref 9.4–12.4)
POTASSIUM SERPL-SCNC: 4.5 MMOL/L (ref 3.5–5.3)
PROT UR QL: NEGATIVE MG/DL
RBC # BLD AUTO: 5.2 10*6/UL (ref 4.7–6.1)
SODIUM SERPL-SCNC: 140 MMOL/L (ref 135–147)
SP GR UR: 1.02 (ref 1–1.03)
WBC # BLD AUTO: 6.48 10*3/UL (ref 4.8–10.8)

## 2021-01-13 LAB — BACTERIA UR CULT: NORMAL

## 2021-01-20 ENCOUNTER — OFFICE VISIT (OUTPATIENT)
Dept: ORTHOPEDIC SURGERY | Facility: CLINIC | Age: 61
End: 2021-01-20

## 2021-01-20 VITALS — WEIGHT: 268 LBS | TEMPERATURE: 97 F | BODY MASS INDEX: 38.37 KG/M2 | HEIGHT: 70 IN

## 2021-01-20 DIAGNOSIS — M25.561 PAIN IN BOTH KNEES, UNSPECIFIED CHRONICITY: ICD-10-CM

## 2021-01-20 DIAGNOSIS — M25.562 PAIN IN BOTH KNEES, UNSPECIFIED CHRONICITY: ICD-10-CM

## 2021-01-20 DIAGNOSIS — M17.0 PRIMARY OSTEOARTHRITIS OF BOTH KNEES: Primary | ICD-10-CM

## 2021-01-20 PROCEDURE — 73562 X-RAY EXAM OF KNEE 3: CPT | Performed by: ORTHOPAEDIC SURGERY

## 2021-01-20 PROCEDURE — 99204 OFFICE O/P NEW MOD 45 MIN: CPT | Performed by: ORTHOPAEDIC SURGERY

## 2021-01-20 PROCEDURE — 20610 DRAIN/INJ JOINT/BURSA W/O US: CPT | Performed by: ORTHOPAEDIC SURGERY

## 2021-01-20 RX ORDER — METHYLPREDNISOLONE ACETATE 80 MG/ML
80 INJECTION, SUSPENSION INTRA-ARTICULAR; INTRALESIONAL; INTRAMUSCULAR; SOFT TISSUE
Status: COMPLETED | OUTPATIENT
Start: 2021-01-20 | End: 2021-01-20

## 2021-01-20 RX ORDER — CLOMIPRAMINE HYDROCHLORIDE 50 MG/1
CAPSULE ORAL
COMMUNITY
Start: 2020-12-22

## 2021-01-20 RX ADMIN — METHYLPREDNISOLONE ACETATE 80 MG: 80 INJECTION, SUSPENSION INTRA-ARTICULAR; INTRALESIONAL; INTRAMUSCULAR; SOFT TISSUE at 15:50

## 2021-01-20 NOTE — PROGRESS NOTES
"Patient Name: Ronnie Baron   YOB: 1960  Referring Primary Care Physician: Ness Martinez, DONA  BMI: Body mass index is 38.45 kg/m².    Chief Complaint:    Chief Complaint   Patient presents with   • Left Knee - Establish Care   • Right Knee - Establish Care        HPI:     Ronnie Baron is a 60 y.o. male who presents today for evaluation of   Chief Complaint   Patient presents with   • Left Knee - Establish Care   • Right Knee - Establish Care   .  Patient is retired  for [employer] who has had off-and-on bilateral knee pain.  He says it is hard to walk.  They get sore and achy.  He denies any trauma.  He has taken some medications for it but reviewing his chart he has stage III kidney disease and is supposed to stick with acetaminophen products.  Current BMI is at 38 but he has fair amount of central obesity.  He says \"the meaning to work on it\".  It hurts him a lot at night when he tries to go to sleep he goes up and down stairs      Subjective   Medications:   Home Medications:  Current Outpatient Medications on File Prior to Visit   Medication Sig   • aspirin 81 MG EC tablet Take 81 mg by mouth Daily.   • clomiPRAMINE (ANAFRANIL) 50 MG capsule    • CLOMIPRAMINE HCL PO Take 100 mg by mouth Every Night.   • clonazePAM (KlonoPIN) 0.5 MG tablet    • fluvoxaMINE (LUVOX) 100 MG tablet    • gabapentin (NEURONTIN) 600 MG tablet take 1 tablet by mouth TWICE a day   • levothyroxine (Synthroid) 50 MCG tablet Take 1 tablet by mouth Daily. For thyroid before breakfast   • Multiple Vitamins-Minerals (MENS MULTI VITAMIN & MINERAL PO) Take  by mouth.   • OLANZapine (zyPREXA) 7.5 MG tablet    • rosuvastatin (Crestor) 10 MG tablet one PO daily For cholesterol   • traZODone (DESYREL) 50 MG tablet    • Vitamin D, Cholecalciferol, 1000 UNITS tablet Take 2,000 Units by mouth daily.     No current facility-administered medications on file prior to visit.      Current Medications:  Scheduled Meds:  Continuous " Infusions:No current facility-administered medications for this visit.     PRN Meds:.    I have reviewed the patient's medical history in detail and updated the computerized patient record.  Review and summarization of old records includes:    Past Medical History:   Diagnosis Date   • Alteration in appetite    • Anxiety    • CKD (chronic kidney disease) stage 3, GFR 30-59 ml/min    • Depression     severe recurrent major depression with psychotic features   • DVT (deep venous thrombosis) (CMS/HCC)    • Hyperglycemia    • Hyperlipidemia    • OCD (obsessive compulsive disorder)    • TALON on AUTOCPAP 2019    Moderate severity TALON with AHI 20 events per hour.  Sleep related hypoxia present.   • Renal insufficiency    • Tachycardia    • Vitamin D deficiency         Past Surgical History:   Procedure Laterality Date   • ELBOW PROCEDURE  2014        Social History     Occupational History   • Occupation:      Comment: [employer]   Tobacco Use   • Smoking status: Former Smoker     Packs/day: 1.00     Types: Cigarettes     Quit date:      Years since quittin.0   • Smokeless tobacco: Former User     Quit date: 1990   Substance and Sexual Activity   • Alcohol use: Yes     Comment: rare occasional just pleasure  none since 2016   • Drug use: No   • Sexual activity: Not Currently     Partners: Female     Birth control/protection: Other      Social History     Social History Narrative   • Not on file        Family History   Problem Relation Age of Onset   • Hypertension Mother    • Stroke Mother         2016 with two blood clots   • Depression Mother         takes welbutrin   • Hypertension Father    • Depression Father         takes medicine care give of wife   • Heart disease Maternal Grandfather    • Cancer Maternal Grandmother        ROS: 14 point review of systems was performed and all other systems were reviewed and are negative except for documented  "findings in HPI and today's encounter.     Allergies: No Known Allergies  Constitutional:  Denies fever, shaking or chills   Eyes:  Denies change in visual acuity   HENT:  Denies nasal congestion or sore throat   Respiratory:  Denies cough or shortness of breath   Cardiovascular:  Denies chest pain or severe LE edema   GI:  Denies abdominal pain, nausea, vomiting, bloody stools or diarrhea   Musculoskeletal:  Numbness, tingling, pain, or loss of motor function only as noted above in history of present illness.  : Denies painful urination or hematuria  Integument:  Denies rash, lesion or ulceration   Neurologic:  Denies headache or focal weakness  Endocrine:  Denies lymphadenopathy  Psych:  Denies confusion or change in mental status   Hem:  Denies active bleeding    OBJECTIVE:  Physical Exam: 60 y.o. male  Wt Readings from Last 3 Encounters:   01/20/21 122 kg (268 lb)   09/09/20 125 kg (275 lb)   08/03/20 123 kg (271 lb)     Ht Readings from Last 1 Encounters:   01/20/21 177.8 cm (70\")     Body mass index is 38.45 kg/m².  Vitals:    01/20/21 1532   Temp: 97 °F (36.1 °C)     Vital signs reviewed.     General Appearance:    Alert, cooperative, in no acute distress                  Eyes: conjunctiva clear  ENT: external ears and nose atraumatic  CV: no peripheral edema  Resp: normal respiratory effort  Skin: no rashes or wounds; normal turgor  Psych: mood and affect appropriate  Lymph: no nodes appreciated  Neuro: gross sensation intact  Vascular:  Palpable peripheral pulse in noted extremity  Musculoskeletal Extremities: Exam today shows pleasant gentleman some central obesity BMI 38 knees bilaterally crepitation synovitis swelling varus and joint line tenderness Zay's is negative and he does have a start up limp    Radiology:   AP lateral 4 degree PA x-ray bilateral knees taken the office today for pain without comparison views available show arthritis with varus pattern    Assessment:     ICD-10-CM ICD-9-CM "   1. Pain in both knees, unspecified chronicity  M25.561 719.46    M25.562    2. Primary osteoarthritis of both knees  M17.0 715.16        Large Joint Arthrocentesis: R knee  Date/Time: 1/20/2021 3:50 PM  Consent given by: patient  Site marked: site marked  Timeout: Immediately prior to procedure a time out was called to verify the correct patient, procedure, equipment, support staff and site/side marked as required   Supporting Documentation  Indications: pain and joint swelling   Procedure Details  Location: knee - R knee  Preparation: Patient was prepped and draped in the usual sterile fashion  Needle size: 22 G  Approach: anterolateral  Medications administered: 4 mL lidocaine (cardiac); 80 mg methylPREDNISolone acetate 80 MG/ML  Patient tolerance: patient tolerated the procedure well with no immediate complications    Large Joint Arthrocentesis: L knee  Date/Time: 1/20/2021 3:50 PM  Consent given by: patient  Site marked: site marked  Timeout: Immediately prior to procedure a time out was called to verify the correct patient, procedure, equipment, support staff and site/side marked as required   Supporting Documentation  Indications: pain and joint swelling   Procedure Details  Location: knee - L knee  Preparation: Patient was prepped and draped in the usual sterile fashion  Needle size: 22 G  Approach: anterolateral  Medications administered: 4 mL lidocaine (cardiac); 80 mg methylPREDNISolone acetate 80 MG/ML  Patient tolerance: patient tolerated the procedure well with no immediate complications             Plan: The diagnosis(es), natural history, pathophysiology and treatment for diagnosis(es) were discussed. Opportunity given and questions answered.  Biomechanics of pertinent body areas discussed.  When appropriate, the use of ambulatory aids discussed.  BMI:  The concept of BMI body mass index and its importance and implications discussed.    EXERCISES:  Advice on benefits of, and types of  regular/moderate exercise pertaining to orthopedic diagnosis(es).  MEDICATIONS:  The risks, benefits, warnings,side effects and alternatives of medications discussed.  Inflammation/pain control; with cold, heat, elevation and/or liniments discussed as appropriate  Cortisone Injection. See procedure note.   He said he will try to lose weight we will try the injections.  Do not want him to take anti-inflammatories because of his kidneys and this was discussed.  Reviewed his records.  Her questions.  Can get physical therapy if he is not getting better over the next few weeks and explained the rationale behind that.      1/20/2021    Much of this encounter note is an electronic transcription/translation of spoken language to printed text. The electronic translation of spoken language may permit erroneous, or at times, nonsensical words or phrases to be inadvertently transcribed; Although I have reviewed the note for such errors, some may still exist

## 2021-04-21 ENCOUNTER — OFFICE VISIT (OUTPATIENT)
Dept: ORTHOPEDIC SURGERY | Facility: CLINIC | Age: 61
End: 2021-04-21

## 2021-04-21 VITALS — WEIGHT: 270 LBS | BODY MASS INDEX: 38.65 KG/M2 | TEMPERATURE: 98.4 F | HEIGHT: 70 IN

## 2021-04-21 DIAGNOSIS — M17.0 PRIMARY OSTEOARTHRITIS OF BOTH KNEES: ICD-10-CM

## 2021-04-21 DIAGNOSIS — M25.561 PAIN IN BOTH KNEES, UNSPECIFIED CHRONICITY: Primary | ICD-10-CM

## 2021-04-21 DIAGNOSIS — M25.562 PAIN IN BOTH KNEES, UNSPECIFIED CHRONICITY: Primary | ICD-10-CM

## 2021-04-21 PROCEDURE — 73562 X-RAY EXAM OF KNEE 3: CPT | Performed by: ORTHOPAEDIC SURGERY

## 2021-04-21 PROCEDURE — 99213 OFFICE O/P EST LOW 20 MIN: CPT | Performed by: ORTHOPAEDIC SURGERY

## 2021-04-21 NOTE — PROGRESS NOTES
Patient Name: Ronnie Baron   YOB: 1960  Referring Primary Care Physician: Ness Martinez PA-C  BMI: Body mass index is 38.74 kg/m².    Chief Complaint:    Chief Complaint   Patient presents with   • Left Knee - Follow-up, Pain   • Right Knee - Follow-up, Pain        HPI:     Ronnie Baron is a 60 y.o. male who presents today for evaluation of   Chief Complaint   Patient presents with   • Left Knee - Follow-up, Pain   • Right Knee - Follow-up, Pain   . The patient states that his right knee is doing pretty well, but he had a fall at the recycling place over in [place] and now his left knee is hurting him. The pain worsens with rising after sitting and walking long distance.     Subjective   Medications:   Home Medications:  Current Outpatient Medications on File Prior to Visit   Medication Sig   • aspirin 81 MG EC tablet Take 81 mg by mouth Daily.   • clomiPRAMINE (ANAFRANIL) 50 MG capsule    • CLOMIPRAMINE HCL PO Take 100 mg by mouth Every Night.   • clonazePAM (KlonoPIN) 0.5 MG tablet    • fluvoxaMINE (LUVOX) 100 MG tablet    • gabapentin (NEURONTIN) 600 MG tablet take 1 tablet by mouth TWICE a day   • levothyroxine (Synthroid) 50 MCG tablet Take 1 tablet by mouth Daily. For thyroid before breakfast   • Multiple Vitamins-Minerals (MENS MULTI VITAMIN & MINERAL PO) Take  by mouth.   • OLANZapine (zyPREXA) 7.5 MG tablet    • rosuvastatin (Crestor) 10 MG tablet one PO daily For cholesterol   • traZODone (DESYREL) 50 MG tablet    • Vitamin D, Cholecalciferol, 1000 UNITS tablet Take 2,000 Units by mouth daily.     No current facility-administered medications on file prior to visit.     Current Medications:  Scheduled Meds:  Continuous Infusions:No current facility-administered medications for this visit.    PRN Meds:.    I have reviewed the patient's medical history in detail and updated the computerized patient record.  Review and summarization of old records includes:    Past Medical History:    Diagnosis Date   • Alteration in appetite    • Anxiety    • CKD (chronic kidney disease) stage 3, GFR 30-59 ml/min (CMS/[facility])    • Depression     severe recurrent major depression with psychotic features   • DVT (deep venous thrombosis) (CMS/[facility])    • Hyperglycemia    • Hyperlipidemia    • OCD (obsessive compulsive disorder)    • TALON on AUTOCPAP 2019    Moderate severity TALON with AHI 20 events per hour.  Sleep related hypoxia present.   • Renal insufficiency    • Tachycardia    • Vitamin D deficiency         Past Surgical History:   Procedure Laterality Date   • ELBOW PROCEDURE  2014        Social History     Occupational History   • Occupation:      Comment: [employer]   Tobacco Use   • Smoking status: Former Smoker     Packs/day: 1.00     Types: Cigarettes     Quit date:      Years since quittin.3   • Smokeless tobacco: Former User     Quit date: 1990   Substance and Sexual Activity   • Alcohol use: Yes     Comment: rare occasional just pleasure  none since 2016   • Drug use: No   • Sexual activity: Not Currently     Partners: Female     Birth control/protection: Other      Social History     Social History Narrative   • Not on file        Family History   Problem Relation Age of Onset   • Hypertension Mother    • Stroke Mother         2016 with two blood clots   • Depression Mother         takes welbutrin   • Hypertension Father    • Depression Father         takes medicine care give of wife   • Heart disease Maternal Grandfather    • Cancer Maternal Grandmother        ROS: 14 point review of systems was performed and all other systems were reviewed and are negative except for documented findings in HPI and today's encounter.     Allergies: No Known Allergies  Constitutional:  Denies fever, shaking or chills   Eyes:  Denies change in visual acuity   HENT:  Denies nasal congestion or sore throat   Respiratory:  Denies cough or shortness of breath  "  Cardiovascular:  Denies chest pain or severe LE edema   GI:  Denies abdominal pain, nausea, vomiting, bloody stools or diarrhea   Musculoskeletal:  Numbness, tingling, pain, or loss of motor function only as noted above in history of present illness.  : Denies painful urination or hematuria  Integument:  Denies rash, lesion or ulceration   Neurologic:  Denies headache or focal weakness  Endocrine:  Denies lymphadenopathy  Psych:  Denies confusion or change in mental status   Hem:  Denies active bleeding    OBJECTIVE:  Physical Exam: 60 y.o. male  Wt Readings from Last 3 Encounters:   04/21/21 122 kg (270 lb)   01/20/21 122 kg (268 lb)   09/09/20 125 kg (275 lb)     Ht Readings from Last 1 Encounters:   04/21/21 177.8 cm (70\")     Body mass index is 38.74 kg/m².  Vitals:    04/21/21 1553   Temp: 98.4 °F (36.9 °C)     Vital signs reviewed.     General Appearance:    Alert, cooperative, in no acute distress                  Eyes: conjunctiva clear  ENT: external ears and nose atraumatic  CV: no peripheral edema  Resp: normal respiratory effort  Skin: no rashes or wounds; normal turgor  Psych: mood and affect appropriate  Lymph: no nodes appreciated  Neuro: gross sensation intact  Vascular:  Palpable peripheral pulse in noted extremity  Musculoskeletal Extremities: Exam today shows crepitation synovitis swelling and joint line tenderness bilateral knees Zay's negative    Radiology:   AP lateral 40 degree PA x-ray bilateral knees taken the office today for pain comparison view available shows arthritis      Large Joint Arthrocentesis: L knee  Date/Time: 4/22/2021 11:57 AM  Consent given by: patient  Site marked: site marked  Timeout: Immediately prior to procedure a time out was called to verify the correct patient, procedure, equipment, support staff and site/side marked as required   Supporting Documentation  Indications: pain and joint swelling   Procedure Details  Location: knee - L knee  Preparation: " Patient was prepped and draped in the usual sterile fashion  Needle size: 22 G  Approach: anterolateral  Medications administered: 80 mg methylPREDNISolone acetate 80 MG/ML; 4 mL lidocaine (cardiac)  Patient tolerance: patient tolerated the procedure well with no immediate complications            Assessment:     ICD-10-CM ICD-9-CM   1. Pain in both knees, unspecified chronicity  M25.561 719.46    M25.562    2. Primary osteoarthritis of both knees  M17.0 715.16        MDM/Plan:   The diagnosis(es), natural history, pathophysiology and treatment for diagnosis(es) were discussed. Opportunity given and questions answered.  Biomechanics of pertinent body areas discussed.  When appropriate, the use of ambulatory aids discussed.     As his right knee is doing well today, I do not think we need to pursue further treatment for it at this time. I recommended we do an injection for his left knee, which he was agreeable today. If he fails conservative treatment, we will then consider surgical intervention.    Cortisone Injection. See procedure note.      4/22/2021    Scribed for Alpesh Alonso MD by Adriana Craft.  04/21/21   16:55 EDT    I have personally performed the services described in this document as scribed by the above individual, and it is both accurate and complete.  Alpesh Alonso MD  4/22/2021  11:58 EDT

## 2021-04-22 PROCEDURE — 20610 DRAIN/INJ JOINT/BURSA W/O US: CPT | Performed by: ORTHOPAEDIC SURGERY

## 2021-04-22 RX ORDER — METHYLPREDNISOLONE ACETATE 80 MG/ML
80 INJECTION, SUSPENSION INTRA-ARTICULAR; INTRALESIONAL; INTRAMUSCULAR; SOFT TISSUE
Status: COMPLETED | OUTPATIENT
Start: 2021-04-22 | End: 2021-04-22

## 2021-04-22 RX ADMIN — METHYLPREDNISOLONE ACETATE 80 MG: 80 INJECTION, SUSPENSION INTRA-ARTICULAR; INTRALESIONAL; INTRAMUSCULAR; SOFT TISSUE at 11:57

## 2021-04-29 RX ORDER — LEVOTHYROXINE SODIUM 50 MCG
TABLET ORAL
Qty: 90 TABLET | Refills: 3 | Status: SHIPPED | OUTPATIENT
Start: 2021-04-29 | End: 2022-07-13 | Stop reason: SDUPTHER

## 2021-05-09 VITALS
DIASTOLIC BLOOD PRESSURE: 72 MMHG | SYSTOLIC BLOOD PRESSURE: 130 MMHG | OXYGEN SATURATION: 96 % | HEIGHT: 70 IN | TEMPERATURE: 96.9 F | WEIGHT: 264.44 LBS | BODY MASS INDEX: 37.86 KG/M2 | HEART RATE: 102 BPM

## 2021-06-24 ENCOUNTER — LAB (OUTPATIENT)
Dept: LAB | Facility: HOSPITAL | Age: 61
End: 2021-06-24

## 2021-06-24 ENCOUNTER — TRANSCRIBE ORDERS (OUTPATIENT)
Dept: ADMINISTRATIVE | Facility: HOSPITAL | Age: 61
End: 2021-06-24

## 2021-06-24 DIAGNOSIS — N18.30 MALIGNANT HYPERTENSIVE HEART AND CHRONIC KIDNEY DISEASE STAGE III (HCC): ICD-10-CM

## 2021-06-24 DIAGNOSIS — N18.30 MALIGNANT HYPERTENSIVE HEART AND CHRONIC KIDNEY DISEASE STAGE III (HCC): Primary | ICD-10-CM

## 2021-06-24 DIAGNOSIS — I13.10 MALIGNANT HYPERTENSIVE HEART AND CHRONIC KIDNEY DISEASE STAGE III (HCC): ICD-10-CM

## 2021-06-24 DIAGNOSIS — I13.10 MALIGNANT HYPERTENSIVE HEART AND CHRONIC KIDNEY DISEASE STAGE III (HCC): Primary | ICD-10-CM

## 2021-06-24 LAB
ANION GAP SERPL CALCULATED.3IONS-SCNC: 8.8 MMOL/L (ref 5–15)
BASOPHILS # BLD AUTO: 0.05 10*3/MM3 (ref 0–0.2)
BASOPHILS NFR BLD AUTO: 0.9 % (ref 0–1.5)
BILIRUB UR QL STRIP: NEGATIVE
BUN SERPL-MCNC: 15 MG/DL (ref 6–20)
BUN/CREAT SERPL: 9.7 (ref 7–25)
CALCIUM SPEC-SCNC: 9.2 MG/DL (ref 8.6–10.5)
CHLORIDE SERPL-SCNC: 101 MMOL/L (ref 98–107)
CLARITY UR: CLEAR
CO2 SERPL-SCNC: 27.2 MMOL/L (ref 22–29)
COLOR UR: YELLOW
CREAT SERPL-MCNC: 1.55 MG/DL (ref 0.76–1.27)
DEPRECATED RDW RBC AUTO: 42.5 FL (ref 37–54)
EOSINOPHIL # BLD AUTO: 0.08 10*3/MM3 (ref 0–0.4)
EOSINOPHIL NFR BLD AUTO: 1.4 % (ref 0.3–6.2)
ERYTHROCYTE [DISTWIDTH] IN BLOOD BY AUTOMATED COUNT: 12.7 % (ref 12.3–15.4)
GFR SERPL CREATININE-BSD FRML MDRD: 46 ML/MIN/1.73
GLUCOSE SERPL-MCNC: 100 MG/DL (ref 65–99)
GLUCOSE UR STRIP-MCNC: NEGATIVE MG/DL
HCT VFR BLD AUTO: 48.7 % (ref 37.5–51)
HGB BLD-MCNC: 16.3 G/DL (ref 13–17.7)
HGB UR QL STRIP.AUTO: NEGATIVE
IMM GRANULOCYTES # BLD AUTO: 0.04 10*3/MM3 (ref 0–0.05)
IMM GRANULOCYTES NFR BLD AUTO: 0.7 % (ref 0–0.5)
KETONES UR QL STRIP: NEGATIVE
LEUKOCYTE ESTERASE UR QL STRIP.AUTO: NEGATIVE
LYMPHOCYTES # BLD AUTO: 1.78 10*3/MM3 (ref 0.7–3.1)
LYMPHOCYTES NFR BLD AUTO: 30.9 % (ref 19.6–45.3)
MCH RBC QN AUTO: 30.8 PG (ref 26.6–33)
MCHC RBC AUTO-ENTMCNC: 33.5 G/DL (ref 31.5–35.7)
MCV RBC AUTO: 91.9 FL (ref 79–97)
MONOCYTES # BLD AUTO: 0.41 10*3/MM3 (ref 0.1–0.9)
MONOCYTES NFR BLD AUTO: 7.1 % (ref 5–12)
NEUTROPHILS NFR BLD AUTO: 3.4 10*3/MM3 (ref 1.7–7)
NEUTROPHILS NFR BLD AUTO: 59 % (ref 42.7–76)
NITRITE UR QL STRIP: NEGATIVE
NRBC BLD AUTO-RTO: 0 /100 WBC (ref 0–0.2)
PH UR STRIP.AUTO: 8 [PH] (ref 5–8)
PLATELET # BLD AUTO: 239 10*3/MM3 (ref 140–450)
PMV BLD AUTO: 10.9 FL (ref 6–12)
POTASSIUM SERPL-SCNC: 5.2 MMOL/L (ref 3.5–5.2)
PROT UR QL STRIP: NEGATIVE
RBC # BLD AUTO: 5.3 10*6/MM3 (ref 4.14–5.8)
SODIUM SERPL-SCNC: 137 MMOL/L (ref 136–145)
SP GR UR STRIP: 1.02 (ref 1–1.03)
UROBILINOGEN UR QL STRIP: NORMAL
WBC # BLD AUTO: 5.76 10*3/MM3 (ref 3.4–10.8)

## 2021-06-24 PROCEDURE — 80048 BASIC METABOLIC PNL TOTAL CA: CPT

## 2021-06-24 PROCEDURE — 81003 URINALYSIS AUTO W/O SCOPE: CPT

## 2021-06-24 PROCEDURE — 85025 COMPLETE CBC W/AUTO DIFF WBC: CPT

## 2021-06-24 PROCEDURE — 36415 COLL VENOUS BLD VENIPUNCTURE: CPT

## 2021-07-23 ENCOUNTER — CLINICAL SUPPORT (OUTPATIENT)
Dept: ORTHOPEDIC SURGERY | Facility: CLINIC | Age: 61
End: 2021-07-23

## 2021-07-23 VITALS — WEIGHT: 270 LBS | HEIGHT: 70 IN | BODY MASS INDEX: 38.65 KG/M2 | TEMPERATURE: 98 F

## 2021-07-23 DIAGNOSIS — M17.0 PRIMARY OSTEOARTHRITIS OF BOTH KNEES: Primary | ICD-10-CM

## 2021-07-23 DIAGNOSIS — R52 PAIN: ICD-10-CM

## 2021-07-23 PROCEDURE — 20610 DRAIN/INJ JOINT/BURSA W/O US: CPT | Performed by: NURSE PRACTITIONER

## 2021-07-23 RX ORDER — METHYLPREDNISOLONE ACETATE 80 MG/ML
80 INJECTION, SUSPENSION INTRA-ARTICULAR; INTRALESIONAL; INTRAMUSCULAR; SOFT TISSUE
Status: COMPLETED | OUTPATIENT
Start: 2021-07-23 | End: 2021-07-23

## 2021-07-23 RX ORDER — LIDOCAINE HYDROCHLORIDE 10 MG/ML
4 INJECTION, SOLUTION EPIDURAL; INFILTRATION; INTRACAUDAL; PERINEURAL
Status: COMPLETED | OUTPATIENT
Start: 2021-07-23 | End: 2021-07-23

## 2021-07-23 RX ADMIN — LIDOCAINE HYDROCHLORIDE 4 ML: 10 INJECTION, SOLUTION EPIDURAL; INFILTRATION; INTRACAUDAL; PERINEURAL at 15:56

## 2021-07-23 RX ADMIN — METHYLPREDNISOLONE ACETATE 80 MG: 80 INJECTION, SUSPENSION INTRA-ARTICULAR; INTRALESIONAL; INTRAMUSCULAR; SOFT TISSUE at 15:56

## 2021-07-23 NOTE — PROGRESS NOTES
7/23/2021    Ronnie Baron is here today for worsening knee pain. Pt has undergone injection of the knee in the past with good resolution of symptoms. Pt is requesting a repeat injection.    KNEE Injection Procedure Note:  Large Joint Arthrocentesis: R knee  Date/Time: 7/23/2021 3:56 PM  Consent given by: patient  Site marked: site marked  Timeout: Immediately prior to procedure a time out was called to verify the correct patient, procedure, equipment, support staff and site/side marked as required   Supporting Documentation  Indications: pain   Procedure Details  Location: knee - R knee  Preparation: Patient was prepped and draped in the usual sterile fashion  Needle gauge: 21G.  Approach: anterolateral  Medications administered: 80 mg methylPREDNISolone acetate 80 MG/ML; 4 mL lidocaine PF 1% 1 %  Patient tolerance: patient tolerated the procedure well with no immediate complications    Large Joint Arthrocentesis: L knee  Date/Time: 7/23/2021 3:56 PM  Consent given by: patient  Site marked: site marked  Timeout: Immediately prior to procedure a time out was called to verify the correct patient, procedure, equipment, support staff and site/side marked as required   Supporting Documentation  Indications: pain   Procedure Details  Location: knee - L knee  Preparation: Patient was prepped and draped in the usual sterile fashion  Needle gauge: 21G.  Approach: anterolateral  Medications administered: 80 mg methylPREDNISolone acetate 80 MG/ML; 4 mL lidocaine PF 1% 1 %  Patient tolerance: patient tolerated the procedure well with no immediate complications            At the conclusion of the injection I discussed the importance of continued quad strengthening exercises on a daily basis. I will see the patient back if the symptoms should fail to improve or worsen.    Tri Alves, APRN  7/23/2021

## 2021-07-31 RX ORDER — ROSUVASTATIN CALCIUM 10 MG/1
TABLET, COATED ORAL
Qty: 90 TABLET | Refills: 0 | Status: SHIPPED | OUTPATIENT
Start: 2021-07-31 | End: 2021-11-05

## 2021-10-26 ENCOUNTER — CLINICAL SUPPORT (OUTPATIENT)
Dept: ORTHOPEDIC SURGERY | Facility: CLINIC | Age: 61
End: 2021-10-26

## 2021-10-26 VITALS — BODY MASS INDEX: 38.65 KG/M2 | WEIGHT: 270 LBS | TEMPERATURE: 98 F | HEIGHT: 70 IN

## 2021-10-26 DIAGNOSIS — M17.0 PRIMARY OSTEOARTHRITIS OF BOTH KNEES: Primary | ICD-10-CM

## 2021-10-26 PROCEDURE — 20610 DRAIN/INJ JOINT/BURSA W/O US: CPT | Performed by: NURSE PRACTITIONER

## 2021-10-26 PROCEDURE — 73562 X-RAY EXAM OF KNEE 3: CPT | Performed by: NURSE PRACTITIONER

## 2021-10-26 RX ORDER — METHYLPREDNISOLONE ACETATE 80 MG/ML
80 INJECTION, SUSPENSION INTRA-ARTICULAR; INTRALESIONAL; INTRAMUSCULAR; SOFT TISSUE
Status: COMPLETED | OUTPATIENT
Start: 2021-10-26 | End: 2021-10-26

## 2021-10-26 RX ADMIN — METHYLPREDNISOLONE ACETATE 80 MG: 80 INJECTION, SUSPENSION INTRA-ARTICULAR; INTRALESIONAL; INTRAMUSCULAR; SOFT TISSUE at 09:38

## 2021-10-26 RX ADMIN — METHYLPREDNISOLONE ACETATE 80 MG: 80 INJECTION, SUSPENSION INTRA-ARTICULAR; INTRALESIONAL; INTRAMUSCULAR; SOFT TISSUE at 10:00

## 2021-10-26 NOTE — PROGRESS NOTES
10/26/2021    Ronnie Baron is here today for worsening knee pain. Pt has undergone injection of the knee in the past with good resolution of symptoms. Pt is requesting a repeat injection.   Radiology: AP, lateral, 40 degree PA bilateral knees obtained the office today due to pain with prior comparisons show advanced osteoarthritis of bilateral knees with varus pattern    KNEE Injection Procedure Note:    Large Joint Arthrocentesis: L knee  Date/Time: 10/26/2021 9:38 AM  Consent given by: patient  Site marked: site marked  Timeout: Immediately prior to procedure a time out was called to verify the correct patient, procedure, equipment, support staff and site/side marked as required   Supporting Documentation  Indications: pain   Procedure Details  Location: knee - L knee  Preparation: Patient was prepped and draped in the usual sterile fashion  Needle gauge: 21G.  Approach: anterolateral  Medications administered: 4 mL lidocaine (cardiac); 80 mg methylPREDNISolone acetate 80 MG/ML  Patient tolerance: patient tolerated the procedure well with no immediate complications    Large Joint Arthrocentesis: R knee  Date/Time: 10/26/2021 10:00 AM  Consent given by: patient  Site marked: site marked  Timeout: Immediately prior to procedure a time out was called to verify the correct patient, procedure, equipment, support staff and site/side marked as required   Supporting Documentation  Indications: pain   Procedure Details  Location: knee - R knee  Preparation: Patient was prepped and draped in the usual sterile fashion  Needle gauge: 21G.  Approach: anterolateral  Medications administered: 80 mg methylPREDNISolone acetate 80 MG/ML; 4 mL lidocaine (cardiac)  Patient tolerance: patient tolerated the procedure well with no immediate complications        At the conclusion of the injection I discussed the importance of continued quad strengthening exercises on a daily basis. I will see the patient back if the symptoms should  fail to improve or worsen.    Tri Alves, APRN  10/26/2021

## 2021-11-05 RX ORDER — ROSUVASTATIN CALCIUM 10 MG/1
TABLET, COATED ORAL
Qty: 90 TABLET | Refills: 0 | Status: SHIPPED | OUTPATIENT
Start: 2021-11-05 | End: 2022-01-21

## 2022-01-21 RX ORDER — ROSUVASTATIN CALCIUM 10 MG/1
TABLET, COATED ORAL
Qty: 90 TABLET | Refills: 0 | Status: SHIPPED | OUTPATIENT
Start: 2022-01-21 | End: 2022-07-13 | Stop reason: SDUPTHER

## 2022-02-08 ENCOUNTER — OFFICE VISIT (OUTPATIENT)
Dept: FAMILY MEDICINE CLINIC | Facility: CLINIC | Age: 62
End: 2022-02-08

## 2022-02-08 VITALS — TEMPERATURE: 97 F | HEART RATE: 104 BPM | OXYGEN SATURATION: 97 %

## 2022-02-08 DIAGNOSIS — R50.9 FEVER, UNSPECIFIED FEVER CAUSE: ICD-10-CM

## 2022-02-08 DIAGNOSIS — R06.2 WHEEZES: ICD-10-CM

## 2022-02-08 DIAGNOSIS — J02.9 SORE THROAT: ICD-10-CM

## 2022-02-08 DIAGNOSIS — R09.3 ABNORMAL COLOR OF SPUTUM: ICD-10-CM

## 2022-02-08 DIAGNOSIS — U07.1 COVID-19: Primary | ICD-10-CM

## 2022-02-08 LAB
EXPIRATION DATE: ABNORMAL
INTERNAL CONTROL: ABNORMAL
Lab: ABNORMAL
SARS-COV-2 AG UPPER RESP QL IA.RAPID: DETECTED

## 2022-02-08 PROCEDURE — 99213 OFFICE O/P EST LOW 20 MIN: CPT | Performed by: NURSE PRACTITIONER

## 2022-02-08 PROCEDURE — 87426 SARSCOV CORONAVIRUS AG IA: CPT | Performed by: NURSE PRACTITIONER

## 2022-02-08 RX ORDER — AZITHROMYCIN 250 MG/1
TABLET, FILM COATED ORAL
Qty: 6 TABLET | Refills: 0 | Status: SHIPPED | OUTPATIENT
Start: 2022-02-08 | End: 2022-07-13

## 2022-02-08 RX ORDER — ALBUTEROL SULFATE 90 UG/1
2 AEROSOL, METERED RESPIRATORY (INHALATION) EVERY 6 HOURS PRN
Qty: 18 G | Refills: 0 | Status: SHIPPED | OUTPATIENT
Start: 2022-02-08 | End: 2022-09-26

## 2022-02-08 RX ORDER — BENZONATATE 100 MG/1
100 CAPSULE ORAL 3 TIMES DAILY PRN
Qty: 30 CAPSULE | Refills: 0 | Status: SHIPPED | OUTPATIENT
Start: 2022-02-08 | End: 2022-07-13

## 2022-02-08 RX ORDER — CETIRIZINE HYDROCHLORIDE 10 MG/1
1 TABLET ORAL DAILY
COMMUNITY
End: 2022-09-26

## 2022-02-08 RX ORDER — METHYLPREDNISOLONE 4 MG/1
TABLET ORAL
Qty: 21 EACH | Refills: 0 | Status: SHIPPED | OUTPATIENT
Start: 2022-02-08 | End: 2022-07-13

## 2022-02-08 NOTE — PROGRESS NOTES
Answers for HPI/ROS submitted by the patient on 2/8/2022  What is the primary reason for your visit?: Cough    Chief Complaint  Fever (started 3-4 days ago, feels better today) and Sore Throat    Subjective            Ronnie Baron presents to [facility]  Fever   This is a new problem. The current episode started in the past 7 days. The problem occurs daily. The problem has been gradually improving. The maximum temperature noted was 100 to 100.9 F. The temperature was taken using an oral thermometer. Associated symptoms include coughing, headaches, a sore throat and wheezing. Pertinent negatives include no chest pain, diarrhea, ear pain, nausea, rash, urinary pain or vomiting. The treatment provided mild relief.   Sore Throat   This is a new problem. The current episode started in the past 7 days. The problem has been gradually improving. The maximum temperature recorded prior to his arrival was 100.4 - 100.9 F. The pain is mild. Associated symptoms include coughing and headaches. Pertinent negatives include no diarrhea, ear pain, shortness of breath or vomiting.   Cough  This is a new problem. The current episode started yesterday. The problem has been gradually worsening. The problem occurs hourly. The cough is productive of sputum. Associated symptoms include chills, a fever, headaches, nasal congestion, postnasal drip, a sore throat, sweats and wheezing. Pertinent negatives include no chest pain, ear congestion, ear pain, heartburn, hemoptysis, myalgias, rash, rhinorrhea, shortness of breath or weight loss. Nothing aggravates the symptoms.       PHQ-2 Total Score:    PHQ-9 Total Score:      Past Medical History:   Diagnosis Date   • Alteration in appetite    • Anxiety    • CKD (chronic kidney disease) stage 3, GFR 30-59 ml/min ([facility])    • Depression     severe recurrent major depression with psychotic features   • DVT (deep venous thrombosis) ([facility])    • Hyperglycemia    •  Hyperlipidemia    • OCD (obsessive compulsive disorder)    • TALON on AUTOCPAP 2019    Moderate severity TALON with AHI 20 events per hour.  Sleep related hypoxia present.   • Renal insufficiency    • Tachycardia    • Vitamin D deficiency        No Known Allergies     Past Surgical History:   Procedure Laterality Date   • ELBOW PROCEDURE  2014        Social History     Tobacco Use   • Smoking status: Former Smoker     Packs/day: 1.00     Types: Cigarettes     Quit date:      Years since quittin.1   • Smokeless tobacco: Former User     Quit date: 1990   Substance Use Topics   • Alcohol use: Yes     Comment: rare occasional just pleasure  none since 2016   • Drug use: No       Family History   Problem Relation Age of Onset   • Hypertension Mother    • Stroke Mother         2016 with two blood clots   • Depression Mother         takes welbutrin   • Hypertension Father    • Depression Father         takes medicine care give of wife   • Heart disease Maternal Grandfather    • Cancer Maternal Grandmother         Health Maintenance Due   Topic Date Due   • ANNUAL PHYSICAL  Never done   • COVID-19 Vaccine (1) Never done   • Pneumococcal Vaccine 0-64 (1 of 2 - PPSV23) Never done   • LIPID PANEL  2021   • COLORECTAL CANCER SCREENING  2022        Current Outpatient Medications on File Prior to Visit   Medication Sig   • aspirin 81 MG EC tablet Take 81 mg by mouth Daily.   • cetirizine (zyrTEC) 10 MG tablet Take 1 tablet by mouth Daily.   • clomiPRAMINE (ANAFRANIL) 50 MG capsule    • CLOMIPRAMINE HCL PO Take 100 mg by mouth Every Night.   • clonazePAM (KlonoPIN) 0.5 MG tablet    • fluvoxaMINE (LUVOX) 100 MG tablet    • gabapentin (NEURONTIN) 600 MG tablet take 1 tablet by mouth TWICE a day   • Multiple Vitamins-Minerals (MENS MULTI VITAMIN & MINERAL PO) Take  by mouth.   • OLANZapine (zyPREXA) 7.5 MG tablet    • rosuvastatin (CRESTOR) 10 MG tablet TAKE 1 TABLET DAILY FOR     CHOLESTEROL   • Synthroid 50 MCG tablet TAKE 1 TABLET DAILY BEFORE BREAKFAST FOR THYROID (NEW DOSE)   • traZODone (DESYREL) 50 MG tablet    • Vitamin D, Cholecalciferol, 1000 UNITS tablet Take 2,000 Units by mouth daily.     No current facility-administered medications on file prior to visit.       Immunization History   Administered Date(s) Administered   • Shingrix 07/17/2019, 10/23/2019   • Td 07/06/1998   • Tdap 07/01/2016       Review of Systems   Constitutional: Positive for chills and fever. Negative for unexpected weight loss.   HENT: Positive for postnasal drip and sore throat. Negative for ear pain, rhinorrhea and sinus pressure.    Eyes: Negative.    Respiratory: Positive for cough and wheezing. Negative for hemoptysis and shortness of breath.    Cardiovascular: Negative for chest pain.   Gastrointestinal: Negative for diarrhea, nausea and vomiting.   Genitourinary: Negative for dysuria.   Musculoskeletal: Negative for myalgias.   Skin: Negative for rash.   Neurological: Negative for dizziness, light-headedness and headache.        Objective     Pulse 104   Temp 97 °F (36.1 °C)   SpO2 97%       Physical Exam  Vitals and nursing note reviewed.   Constitutional:       Appearance: Normal appearance.   HENT:      Head: Normocephalic.      Right Ear: Tympanic membrane, ear canal and external ear normal.      Left Ear: External ear normal. There is impacted cerumen.      Nose: Nose normal.      Mouth/Throat:      Mouth: Mucous membranes are moist.      Pharynx: No oropharyngeal exudate or posterior oropharyngeal erythema.      Comments: hoarse   Eyes:      Pupils: Pupils are equal, round, and reactive to light.   Cardiovascular:      Rate and Rhythm: Normal rate and regular rhythm.      Heart sounds: Normal heart sounds.   Pulmonary:      Effort: Pulmonary effort is normal.      Breath sounds: Wheezing present.      Comments: Slight wheezes and cleared after cough   Abdominal:      General: Bowel sounds are  normal.      Palpations: Abdomen is soft.      Tenderness: There is no abdominal tenderness.   Musculoskeletal:         General: Normal range of motion.      Cervical back: Normal range of motion and neck supple.      Comments: Sitting in drivers seat car    Skin:     General: Skin is warm and dry.   Neurological:      Mental Status: He is alert and oriented to person, place, and time.   Psychiatric:         Mood and Affect: Mood normal.         Behavior: Behavior normal.         Judgment: Judgment normal.         Result Review :               POCT SARS-CoV-2 Antigen JAN (02/08/2022 13:39)             Assessment and Plan      Diagnoses and all orders for this visit:    1. COVID-19 (Primary)  -     methylPREDNISolone (MEDROL) 4 MG dose pack; Take as directed on package instructions.  Dispense: 21 each; Refill: 0  -     azithromycin (Zithromax Z-Cristiano) 250 MG tablet; Take 2 tablets by mouth on day 1, then 1 tablet daily on days 2-5  Dispense: 6 tablet; Refill: 0  -     benzonatate (Tessalon Perles) 100 MG capsule; Take 1 capsule by mouth 3 (Three) Times a Day As Needed for Cough.  Dispense: 30 capsule; Refill: 0  -     albuterol sulfate  (90 Base) MCG/ACT inhaler; Inhale 2 puffs Every 6 (Six) Hours As Needed for Wheezing or Shortness of Air.  Dispense: 18 g; Refill: 0    2. Wheezes  -     methylPREDNISolone (MEDROL) 4 MG dose pack; Take as directed on package instructions.  Dispense: 21 each; Refill: 0  -     azithromycin (Zithromax Z-Cristiano) 250 MG tablet; Take 2 tablets by mouth on day 1, then 1 tablet daily on days 2-5  Dispense: 6 tablet; Refill: 0  -     benzonatate (Tessalon Perles) 100 MG capsule; Take 1 capsule by mouth 3 (Three) Times a Day As Needed for Cough.  Dispense: 30 capsule; Refill: 0  -     albuterol sulfate  (90 Base) MCG/ACT inhaler; Inhale 2 puffs Every 6 (Six) Hours As Needed for Wheezing or Shortness of Air.  Dispense: 18 g; Refill: 0    3. Abnormal color of sputum  -      methylPREDNISolone (MEDROL) 4 MG dose pack; Take as directed on package instructions.  Dispense: 21 each; Refill: 0  -     azithromycin (Zithromax Z-Cristiano) 250 MG tablet; Take 2 tablets by mouth on day 1, then 1 tablet daily on days 2-5  Dispense: 6 tablet; Refill: 0  -     benzonatate (Tessalon Perles) 100 MG capsule; Take 1 capsule by mouth 3 (Three) Times a Day As Needed for Cough.  Dispense: 30 capsule; Refill: 0  -     albuterol sulfate  (90 Base) MCG/ACT inhaler; Inhale 2 puffs Every 6 (Six) Hours As Needed for Wheezing or Shortness of Air.  Dispense: 18 g; Refill: 0    4. Fever, unspecified fever cause  -     POCT SARS-CoV-2 Antigen JAN  -     methylPREDNISolone (MEDROL) 4 MG dose pack; Take as directed on package instructions.  Dispense: 21 each; Refill: 0  -     azithromycin (Zithromax Z-Cristiano) 250 MG tablet; Take 2 tablets by mouth on day 1, then 1 tablet daily on days 2-5  Dispense: 6 tablet; Refill: 0  -     benzonatate (Tessalon Perles) 100 MG capsule; Take 1 capsule by mouth 3 (Three) Times a Day As Needed for Cough.  Dispense: 30 capsule; Refill: 0  -     albuterol sulfate  (90 Base) MCG/ACT inhaler; Inhale 2 puffs Every 6 (Six) Hours As Needed for Wheezing or Shortness of Air.  Dispense: 18 g; Refill: 0    5. Sore throat  -     POCT SARS-CoV-2 Antigen JAN    advised to self quarantine and then stay well hydrated and then call or F/U should any s/s worsen or persist         Follow Up {Instructions Charge Capture  Follow-up Communications :23}    Return if symptoms worsen or fail to improve.

## 2022-03-02 ENCOUNTER — LAB (OUTPATIENT)
Dept: LAB | Facility: HOSPITAL | Age: 62
End: 2022-03-02

## 2022-03-02 ENCOUNTER — TRANSCRIBE ORDERS (OUTPATIENT)
Dept: ADMINISTRATIVE | Facility: HOSPITAL | Age: 62
End: 2022-03-02

## 2022-03-02 DIAGNOSIS — N18.30 STAGE 3 CHRONIC KIDNEY DISEASE, UNSPECIFIED WHETHER STAGE 3A OR 3B CKD: ICD-10-CM

## 2022-03-02 DIAGNOSIS — N18.30 STAGE 3 CHRONIC KIDNEY DISEASE, UNSPECIFIED WHETHER STAGE 3A OR 3B CKD: Primary | ICD-10-CM

## 2022-03-02 LAB
ANION GAP SERPL CALCULATED.3IONS-SCNC: 6.7 MMOL/L (ref 5–15)
BASOPHILS # BLD AUTO: 0.05 10*3/MM3 (ref 0–0.2)
BASOPHILS NFR BLD AUTO: 1 % (ref 0–1.5)
BILIRUB UR QL STRIP: NEGATIVE
BUN SERPL-MCNC: 18 MG/DL (ref 8–23)
BUN/CREAT SERPL: 12.7 (ref 7–25)
CALCIUM SPEC-SCNC: 9.4 MG/DL (ref 8.6–10.5)
CHLORIDE SERPL-SCNC: 102 MMOL/L (ref 98–107)
CLARITY UR: CLEAR
CO2 SERPL-SCNC: 29.3 MMOL/L (ref 22–29)
COLOR UR: YELLOW
CREAT SERPL-MCNC: 1.42 MG/DL (ref 0.76–1.27)
DEPRECATED RDW RBC AUTO: 44.9 FL (ref 37–54)
EGFRCR SERPLBLD CKD-EPI 2021: 56.6 ML/MIN/1.73
EOSINOPHIL # BLD AUTO: 0.08 10*3/MM3 (ref 0–0.4)
EOSINOPHIL NFR BLD AUTO: 1.6 % (ref 0.3–6.2)
ERYTHROCYTE [DISTWIDTH] IN BLOOD BY AUTOMATED COUNT: 13.6 % (ref 12.3–15.4)
GLUCOSE SERPL-MCNC: 98 MG/DL (ref 65–99)
GLUCOSE UR STRIP-MCNC: NEGATIVE MG/DL
HCT VFR BLD AUTO: 46.3 % (ref 37.5–51)
HGB BLD-MCNC: 15.5 G/DL (ref 13–17.7)
HGB UR QL STRIP.AUTO: NEGATIVE
IMM GRANULOCYTES # BLD AUTO: 0.05 10*3/MM3 (ref 0–0.05)
IMM GRANULOCYTES NFR BLD AUTO: 1 % (ref 0–0.5)
KETONES UR QL STRIP: NEGATIVE
LEUKOCYTE ESTERASE UR QL STRIP.AUTO: NEGATIVE
LYMPHOCYTES # BLD AUTO: 1.9 10*3/MM3 (ref 0.7–3.1)
LYMPHOCYTES NFR BLD AUTO: 38.2 % (ref 19.6–45.3)
MCH RBC QN AUTO: 30.3 PG (ref 26.6–33)
MCHC RBC AUTO-ENTMCNC: 33.5 G/DL (ref 31.5–35.7)
MCV RBC AUTO: 90.4 FL (ref 79–97)
MONOCYTES # BLD AUTO: 0.37 10*3/MM3 (ref 0.1–0.9)
MONOCYTES NFR BLD AUTO: 7.4 % (ref 5–12)
NEUTROPHILS NFR BLD AUTO: 2.53 10*3/MM3 (ref 1.7–7)
NEUTROPHILS NFR BLD AUTO: 50.8 % (ref 42.7–76)
NITRITE UR QL STRIP: NEGATIVE
NRBC BLD AUTO-RTO: 0 /100 WBC (ref 0–0.2)
PH UR STRIP.AUTO: 6.5 [PH] (ref 5–8)
PLATELET # BLD AUTO: 322 10*3/MM3 (ref 140–450)
PMV BLD AUTO: 10.5 FL (ref 6–12)
POTASSIUM SERPL-SCNC: 5.1 MMOL/L (ref 3.5–5.2)
PROT UR QL STRIP: NEGATIVE
RBC # BLD AUTO: 5.12 10*6/MM3 (ref 4.14–5.8)
SODIUM SERPL-SCNC: 138 MMOL/L (ref 136–145)
SP GR UR STRIP: 1.02 (ref 1–1.03)
UROBILINOGEN UR QL STRIP: NORMAL
WBC NRBC COR # BLD: 4.98 10*3/MM3 (ref 3.4–10.8)

## 2022-03-02 PROCEDURE — 81003 URINALYSIS AUTO W/O SCOPE: CPT

## 2022-03-02 PROCEDURE — 80048 BASIC METABOLIC PNL TOTAL CA: CPT

## 2022-03-02 PROCEDURE — 85025 COMPLETE CBC W/AUTO DIFF WBC: CPT

## 2022-03-02 PROCEDURE — 36415 COLL VENOUS BLD VENIPUNCTURE: CPT

## 2022-03-04 ENCOUNTER — TELEPHONE (OUTPATIENT)
Dept: ORTHOPEDIC SURGERY | Facility: CLINIC | Age: 62
End: 2022-03-04

## 2022-03-04 NOTE — TELEPHONE ENCOUNTER
Provider: AZIZA  Caller: NEMESIO  Phone Number:[phone]  Reason for Call: PATIENT IS CALLING TO GET SCHEDULED FOR HIS KNEE  INJECTIONS

## 2022-04-13 ENCOUNTER — CLINICAL SUPPORT (OUTPATIENT)
Dept: ORTHOPEDIC SURGERY | Facility: CLINIC | Age: 62
End: 2022-04-13

## 2022-04-13 VITALS — TEMPERATURE: 98 F | BODY MASS INDEX: 37.08 KG/M2 | RESPIRATION RATE: 18 BRPM | HEIGHT: 70 IN | WEIGHT: 259 LBS

## 2022-04-13 DIAGNOSIS — M17.0 PRIMARY OSTEOARTHRITIS OF BOTH KNEES: Primary | ICD-10-CM

## 2022-04-13 PROCEDURE — 99212 OFFICE O/P EST SF 10 MIN: CPT | Performed by: NURSE PRACTITIONER

## 2022-07-13 ENCOUNTER — OFFICE VISIT (OUTPATIENT)
Dept: FAMILY MEDICINE CLINIC | Facility: CLINIC | Age: 62
End: 2022-07-13

## 2022-07-13 VITALS
DIASTOLIC BLOOD PRESSURE: 76 MMHG | OXYGEN SATURATION: 94 % | HEIGHT: 70 IN | TEMPERATURE: 97.6 F | SYSTOLIC BLOOD PRESSURE: 122 MMHG | HEART RATE: 111 BPM | WEIGHT: 271 LBS | BODY MASS INDEX: 38.8 KG/M2

## 2022-07-13 DIAGNOSIS — Z12.11 SCREEN FOR COLON CANCER: ICD-10-CM

## 2022-07-13 DIAGNOSIS — E55.9 VITAMIN D DEFICIENCY: Chronic | ICD-10-CM

## 2022-07-13 DIAGNOSIS — E78.2 MIXED HYPERLIPIDEMIA: Chronic | ICD-10-CM

## 2022-07-13 DIAGNOSIS — Z12.5 SCREENING FOR PROSTATE CANCER: ICD-10-CM

## 2022-07-13 DIAGNOSIS — E03.9 HYPOTHYROIDISM, ACQUIRED: Primary | ICD-10-CM

## 2022-07-13 DIAGNOSIS — R73.9 HYPERGLYCEMIA: ICD-10-CM

## 2022-07-13 PROBLEM — E78.5 DYSLIPIDEMIA: Status: ACTIVE | Noted: 2022-07-13

## 2022-07-13 PROBLEM — Z86.718 PERSONAL HISTORY OF OTHER VENOUS THROMBOSIS AND EMBOLISM: Status: ACTIVE | Noted: 2017-04-20

## 2022-07-13 PROCEDURE — 99214 OFFICE O/P EST MOD 30 MIN: CPT | Performed by: NURSE PRACTITIONER

## 2022-07-13 RX ORDER — ROSUVASTATIN CALCIUM 10 MG/1
10 TABLET, COATED ORAL DAILY
Qty: 90 TABLET | Refills: 1 | Status: SHIPPED | OUTPATIENT
Start: 2022-07-13 | End: 2022-11-11

## 2022-07-13 RX ORDER — LEVOTHYROXINE SODIUM 0.05 MG/1
50 TABLET ORAL
Qty: 90 TABLET | Refills: 1 | Status: SHIPPED | OUTPATIENT
Start: 2022-07-13 | End: 2022-11-11

## 2022-07-13 NOTE — PROGRESS NOTES
Answers for HPI/ROS submitted by the patient on 7/11/2022  What is the primary reason for your visit?: Other  Please describe your symptoms.: Refills  Have you had these symptoms before?: No  How long have you been having these symptoms?: Greater than 2 weeks    Chief Complaint  Med Refill    Subjective            Ronnie Baron presents to [facility]  Patient is here today for medication refills on the chronic comorbid conditions today and will need fasting labs ordered as well    -- Dyslipidemia: Patient tolerates medication well with no side effects no issues reported, but admits that he knows he needs to lose weight and do better on his diet as he has Gained 12 pounds in the past 3 months    -- Hypothyroidism: Denies any fatigue and tolerates medication well no palpitations no chest pain no dizziness    -- Prior elevated glucose we will check the A1c and his labs this time denies any polyuria polydipsia or polyphagia    -- Prior vitamin D deficiency patient has not had labs for this in approximately a year or longer but he does deny having any pain or fatigue    Patient is due for some type of colon cancer screening and patient would like to do the Cologuard    And that he is also due for his prostate cancer screening he denies having any nocturia he does not get up at all at night and has no issues with emptying his bladder      PHQ-2 Total Score: 0  PHQ-9 Total Score: 0    Past Medical History:   Diagnosis Date   • Alteration in appetite    • Anxiety    • CKD (chronic kidney disease) stage 3, GFR 30-59 ml/min ([facility])    • Depression     severe recurrent major depression with psychotic features   • DVT (deep venous thrombosis) ([facility])    • Hyperglycemia    • Hyperlipidemia    • OCD (obsessive compulsive disorder)    • TALON on AUTOCPAP 02/26/2019    Moderate severity TALON with AHI 20 events per hour.  Sleep related hypoxia present.   • Renal insufficiency    • Tachycardia    • Vitamin  D deficiency        No Known Allergies     Past Surgical History:   Procedure Laterality Date   • ELBOW PROCEDURE  2014        Social History     Tobacco Use   • Smoking status: Former Smoker     Packs/day: 1.00     Types: Cigarettes     Quit date:      Years since quittin.5   • Smokeless tobacco: Former User     Quit date: 1990   Substance Use Topics   • Alcohol use: Yes     Comment: rare occasional just pleasure  none since 2016   • Drug use: No       Family History   Problem Relation Age of Onset   • Hypertension Mother    • Stroke Mother         2016 with two blood clots   • Depression Mother         takes welbutrin   • Hypertension Father    • Depression Father         takes medicine care give of wife   • Heart disease Maternal Grandfather    • Cancer Maternal Grandmother         Health Maintenance Due   Topic Date Due   • COVID-19 Vaccine (1) Never done   • ANNUAL PHYSICAL  Never done   • LIPID PANEL  2021   • COLORECTAL CANCER SCREENING  2022        Current Outpatient Medications on File Prior to Visit   Medication Sig   • albuterol sulfate  (90 Base) MCG/ACT inhaler Inhale 2 puffs Every 6 (Six) Hours As Needed for Wheezing or Shortness of Air.   • aspirin 81 MG EC tablet Take 81 mg by mouth Daily.   • cetirizine (zyrTEC) 10 MG tablet Take 1 tablet by mouth Daily.   • clomiPRAMINE (ANAFRANIL) 50 MG capsule    • CLOMIPRAMINE HCL PO Take 100 mg by mouth Every Night.   • clonazePAM (KlonoPIN) 0.5 MG tablet    • fluvoxaMINE (LUVOX) 100 MG tablet    • gabapentin (NEURONTIN) 600 MG tablet take 1 tablet by mouth TWICE a day   • Multiple Vitamins-Minerals (MENS MULTI VITAMIN & MINERAL PO) Take  by mouth.   • OLANZapine (zyPREXA) 7.5 MG tablet    • traZODone (DESYREL) 50 MG tablet    • Vitamin D, Cholecalciferol, 1000 UNITS tablet Take 2,000 Units by mouth daily.   • [DISCONTINUED] rosuvastatin (CRESTOR) 10 MG tablet TAKE 1 TABLET DAILY FOR    CHOLESTEROL   •  "[DISCONTINUED] Synthroid 50 MCG tablet TAKE 1 TABLET DAILY BEFORE BREAKFAST FOR THYROID (NEW DOSE)   • [DISCONTINUED] azithromycin (Zithromax Z-Cristiano) 250 MG tablet Take 2 tablets by mouth on day 1, then 1 tablet daily on days 2-5   • [DISCONTINUED] benzonatate (Tessalon Perles) 100 MG capsule Take 1 capsule by mouth 3 (Three) Times a Day As Needed for Cough.   • [DISCONTINUED] methylPREDNISolone (MEDROL) 4 MG dose pack Take as directed on package instructions.     No current facility-administered medications on file prior to visit.       Immunization History   Administered Date(s) Administered   • Shingrix 07/17/2019, 10/23/2019   • Td 07/06/1998   • Tdap 07/01/2016       Review of Systems   Constitutional: Negative for fatigue.   HENT: Negative for trouble swallowing.    Eyes: Negative for blurred vision and double vision.   Respiratory: Negative for choking and shortness of breath.    Cardiovascular: Negative for chest pain and palpitations.   Gastrointestinal: Negative for abdominal pain, blood in stool and GERD.   Endocrine: Negative for cold intolerance, heat intolerance, polydipsia, polyphagia and polyuria.   Genitourinary: Negative for dysuria and nocturia.   Musculoskeletal: Negative for back pain and neck pain.   Skin: Negative for rash and wound.   Neurological: Negative for dizziness, syncope and light-headedness.   Psychiatric/Behavioral: Negative for self-injury, suicidal ideas and depressed mood. The patient is not nervous/anxious.         Sees the specialist in [place]         Objective     /76 (BP Location: Left arm, Patient Position: Sitting, Cuff Size: Adult)   Pulse 111   Temp 97.6 °F (36.4 °C) (Temporal)   Ht 177.8 cm (70\")   Wt 123 kg (271 lb)   SpO2 94%   BMI 38.88 kg/m²       Physical Exam  Vitals and nursing note reviewed.   Constitutional:       Appearance: Normal appearance. He is obese.   HENT:      Head: Normocephalic.      Right Ear: External ear normal.      Left Ear: " External ear normal.      Nose: Nose normal.      Mouth/Throat:      Comments: Wearing mask  Eyes:      Pupils: Pupils are equal, round, and reactive to light.   Cardiovascular:      Rate and Rhythm: Normal rate and regular rhythm.      Heart sounds: Normal heart sounds.   Pulmonary:      Effort: Pulmonary effort is normal.      Breath sounds: Normal breath sounds.   Abdominal:      General: Bowel sounds are normal.      Palpations: Abdomen is soft.      Tenderness: There is no abdominal tenderness.   Musculoskeletal:         General: Normal range of motion.      Cervical back: Normal range of motion and neck supple.   Skin:     General: Skin is warm and dry.   Neurological:      Mental Status: He is alert and oriented to person, place, and time.   Psychiatric:         Mood and Affect: Mood normal.         Behavior: Behavior normal.         Thought Content: Thought content normal.         Judgment: Judgment normal.         Result Review :             SCANNED - COLSAMI (01/24/2019)               Assessment and Plan      Diagnoses and all orders for this visit:    1. Hypothyroidism, acquired (Primary)  -     levothyroxine (Synthroid) 50 MCG tablet; Take 1 tablet by mouth Every Morning.  Dispense: 90 tablet; Refill: 1  -     TSH+Free T4  -     CBC (No Diff)    2. Mixed hyperlipidemia  -     rosuvastatin (CRESTOR) 10 MG tablet; Take 1 tablet by mouth Daily. for cholesterol  Dispense: 90 tablet; Refill: 1  -     Lipid Panel With / Chol / HDL Ratio  -     Comprehensive Metabolic Panel  -     CBC (No Diff)    3. Hyperglycemia  -     CBC (No Diff)  -     Hemoglobin A1c    4. Vitamin D deficiency  -     Vitamin D 25 Hydroxy    5. Screen for colon cancer  -     Cologlorrainerd - Stool, Per Rectum; Future    6. Screening for prostate cancer  -     PSA Screen            Follow Up     Return in about 6 months (around 1/13/2023), or if symptoms worsen or fail to improve, for Recheck, fasting labs and refills.

## 2022-09-07 ENCOUNTER — LAB (OUTPATIENT)
Dept: LAB | Facility: HOSPITAL | Age: 62
End: 2022-09-07

## 2022-09-07 ENCOUNTER — TRANSCRIBE ORDERS (OUTPATIENT)
Dept: ADMINISTRATIVE | Facility: HOSPITAL | Age: 62
End: 2022-09-07

## 2022-09-07 DIAGNOSIS — N18.30 STAGE 3 CHRONIC KIDNEY DISEASE, UNSPECIFIED WHETHER STAGE 3A OR 3B CKD: Primary | ICD-10-CM

## 2022-09-07 DIAGNOSIS — N18.30 STAGE 3 CHRONIC KIDNEY DISEASE, UNSPECIFIED WHETHER STAGE 3A OR 3B CKD: ICD-10-CM

## 2022-09-07 LAB
25(OH)D3 SERPL-MCNC: 59.8 NG/ML (ref 30–100)
ALBUMIN SERPL-MCNC: 4.3 G/DL (ref 3.5–5.2)
ALBUMIN/GLOB SERPL: 1.7 G/DL
ALP SERPL-CCNC: 127 U/L (ref 39–117)
ALT SERPL W P-5'-P-CCNC: 43 U/L (ref 1–41)
ANION GAP SERPL CALCULATED.3IONS-SCNC: 9 MMOL/L (ref 5–15)
AST SERPL-CCNC: 33 U/L (ref 1–40)
BASOPHILS # BLD AUTO: 0.04 10*3/MM3 (ref 0–0.2)
BASOPHILS NFR BLD AUTO: 0.9 % (ref 0–1.5)
BILIRUB SERPL-MCNC: 0.2 MG/DL (ref 0–1.2)
BILIRUB UR QL STRIP: NEGATIVE
BUN SERPL-MCNC: 15 MG/DL (ref 8–23)
BUN/CREAT SERPL: 9.5 (ref 7–25)
CALCIUM SPEC-SCNC: 9.4 MG/DL (ref 8.6–10.5)
CHLORIDE SERPL-SCNC: 104 MMOL/L (ref 98–107)
CHOLEST SERPL-MCNC: 153 MG/DL (ref 0–200)
CLARITY UR: CLEAR
CO2 SERPL-SCNC: 27 MMOL/L (ref 22–29)
COLOR UR: YELLOW
CREAT SERPL-MCNC: 1.58 MG/DL (ref 0.76–1.27)
DEPRECATED RDW RBC AUTO: 41.3 FL (ref 37–54)
EGFRCR SERPLBLD CKD-EPI 2021: 49.5 ML/MIN/1.73
EOSINOPHIL # BLD AUTO: 0.09 10*3/MM3 (ref 0–0.4)
EOSINOPHIL NFR BLD AUTO: 1.9 % (ref 0.3–6.2)
ERYTHROCYTE [DISTWIDTH] IN BLOOD BY AUTOMATED COUNT: 13 % (ref 12.3–15.4)
GLOBULIN UR ELPH-MCNC: 2.5 GM/DL
GLUCOSE SERPL-MCNC: 101 MG/DL (ref 65–99)
GLUCOSE UR STRIP-MCNC: NEGATIVE MG/DL
HBA1C MFR BLD: 6 % (ref 4.8–5.6)
HCT VFR BLD AUTO: 44.1 % (ref 37.5–51)
HDLC SERPL QL: 3.33
HDLC SERPL-MCNC: 46 MG/DL (ref 40–60)
HGB BLD-MCNC: 15.1 G/DL (ref 13–17.7)
HGB UR QL STRIP.AUTO: NEGATIVE
IMM GRANULOCYTES # BLD AUTO: 0.04 10*3/MM3 (ref 0–0.05)
IMM GRANULOCYTES NFR BLD AUTO: 0.9 % (ref 0–0.5)
KETONES UR QL STRIP: NEGATIVE
LDLC SERPL CALC-MCNC: 76 MG/DL (ref 0–100)
LEUKOCYTE ESTERASE UR QL STRIP.AUTO: NEGATIVE
LYMPHOCYTES # BLD AUTO: 2 10*3/MM3 (ref 0.7–3.1)
LYMPHOCYTES NFR BLD AUTO: 43.2 % (ref 19.6–45.3)
MCH RBC QN AUTO: 30 PG (ref 26.6–33)
MCHC RBC AUTO-ENTMCNC: 34.2 G/DL (ref 31.5–35.7)
MCV RBC AUTO: 87.7 FL (ref 79–97)
MONOCYTES # BLD AUTO: 0.38 10*3/MM3 (ref 0.1–0.9)
MONOCYTES NFR BLD AUTO: 8.2 % (ref 5–12)
NEUTROPHILS NFR BLD AUTO: 2.08 10*3/MM3 (ref 1.7–7)
NEUTROPHILS NFR BLD AUTO: 44.9 % (ref 42.7–76)
NITRITE UR QL STRIP: NEGATIVE
NRBC BLD AUTO-RTO: 0 /100 WBC (ref 0–0.2)
PH UR STRIP.AUTO: 7 [PH] (ref 5–8)
PLATELET # BLD AUTO: 200 10*3/MM3 (ref 140–450)
PMV BLD AUTO: 10.9 FL (ref 6–12)
POTASSIUM SERPL-SCNC: 4.7 MMOL/L (ref 3.5–5.2)
PROT SERPL-MCNC: 6.8 G/DL (ref 6–8.5)
PROT UR QL STRIP: NEGATIVE
PSA SERPL-MCNC: 4.52 NG/ML (ref 0–4)
RBC # BLD AUTO: 5.03 10*6/MM3 (ref 4.14–5.8)
SODIUM SERPL-SCNC: 140 MMOL/L (ref 136–145)
SP GR UR STRIP: 1.02 (ref 1–1.03)
T4 FREE SERPL-MCNC: 0.92 NG/DL (ref 0.93–1.7)
TRIGL SERPL-MCNC: 183 MG/DL (ref 0–150)
TSH SERPL DL<=0.05 MIU/L-ACNC: 3.11 UIU/ML (ref 0.27–4.2)
UROBILINOGEN UR QL STRIP: NORMAL
VLDLC SERPL-MCNC: 31 MG/DL (ref 5–40)
WBC NRBC COR # BLD: 4.63 10*3/MM3 (ref 3.4–10.8)

## 2022-09-07 PROCEDURE — 84439 ASSAY OF FREE THYROXINE: CPT | Performed by: NURSE PRACTITIONER

## 2022-09-07 PROCEDURE — 80061 LIPID PANEL: CPT | Performed by: NURSE PRACTITIONER

## 2022-09-07 PROCEDURE — 36415 COLL VENOUS BLD VENIPUNCTURE: CPT

## 2022-09-07 PROCEDURE — 82306 VITAMIN D 25 HYDROXY: CPT | Performed by: NURSE PRACTITIONER

## 2022-09-07 PROCEDURE — 81003 URINALYSIS AUTO W/O SCOPE: CPT

## 2022-09-07 PROCEDURE — 80050 GENERAL HEALTH PANEL: CPT

## 2022-09-07 PROCEDURE — 83036 HEMOGLOBIN GLYCOSYLATED A1C: CPT | Performed by: NURSE PRACTITIONER

## 2022-09-07 PROCEDURE — G0103 PSA SCREENING: HCPCS | Performed by: NURSE PRACTITIONER

## 2022-09-08 ENCOUNTER — OFFICE VISIT (OUTPATIENT)
Dept: FAMILY MEDICINE CLINIC | Facility: CLINIC | Age: 62
End: 2022-09-08

## 2022-09-08 VITALS
BODY MASS INDEX: 37.74 KG/M2 | TEMPERATURE: 97.1 F | HEART RATE: 102 BPM | DIASTOLIC BLOOD PRESSURE: 70 MMHG | SYSTOLIC BLOOD PRESSURE: 136 MMHG | HEIGHT: 71 IN | OXYGEN SATURATION: 96 % | WEIGHT: 269.6 LBS

## 2022-09-08 DIAGNOSIS — S81.812S LACERATION OF LEFT LOWER EXTREMITY, SEQUELA: Primary | ICD-10-CM

## 2022-09-08 DIAGNOSIS — Z48.02 ENCOUNTER FOR REMOVAL OF SUTURES: ICD-10-CM

## 2022-09-08 PROCEDURE — 99024 POSTOP FOLLOW-UP VISIT: CPT | Performed by: NURSE PRACTITIONER

## 2022-09-08 RX ORDER — OLANZAPINE 5 MG/1
TABLET ORAL
COMMUNITY
Start: 2022-08-25

## 2022-09-08 NOTE — PROGRESS NOTES
Chief Complaint  Suture / Staple Removal (Suture removal, laceration left lower leg on 22)    Subjective        Past Medical History:   Diagnosis Date   • Alteration in appetite    • Anxiety    • CKD (chronic kidney disease) stage 3, GFR 30-59 ml/min ([facility])    • Depression     severe recurrent major depression with psychotic features   • DVT (deep venous thrombosis) ([facility])    • Hyperglycemia    • Hyperlipidemia    • OCD (obsessive compulsive disorder)    • TALON on AUTOCPAP 2019    Moderate severity TALON with AHI 20 events per hour.  Sleep related hypoxia present.   • Renal insufficiency    • Tachycardia    • Vitamin D deficiency      Social History     Tobacco Use   • Smoking status: Former Smoker     Packs/day: 1.00     Types: Cigarettes     Quit date:      Years since quittin.7   • Smokeless tobacco: Former User     Quit date: 1990   Vaping Use   • Vaping Use: Never used   Substance Use Topics   • Alcohol use: Yes     Comment: rare occasional just pleasure  none since 2016   • Drug use: No      Current Outpatient Medications on File Prior to Visit   Medication Sig   • albuterol sulfate  (90 Base) MCG/ACT inhaler Inhale 2 puffs Every 6 (Six) Hours As Needed for Wheezing or Shortness of Air.   • aspirin 81 MG EC tablet Take 81 mg by mouth Daily.   • cetirizine (zyrTEC) 10 MG tablet Take 1 tablet by mouth Daily.   • clomiPRAMINE (ANAFRANIL) 50 MG capsule    • clonazePAM (KlonoPIN) 0.5 MG tablet    • fluvoxaMINE (LUVOX) 100 MG tablet    • gabapentin (NEURONTIN) 600 MG tablet take 1 tablet by mouth TWICE a day   • levothyroxine (Synthroid) 50 MCG tablet Take 1 tablet by mouth Every Morning.   • Multiple Vitamins-Minerals (MENS MULTI VITAMIN & MINERAL PO) Take  by mouth.   • OLANZapine (zyPREXA) 7.5 MG tablet    • rosuvastatin (CRESTOR) 10 MG tablet Take 1 tablet by mouth Daily. for cholesterol   • traZODone (DESYREL) 50 MG tablet    • Vitamin D, Cholecalciferol, 1000 UNITS tablet Take  "2,000 Units by mouth daily.   • [DISCONTINUED] CLOMIPRAMINE HCL PO Take 100 mg by mouth Every Night.   • OLANZapine (zyPREXA) 5 MG tablet    • [DISCONTINUED] cephalexin (KEFLEX) 500 MG capsule Take 1 capsule by mouth 3 (Three) Times a Day.   • [DISCONTINUED] mupirocin (BACTROBAN) 2 % ointment Apply 1 application topically to the appropriate area as directed 3 (Three) Times a Day.     No current facility-administered medications on file prior to visit.      No Known Allergies   Health Maintenance Due   Topic Date Due   • COVID-19 Vaccine (1) Never done   • ANNUAL PHYSICAL  Never done      Ronnie Baron presents to [facility]  Here to have sutures removed from left lower leg.  Sutures were placed on 8/27/2022 at urgent care to the left lower leg after patient hit the area on a trailer or filing cabinet.  Patient has taken the Keflex at least twice daily.      Objective   Vital Signs:   /70 (BP Location: Left arm, Patient Position: Sitting, Cuff Size: Adult)   Pulse 102   Temp 97.1 °F (36.2 °C) (Temporal)   Ht 180.3 cm (71\")   Wt 122 kg (269 lb 9.6 oz)   SpO2 96%   BMI 37.60 kg/m²     Review of Systems   Physical Exam  Vitals reviewed.   Constitutional:       General: He is not in acute distress.     Appearance: Normal appearance. He is well-developed.   Eyes:      Conjunctiva/sclera: Conjunctivae normal.   Musculoskeletal:      Right lower leg: No edema.      Left lower leg: No edema.   Skin:     General: Skin is warm and dry.   Neurological:      Mental Status: He is alert and oriented to person, place, and time.   Psychiatric:         Mood and Affect: Mood and affect normal.         Behavior: Behavior normal.         Thought Content: Thought content normal.         Judgment: Judgment normal.        Result Review :   The following data was reviewed by: CONSUELO Manriquez on 09/08/2022:    Data reviewed:  note   Suture Removal    Date/Time: 9/8/2022 10:17 " AM  Performed by: Nyasia Topete APRN  Authorized by: Nyasia Topete APRN   Body area: lower extremity  Location details: left lower leg  Wound Appearance: clean  Sutures Removed: 5  Staples Removed: 0  Post-removal: Steri-Strips applied  Patient tolerance: patient tolerated the procedure well with no immediate complications  Comments: Incision healing well with well approximated borders and no surrounding erythema.  Steri-Strips applied.            Assessment and Plan    Diagnoses and all orders for this visit:    1. Laceration of left lower extremity, sequela (Primary)  -     Suture Removal    2. Encounter for removal of sutures  -     Suture Removal        Follow Up   Return if symptoms worsen or fail to improve.  Patient was given instructions and counseling regarding his condition or for health maintenance advice. Please see specific information pulled into the AVS if appropriate.

## 2022-09-18 NOTE — PROGRESS NOTES
Patient Name: Ronnie Baron   YOB: 1960  Referring Primary Care Physician: Marj Nolasco APRN  BMI: Body mass index is 37.16 kg/m².    Chief Complaint:    Chief Complaint   Patient presents with   • Left Knee - Follow-up   • Right Knee - Follow-up        HPI: She has had a 20 pound weight loss and would like to wait on shots and he is congratulated on his weight loss and will call with future knee pain    Ronnie Baron is a 61 y.o. male who presents today for evaluation of   Chief Complaint   Patient presents with   • Left Knee - Follow-up   • Right Knee - Follow-up       Subjective   Medications:   Home Medications:  Current Outpatient Medications on File Prior to Visit   Medication Sig   • albuterol sulfate  (90 Base) MCG/ACT inhaler Inhale 2 puffs Every 6 (Six) Hours As Needed for Wheezing or Shortness of Air.   • aspirin 81 MG EC tablet Take 81 mg by mouth Daily.   • benzonatate (Tessalon Perles) 100 MG capsule Take 1 capsule by mouth 3 (Three) Times a Day As Needed for Cough.   • cetirizine (zyrTEC) 10 MG tablet Take 1 tablet by mouth Daily.   • clomiPRAMINE (ANAFRANIL) 50 MG capsule    • CLOMIPRAMINE HCL PO Take 100 mg by mouth Every Night.   • clonazePAM (KlonoPIN) 0.5 MG tablet    • fluvoxaMINE (LUVOX) 100 MG tablet    • gabapentin (NEURONTIN) 600 MG tablet take 1 tablet by mouth TWICE a day   • Multiple Vitamins-Minerals (MENS MULTI VITAMIN & MINERAL PO) Take  by mouth.   • OLANZapine (zyPREXA) 7.5 MG tablet    • rosuvastatin (CRESTOR) 10 MG tablet TAKE 1 TABLET DAILY FOR    CHOLESTEROL   • Synthroid 50 MCG tablet TAKE 1 TABLET DAILY BEFORE BREAKFAST FOR THYROID (NEW DOSE)   • traZODone (DESYREL) 50 MG tablet    • Vitamin D, Cholecalciferol, 1000 UNITS tablet Take 2,000 Units by mouth daily.   • azithromycin (Zithromax Z-Cristiano) 250 MG tablet Take 2 tablets by mouth on day 1, then 1 tablet daily on days 2-5   • methylPREDNISolone (MEDROL) 4 MG dose pack Take as directed on  package instructions.     No current facility-administered medications on file prior to visit.     Current Medications:  Scheduled Meds:  Continuous Infusions:No current facility-administered medications for this visit.    PRN Meds:.    I have reviewed the patient's medical history in detail and updated the computerized patient record.  Review and summarization of old records includes:    Past Medical History:   Diagnosis Date   • Alteration in appetite    • Anxiety    • CKD (chronic kidney disease) stage 3, GFR 30-59 ml/min ([facility])    • Depression     severe recurrent major depression with psychotic features   • DVT (deep venous thrombosis) ([facility])    • Hyperglycemia    • Hyperlipidemia    • OCD (obsessive compulsive disorder)    • TALON on AUTOCPAP 2019    Moderate severity TALON with AHI 20 events per hour.  Sleep related hypoxia present.   • Renal insufficiency    • Tachycardia    • Vitamin D deficiency         Past Surgical History:   Procedure Laterality Date   • ELBOW PROCEDURE  2014        Social History     Occupational History   • Occupation:      Comment: [employer]   Tobacco Use   • Smoking status: Former Smoker     Packs/day: 1.00     Types: Cigarettes     Quit date:      Years since quittin.3   • Smokeless tobacco: Former User     Quit date: 1990   Substance and Sexual Activity   • Alcohol use: Yes     Comment: rare occasional just pleasure  none since 2016   • Drug use: No   • Sexual activity: Not Currently     Partners: Female     Birth control/protection: Other      Social History     Social History Narrative   • Not on file        Family History   Problem Relation Age of Onset   • Hypertension Mother    • Stroke Mother         2016 with two blood clots   • Depression Mother         takes welbutrin   • Hypertension Father    • Depression Father         takes medicine care give of wife   • Heart disease Maternal Grandfather    • Cancer  "Maternal Grandmother        ROS: 14 point review of systems was performed and all other systems were reviewed and are negative except for documented findings in HPI and today's encounter.     Allergies: No Known Allergies  Constitutional:  Denies fever, shaking or chills   Eyes:  Denies change in visual acuity   HENT:  Denies nasal congestion or sore throat   Respiratory:  Denies cough or shortness of breath   Cardiovascular:  Denies chest pain or severe LE edema   GI:  Denies abdominal pain, nausea, vomiting, bloody stools or diarrhea   Musculoskeletal:  Numbness, tingling, pain, or loss of motor function only as noted above in history of present illness.  : Denies painful urination or hematuria  Integument:  Denies rash, lesion or ulceration   Neurologic:  Denies headache or focal weakness  Endocrine:  Denies lymphadenopathy  Psych:  Denies confusion or change in mental status   Hem:  Denies active bleeding    OBJECTIVE:  Physical Exam: 61 y.o. male  Wt Readings from Last 3 Encounters:   04/13/22 117 kg (259 lb)   10/26/21 122 kg (270 lb)   07/23/21 122 kg (270 lb)     Ht Readings from Last 1 Encounters:   04/13/22 177.8 cm (70\")     Body mass index is 37.16 kg/m².  Vitals:    04/13/22 1407   Resp: 18   Temp: 98 °F (36.7 °C)     Vital signs reviewed.     General Appearance:    Alert, cooperative, in no acute distress                  Eyes: conjunctiva clear  ENT: external ears and nose atraumatic  CV: no peripheral edema  Resp: normal respiratory effort  Skin: no rashes or wounds; normal turgor  Psych: mood and affect appropriate  Lymph: no nodes appreciated  Neuro: gross sensation intact  Vascular:  Palpable peripheral pulse in noted extremity  Musculoskeletal Extremities: Skin warm dry and clear medial joint line effusion  Radiology:   Reviewed from 10/26/2021 tricompartmental osteoarthritis  Assessment:     ICD-10-CM ICD-9-CM   1. Primary osteoarthritis of both knees  M17.0 715.16      " "  Procedures      MDM/Plan:   The diagnosis(es), natural history, pathophysiology and treatment for diagnosis(es) were discussed. Opportunity given and questions answered.  Biomechanics of pertinent body areas discussed.  When appropriate, the use of ambulatory aids discussed.  BMI:  The concept of BMI body mass index and its importance and implications discussed.    EXERCISES:  Advice on benefits of, and types of regular/moderate exercise pertaining to orthopedic diagnosis(es).  Inflammation/pain control; with cold, heat, elevation and/or liniments discussed as appropriate      4/14/2022    Much of this encounter note is an electronic transcription/translation of spoken language to printed text. The electronic translation of spoken language may permit erroneous, or at times, nonsensical words or phrases to be inadvertently transcribed; Although I have reviewed the note for such errors, some may still exist  Pt had appointment scheduled for cortisone injections to his right knee he is a 20 pound weight loss and he would \"like to wait on shots\" left x-rays were done 10/26/2021 reviewed with patient congratulated him with his weight loss and he can call when he decides if he needs cortisone injections  " [phone]

## 2022-09-20 ENCOUNTER — OFFICE VISIT (OUTPATIENT)
Dept: FAMILY MEDICINE CLINIC | Facility: CLINIC | Age: 62
End: 2022-09-20

## 2022-09-20 VITALS
HEART RATE: 100 BPM | BODY MASS INDEX: 37.24 KG/M2 | WEIGHT: 266 LBS | DIASTOLIC BLOOD PRESSURE: 70 MMHG | TEMPERATURE: 97.1 F | OXYGEN SATURATION: 92 % | SYSTOLIC BLOOD PRESSURE: 132 MMHG | HEIGHT: 71 IN

## 2022-09-20 DIAGNOSIS — R97.20 ELEVATED PSA MEASUREMENT: ICD-10-CM

## 2022-09-20 DIAGNOSIS — R39.198 DIFFICULTY URINATING: Primary | ICD-10-CM

## 2022-09-20 DIAGNOSIS — R73.03 PREDIABETES: ICD-10-CM

## 2022-09-20 DIAGNOSIS — R79.89 ELEVATED LFTS: ICD-10-CM

## 2022-09-20 LAB
ALBUMIN SERPL-MCNC: 4.3 G/DL (ref 3.5–5.2)
ALP SERPL-CCNC: 127 U/L (ref 39–117)
ALT SERPL W P-5'-P-CCNC: 35 U/L (ref 1–41)
AST SERPL-CCNC: 19 U/L (ref 1–40)
BILIRUB CONJ SERPL-MCNC: <0.2 MG/DL (ref 0–0.3)
BILIRUB INDIRECT SERPL-MCNC: ABNORMAL MG/DL
BILIRUB SERPL-MCNC: 0.3 MG/DL (ref 0–1.2)
CERULOPLASMIN SERPL-MCNC: 25 MG/DL (ref 16–31)
DSDNA IGG SERPL IA-ACNC: NEGATIVE [IU]/ML
FERRITIN SERPL-MCNC: 124 NG/ML (ref 30–400)
HAV IGM SERPL QL IA: NORMAL
HBV CORE IGM SERPL QL IA: NORMAL
HBV SURFACE AG SERPL QL IA: NORMAL
HCV AB SER DONR QL: NORMAL
IRON 24H UR-MRATE: 72 MCG/DL (ref 59–158)
IRON SATN MFR SERPL: 16 % (ref 20–50)
NUCLEAR IGG SER IA-RTO: NEGATIVE
PROT SERPL-MCNC: 7.1 G/DL (ref 6–8.5)
TIBC SERPL-MCNC: 444 MCG/DL (ref 298–536)
TRANSFERRIN SERPL-MCNC: 298 MG/DL (ref 200–360)

## 2022-09-20 PROCEDURE — 86381 MITOCHONDRIAL ANTIBODY EACH: CPT | Performed by: NURSE PRACTITIONER

## 2022-09-20 PROCEDURE — 36415 COLL VENOUS BLD VENIPUNCTURE: CPT | Performed by: NURSE PRACTITIONER

## 2022-09-20 PROCEDURE — 99214 OFFICE O/P EST MOD 30 MIN: CPT | Performed by: NURSE PRACTITIONER

## 2022-09-20 PROCEDURE — 82103 ALPHA-1-ANTITRYPSIN TOTAL: CPT | Performed by: NURSE PRACTITIONER

## 2022-09-20 PROCEDURE — 82390 ASSAY OF CERULOPLASMIN: CPT | Performed by: NURSE PRACTITIONER

## 2022-09-20 PROCEDURE — 86225 DNA ANTIBODY NATIVE: CPT | Performed by: NURSE PRACTITIONER

## 2022-09-20 PROCEDURE — 86015 ACTIN ANTIBODY EACH: CPT | Performed by: NURSE PRACTITIONER

## 2022-09-20 PROCEDURE — 80076 HEPATIC FUNCTION PANEL: CPT | Performed by: NURSE PRACTITIONER

## 2022-09-20 PROCEDURE — 80074 ACUTE HEPATITIS PANEL: CPT | Performed by: NURSE PRACTITIONER

## 2022-09-20 PROCEDURE — 86038 ANTINUCLEAR ANTIBODIES: CPT | Performed by: NURSE PRACTITIONER

## 2022-09-20 PROCEDURE — 82728 ASSAY OF FERRITIN: CPT | Performed by: NURSE PRACTITIONER

## 2022-09-20 PROCEDURE — 84466 ASSAY OF TRANSFERRIN: CPT | Performed by: NURSE PRACTITIONER

## 2022-09-20 PROCEDURE — 82104 ALPHA-1-ANTITRYPSIN PHENO: CPT | Performed by: NURSE PRACTITIONER

## 2022-09-20 PROCEDURE — 83540 ASSAY OF IRON: CPT | Performed by: NURSE PRACTITIONER

## 2022-09-20 NOTE — PROGRESS NOTES
Chief Complaint  Follow-up (On lab results)    Subjective            Ronnie Baron presents to [facility]  History of Present Illness  Pt is here for follow-up on the abnormal labs recently done    -- A1c 6% prediabetic    -- Elevated PSA level first time ever--does admit to sometimes having to void 2-3 times before his bladder is empty-needs referral    -- Elevated liver function tests last abdominal ultrasound 5 years ago showed possibly consistent with fatty liver needs work-up      PHQ-2 Total Score: 0  PHQ-9 Total Score: 0    Past Medical History:   Diagnosis Date   • Alteration in appetite    • Anxiety    • CKD (chronic kidney disease) stage 3, GFR 30-59 ml/min ([facility])    • Depression     severe recurrent major depression with psychotic features   • DVT (deep venous thrombosis) ([facility])    • Hyperglycemia    • Hyperlipidemia    • OCD (obsessive compulsive disorder)    • TALON on AUTOCPAP 2019    Moderate severity TALON with AHI 20 events per hour.  Sleep related hypoxia present.   • Renal insufficiency    • Tachycardia    • Vitamin D deficiency        No Known Allergies     Past Surgical History:   Procedure Laterality Date   • ELBOW PROCEDURE  2014        Social History     Tobacco Use   • Smoking status: Former Smoker     Packs/day: 1.00     Types: Cigarettes     Quit date:      Years since quittin.7   • Smokeless tobacco: Former User     Quit date: 1990   Vaping Use   • Vaping Use: Never used   Substance Use Topics   • Alcohol use: Yes     Comment: rare occasional just pleasure  none since 2016   • Drug use: No       Family History   Problem Relation Age of Onset   • Hypertension Mother    • Stroke Mother         2016 with two blood clots   • Depression Mother         takes welbutrin   • Hypertension Father    • Depression Father         takes medicine care give of wife   • Heart disease Maternal Grandfather    • Cancer Maternal Grandmother      "    Health Maintenance Due   Topic Date Due   • COVID-19 Vaccine (1) Never done   • ANNUAL PHYSICAL  Never done        Current Outpatient Medications on File Prior to Visit   Medication Sig   • albuterol sulfate  (90 Base) MCG/ACT inhaler Inhale 2 puffs Every 6 (Six) Hours As Needed for Wheezing or Shortness of Air.   • aspirin 81 MG EC tablet Take 81 mg by mouth Daily.   • cetirizine (zyrTEC) 10 MG tablet Take 1 tablet by mouth Daily.   • clomiPRAMINE (ANAFRANIL) 50 MG capsule    • clonazePAM (KlonoPIN) 0.5 MG tablet    • fluvoxaMINE (LUVOX) 100 MG tablet    • gabapentin (NEURONTIN) 600 MG tablet take 1 tablet by mouth TWICE a day   • levothyroxine (Synthroid) 50 MCG tablet Take 1 tablet by mouth Every Morning.   • Multiple Vitamins-Minerals (MENS MULTI VITAMIN & MINERAL PO) Take  by mouth.   • OLANZapine (zyPREXA) 5 MG tablet    • OLANZapine (zyPREXA) 7.5 MG tablet    • rosuvastatin (CRESTOR) 10 MG tablet Take 1 tablet by mouth Daily. for cholesterol   • traZODone (DESYREL) 50 MG tablet    • Vitamin D, Cholecalciferol, 1000 UNITS tablet Take 2,000 Units by mouth daily.     No current facility-administered medications on file prior to visit.       Immunization History   Administered Date(s) Administered   • Shingrix 07/17/2019, 10/23/2019   • Td 07/06/1998   • Tdap 07/01/2016       Review of Systems   Constitutional: Negative for fatigue.   Respiratory: Negative for shortness of breath.    Cardiovascular: Negative for chest pain.   Gastrointestinal: Negative for abdominal pain.   Endocrine: Negative for polydipsia, polyphagia and polyuria.   Genitourinary: Positive for difficulty urinating. Negative for decreased urine volume and nocturia.   Neurological: Negative for dizziness and light-headedness.        Objective     /70 (BP Location: Left arm, Patient Position: Sitting, Cuff Size: Adult)   Pulse 100   Temp 97.1 °F (36.2 °C) (Temporal)   Ht 180.3 cm (71\")   Wt 121 kg (266 lb)   SpO2 92%   BMI " 37.10 kg/m²       Physical Exam  Vitals and nursing note reviewed.   Constitutional:       Appearance: Normal appearance. He is obese.      Comments: Lost 5#   HENT:      Head: Normocephalic.      Right Ear: External ear normal.      Left Ear: External ear normal.      Nose: Nose normal.      Mouth/Throat:      Comments: Wearing mask  Eyes:      Pupils: Pupils are equal, round, and reactive to light.   Cardiovascular:      Rate and Rhythm: Normal rate and regular rhythm.      Heart sounds: Normal heart sounds.   Pulmonary:      Effort: Pulmonary effort is normal.      Breath sounds: Normal breath sounds.   Abdominal:      General: Bowel sounds are normal.      Palpations: Abdomen is soft.   Musculoskeletal:         General: Normal range of motion.      Cervical back: Normal range of motion and neck supple.      Comments: Pt reports small swelled area to the other side of the left knee--below and opposite of the place where he just had a laceration repair    Skin:     General: Skin is warm and dry.   Neurological:      Mental Status: He is alert and oriented to person, place, and time.   Psychiatric:         Mood and Affect: Mood normal.         Behavior: Behavior normal.         Thought Content: Thought content normal.         Judgment: Judgment normal.         Result Review :           US Abdomen Complete (04/25/2017 07:32)  Urinalysis With Microscopic If Indicated (No Culture) - Urine, Clean Catch (09/07/2022 10:30)  CBC & Differential (09/07/2022 10:27)  PSA Screen (09/07/2022 10:27)  Vitamin D 25 Hydroxy (09/07/2022 10:27)  Hemoglobin A1c (09/07/2022 10:27)  TSH+Free T4 (09/07/2022 10:27)  Comprehensive Metabolic Panel (09/07/2022 10:27)  Lipid Panel With / Chol / HDL Ratio (09/07/2022 10:27)  NEPHROLOGY - SCAN - CKD3 F/U, [facility], 03/07/2022 (03/07/2022)    UC with Luzma Murry MD (08/27/2022)               Assessment and Plan      Diagnoses and all orders for this visit:    1. Difficulty urinating  (Primary)  Comments:  hesitancy   Orders:  -     Ambulatory Referral to Urology    2. Elevated PSA measurement  -     Ambulatory Referral to Urology    3. Elevated LFTs  -     Ferritin  -     Ceruloplasmin  -     US Liver; Future  -     Hepatitis Panel, Acute  -     Alpha - 1 - Antitrypsin Phenotype  -     Iron Profile  -     Anti-Smooth Muscle Antibody Titer  -     ANALI  -     Mitochondrial Antibodies, M2  -     Hepatic Function Panel    4. Prediabetes  Comments:  counseled diet and exercise     pt will let me know if persists and we will order the xray of the Tib/Fib         Follow Up     Return if symptoms worsen or fail to improve.

## 2022-09-20 NOTE — PROGRESS NOTES
Venipuncture Blood Specimen Collection  Venipuncture performed in lefft arm  by Azul Hackett with good hemostasis. Patient tolerated the procedure well without complications.   09/20/22   Azul Hackett

## 2022-09-21 NOTE — PROGRESS NOTES
Please mail letter to patient stating    Ronnie, hepatitis panel was negative and ferritin levels were normal; ceruloplasmin was normal; total iron and transferrin and total iron-binding capacity normal; hepatic function panel was all normal with the exception of the persistent elevated alkaline phosphatase; ANALI was negative; and the others labs are still pending

## 2022-09-22 LAB
MITOCHONDRIA M2 IGG SER-ACNC: <20 UNITS (ref 0–20)
SMA IGG SER-ACNC: 4 UNITS (ref 0–19)

## 2022-09-23 LAB
A1AT PHENOTYP SERPL IFE: NORMAL
A1AT SERPL-MCNC: 149 MG/DL (ref 101–187)

## 2022-09-23 NOTE — PROGRESS NOTES
Please mail letter to pt stating:    Ronnie, the smooth muscle antibody titer was normal; the mitochondrial antibodies was negative; the other labs are pending

## 2022-09-26 ENCOUNTER — OFFICE VISIT (OUTPATIENT)
Dept: UROLOGY | Facility: CLINIC | Age: 62
End: 2022-09-26

## 2022-09-26 VITALS
SYSTOLIC BLOOD PRESSURE: 115 MMHG | WEIGHT: 270 LBS | DIASTOLIC BLOOD PRESSURE: 73 MMHG | HEART RATE: 103 BPM | TEMPERATURE: 98.6 F | BODY MASS INDEX: 37.8 KG/M2 | HEIGHT: 71 IN

## 2022-09-26 DIAGNOSIS — N40.0 ENLARGED PROSTATE ON RECTAL EXAMINATION: ICD-10-CM

## 2022-09-26 DIAGNOSIS — N32.0 BLADDER OUTLET OBSTRUCTION: ICD-10-CM

## 2022-09-26 DIAGNOSIS — R97.20 ELEVATED PROSTATE SPECIFIC ANTIGEN (PSA): Primary | ICD-10-CM

## 2022-09-26 LAB
BILIRUB BLD-MCNC: NEGATIVE MG/DL
CLARITY, POC: CLEAR
COLOR UR: YELLOW
EXPIRATION DATE: NORMAL
GLUCOSE UR STRIP-MCNC: NEGATIVE MG/DL
KETONES UR QL: NEGATIVE
LEUKOCYTE EST, POC: NEGATIVE
Lab: NORMAL
NITRITE UR-MCNC: NEGATIVE MG/ML
PH UR: 5.5 [PH] (ref 5–8)
PROT UR STRIP-MCNC: NEGATIVE MG/DL
RBC # UR STRIP: NEGATIVE /UL
SP GR UR: 1.03 (ref 1–1.03)
UROBILINOGEN UR QL: NORMAL

## 2022-09-26 PROCEDURE — 99213 OFFICE O/P EST LOW 20 MIN: CPT | Performed by: NURSE PRACTITIONER

## 2022-09-26 PROCEDURE — 81003 URINALYSIS AUTO W/O SCOPE: CPT | Performed by: NURSE PRACTITIONER

## 2022-09-26 RX ORDER — SULFAMETHOXAZOLE AND TRIMETHOPRIM 800; 160 MG/1; MG/1
1 TABLET ORAL 2 TIMES DAILY
Qty: 28 TABLET | Refills: 0 | Status: SHIPPED | OUTPATIENT
Start: 2022-09-26 | End: 2022-10-10

## 2022-09-26 RX ORDER — TAMSULOSIN HYDROCHLORIDE 0.4 MG/1
1 CAPSULE ORAL DAILY
Qty: 30 CAPSULE | Refills: 6 | Status: SHIPPED | OUTPATIENT
Start: 2022-09-26

## 2022-09-26 NOTE — PROGRESS NOTES
"Chief Complaint  Elevated PSA and Difficulty Urinating    Subjective          Ronnie aBron 62 y.o. male presents to [facility]  History of Present Illness  Referral per CONSUELO Bruce for Elevated PSA of 4.5 on 9-7-22 and mild voiding difficulty.  Reports urinary hesitancy and slow stream noticed for the  past 6 months. Denies urinary frequency, urgency, dysuria or gross hematuria. No history of prostate cancer personal or in his family. No previous DANGELO. Denies history of recurrent uti or kidney stones. He has not been prescribed any medication for is prostate. Last antibiotic keflex prescribed approximately 1 month ago for leg laceration.     Review of Systems   Constitutional: Negative for chills and fever.      Objective   Vital Signs:   /73   Pulse 103   Temp 98.6 °F (37 °C)   Ht 180.3 cm (71\")   Wt 122 kg (270 lb)   BMI 37.66 kg/m²      Past Surgical History:   Procedure Laterality Date   • ELBOW PROCEDURE  06/2014        Physical Exam  Vitals and nursing note reviewed.   Constitutional:       General: He is not in acute distress.     Appearance: Normal appearance. He is well-developed. He is not ill-appearing.      Comments: Ambulates without difficulty   Cardiovascular:      Rate and Rhythm: Normal rate.   Pulmonary:      Effort: Pulmonary effort is normal.      Breath sounds: Normal air entry.   Abdominal:      Hernia: There is no hernia in the left inguinal area or right inguinal area.   Genitourinary:     Penis: Circumcised. No discharge, swelling or lesions.       Testes:         Right: Tenderness not present.         Left: Tenderness not present.      Epididymis:      Right: No tenderness.      Left: No tenderness.      Comments: Prostate approximately 35+g lobular and firm.Hardness on. No discrete nodules. Stool in rectal vault.   Musculoskeletal:         General: Normal range of motion.   Lymphadenopathy:      Lower Body: No right inguinal adenopathy. No left " inguinal adenopathy.   Skin:     General: Skin is warm and dry.   Neurological:      Mental Status: He is alert and oriented to person, place, and time.      Motor: Motor function is intact.   Psychiatric:         Mood and Affect: Mood normal.         Behavior: Behavior normal. Behavior is cooperative.         Thought Content: Thought content normal.         Judgment: Judgment normal.        Result Review :        POC Urinalysis Dipstick, Automated (09/26/2022 09:02)       Contains abnormal data PSA Screen        Component   Ref Range & Units 2 wk ago 3 yr ago 5 yr ago 6 yr ago   PSA   0.000 - 4.000 ng/mL 4.520 High   2.010  1.970  1.700    Resulting Agency [facility]                      Assessment and Plan    Diagnoses and all orders for this visit:    1. Elevated prostate specific antigen (PSA) (Primary)  -     POC Urinalysis Dipstick, Automated  -     sulfamethoxazole-trimethoprim (Bactrim DS) 800-160 MG per tablet; Take 1 tablet by mouth 2 (Two) Times a Day for 14 days.  Dispense: 28 tablet; Refill: 0  -     tamsulosin (FLOMAX) 0.4 MG capsule 24 hr capsule; Take 1 capsule by mouth Daily. In evening 30 minutes after meal  Dispense: 30 capsule; Refill: 6    2. Enlarged prostate on rectal examination  -     tamsulosin (FLOMAX) 0.4 MG capsule 24 hr capsule; Take 1 capsule by mouth Daily. In evening 30 minutes after meal  Dispense: 30 capsule; Refill: 6    3. Bladder outlet obstruction  -     tamsulosin (FLOMAX) 0.4 MG capsule 24 hr capsule; Take 1 capsule by mouth Daily. In evening 30 minutes after meal  Dispense: 30 capsule; Refill: 6    will provide bactrim ds for 2 weeks and repeat psa level in approximately 1 months. Tamsulosin in evening for voiding  Symptoms, intermittency. Follow up after psa level repeat to discuss results. Prostate is enlarged and Last previous psa level 3 years prior. Will order MRI of prostate if repeat level continues to be elevated. Prostate is enlarged and  increased firmness right side.     Follow Up   No follow-ups on file.  Patient was given instructions and counseling regarding his condition or for health maintenance advice. Please see specific information pulled into the AVS if appropriate.     Michell Brown, APRN

## 2022-09-26 NOTE — PROGRESS NOTES
Please mail letter to patient stating    Ronnie the alpha-1 antitrypsin phenotype was in normal range we are still awaiting the liver ultrasound results

## 2022-09-29 ENCOUNTER — PATIENT ROUNDING (BHMG ONLY) (OUTPATIENT)
Dept: UROLOGY | Facility: CLINIC | Age: 62
End: 2022-09-29

## 2022-09-29 NOTE — PROGRESS NOTES
A [employer] message has been sent to the patient for [place] [facility] [place]

## 2022-10-03 ENCOUNTER — HOSPITAL ENCOUNTER (OUTPATIENT)
Dept: ULTRASOUND IMAGING | Facility: HOSPITAL | Age: 62
Discharge: HOME | End: 2022-10-03
Admitting: NURSE PRACTITIONER

## 2022-10-03 DIAGNOSIS — R79.89 ELEVATED LFTS: ICD-10-CM

## 2022-10-03 PROCEDURE — 76705 ECHO EXAM OF ABDOMEN: CPT

## 2022-10-28 ENCOUNTER — OFFICE VISIT (OUTPATIENT)
Dept: UROLOGY | Facility: CLINIC | Age: 62
End: 2022-10-28

## 2022-10-28 VITALS
TEMPERATURE: 97.4 F | DIASTOLIC BLOOD PRESSURE: 74 MMHG | WEIGHT: 270 LBS | HEIGHT: 70 IN | SYSTOLIC BLOOD PRESSURE: 110 MMHG | HEART RATE: 108 BPM | BODY MASS INDEX: 38.65 KG/M2

## 2022-10-28 DIAGNOSIS — R97.20 ELEVATED PROSTATE SPECIFIC ANTIGEN (PSA): Primary | ICD-10-CM

## 2022-10-28 LAB
BILIRUB BLD-MCNC: NEGATIVE MG/DL
CLARITY, POC: CLEAR
COLOR UR: YELLOW
GLUCOSE UR STRIP-MCNC: NEGATIVE MG/DL
KETONES UR QL: NEGATIVE
LEUKOCYTE EST, POC: NEGATIVE
NITRITE UR-MCNC: NEGATIVE MG/ML
PH UR: 5.5 [PH] (ref 5–8)
PROT UR STRIP-MCNC: NEGATIVE MG/DL
RBC # UR STRIP: NEGATIVE /UL
SP GR UR: 1.03 (ref 1–1.03)
UROBILINOGEN UR QL: NORMAL

## 2022-10-28 PROCEDURE — 81002 URINALYSIS NONAUTO W/O SCOPE: CPT | Performed by: NURSE PRACTITIONER

## 2022-10-28 PROCEDURE — 84153 ASSAY OF PSA TOTAL: CPT | Performed by: NURSE PRACTITIONER

## 2022-10-28 PROCEDURE — 99212 OFFICE O/P EST SF 10 MIN: CPT | Performed by: NURSE PRACTITIONER

## 2022-10-28 NOTE — PROGRESS NOTES
"Chief Complaint  Elevated PSA    Subjective          Ronnie Baron 62 y.o.pleasant male presents to [facility]  History of Present Illness  Follow up of elevated psa. Patient misunderstood and repeat psa level was not drawn prior to visit. It was collected today. He did complete antibiotics and continues tamsulosin. Denies any significant voiding problems.     Review of Systems   Constitutional: Negative for chills and fever.      Objective   Vital Signs:   /74   Pulse 108   Temp 97.4 °F (36.3 °C)   Ht 177.8 cm (70\")   Wt 122 kg (270 lb)   BMI 38.74 kg/m²      Past Surgical History:   Procedure Laterality Date   • ELBOW PROCEDURE  06/2014        Physical Exam  Vitals and nursing note reviewed.   Constitutional:       General: He is not in acute distress.     Appearance: Normal appearance. He is well-developed. He is not ill-appearing.      Comments: Ambulates without difficulty   Cardiovascular:      Rate and Rhythm: Normal rate.   Pulmonary:      Effort: Pulmonary effort is normal.      Breath sounds: Normal air entry.   Musculoskeletal:         General: Normal range of motion.   Skin:     General: Skin is warm and dry.   Neurological:      Mental Status: He is alert and oriented to person, place, and time.      Motor: Motor function is intact.   Psychiatric:         Mood and Affect: Mood normal.         Behavior: Behavior normal. Behavior is cooperative.         Thought Content: Thought content normal.         Judgment: Judgment normal.        Result Review :        POC Urinalysis Dipstick (10/28/2022 09:02)              Assessment and Plan    Diagnoses and all orders for this visit:    1. Elevated prostate specific antigen (PSA) (Primary)  -     POC Urinalysis Dipstick  -     PSA Diagnostic    repeat psa level collected today in office. Continue tamsulosin. Will notify patient of results next week. Patient does have my chart. I explained that if psa comes down below 4.0, will " follow and monitor psa level. Probable prostatitis and psa should improve with antibiotic. At times 2 rounds required. If returned into normal range, have patient follow up in 6 months.     Follow Up   No follow-ups on file. will schedule once psa level resulted. Routine follow ups every 6 months  Patient was given instructions and counseling regarding his condition or for health maintenance advice. Please see specific information pulled into the AVS if appropriate.     Michell Brown, APRN

## 2022-10-29 LAB — PSA SERPL-MCNC: 3.58 NG/ML (ref 0–4)

## 2022-11-10 DIAGNOSIS — E78.2 MIXED HYPERLIPIDEMIA: Chronic | ICD-10-CM

## 2022-11-10 DIAGNOSIS — E03.9 HYPOTHYROIDISM, ACQUIRED: ICD-10-CM

## 2022-11-11 RX ORDER — ROSUVASTATIN CALCIUM 10 MG/1
TABLET, COATED ORAL
Qty: 90 TABLET | Refills: 0 | Status: SHIPPED | OUTPATIENT
Start: 2022-11-11 | End: 2023-02-09

## 2022-11-11 RX ORDER — LEVOTHYROXINE SODIUM 50 MCG
TABLET ORAL
Qty: 90 TABLET | Refills: 0 | Status: SHIPPED | OUTPATIENT
Start: 2022-11-11 | End: 2023-02-09

## 2023-02-08 DIAGNOSIS — E78.2 MIXED HYPERLIPIDEMIA: Chronic | ICD-10-CM

## 2023-02-08 DIAGNOSIS — E03.9 HYPOTHYROIDISM, ACQUIRED: ICD-10-CM

## 2023-02-09 RX ORDER — LEVOTHYROXINE SODIUM 50 MCG
TABLET ORAL
Qty: 90 TABLET | Refills: 0 | Status: SHIPPED | OUTPATIENT
Start: 2023-02-09

## 2023-02-09 RX ORDER — ROSUVASTATIN CALCIUM 10 MG/1
TABLET, COATED ORAL
Qty: 90 TABLET | Refills: 0 | Status: SHIPPED | OUTPATIENT
Start: 2023-02-09

## 2023-04-18 DIAGNOSIS — N40.0 ENLARGED PROSTATE ON RECTAL EXAMINATION: ICD-10-CM

## 2023-04-18 DIAGNOSIS — R97.20 ELEVATED PROSTATE SPECIFIC ANTIGEN (PSA): ICD-10-CM

## 2023-04-18 DIAGNOSIS — N32.0 BLADDER OUTLET OBSTRUCTION: ICD-10-CM

## 2023-04-18 RX ORDER — TAMSULOSIN HYDROCHLORIDE 0.4 MG/1
CAPSULE ORAL
Qty: 30 CAPSULE | Refills: 6 | Status: SHIPPED | OUTPATIENT
Start: 2023-04-18

## 2023-04-27 DIAGNOSIS — E78.2 MIXED HYPERLIPIDEMIA: Chronic | ICD-10-CM

## 2023-04-27 DIAGNOSIS — E03.9 HYPOTHYROIDISM, ACQUIRED: ICD-10-CM

## 2023-04-28 RX ORDER — LEVOTHYROXINE SODIUM 50 MCG
TABLET ORAL
Qty: 90 TABLET | Refills: 0 | OUTPATIENT
Start: 2023-04-28

## 2023-04-28 RX ORDER — ROSUVASTATIN CALCIUM 10 MG/1
TABLET, COATED ORAL
Qty: 90 TABLET | Refills: 0 | OUTPATIENT
Start: 2023-04-28

## 2023-05-01 ENCOUNTER — OFFICE VISIT (OUTPATIENT)
Dept: UROLOGY | Facility: CLINIC | Age: 63
End: 2023-05-01
Payer: COMMERCIAL

## 2023-05-01 VITALS
WEIGHT: 288 LBS | BODY MASS INDEX: 41.23 KG/M2 | TEMPERATURE: 98 F | DIASTOLIC BLOOD PRESSURE: 84 MMHG | HEART RATE: 117 BPM | SYSTOLIC BLOOD PRESSURE: 146 MMHG | HEIGHT: 70 IN

## 2023-05-01 DIAGNOSIS — Z87.898 HISTORY OF ELEVATED PROSTATE SPECIFIC ANTIGEN (PSA): ICD-10-CM

## 2023-05-01 DIAGNOSIS — N13.8 BPH WITH URINARY OBSTRUCTION: Primary | ICD-10-CM

## 2023-05-01 DIAGNOSIS — N40.1 BPH WITH URINARY OBSTRUCTION: Primary | ICD-10-CM

## 2023-05-01 DIAGNOSIS — Z87.438 HISTORY OF PROSTATITIS: ICD-10-CM

## 2023-05-01 LAB
BILIRUB BLD-MCNC: NEGATIVE MG/DL
CLARITY, POC: CLEAR
COLOR UR: YELLOW
GLUCOSE UR STRIP-MCNC: NEGATIVE MG/DL
KETONES UR QL: NEGATIVE
LEUKOCYTE EST, POC: NEGATIVE
NITRITE UR-MCNC: NEGATIVE MG/ML
PH UR: 5.5 [PH] (ref 5–8)
PROT UR STRIP-MCNC: NEGATIVE MG/DL
RBC # UR STRIP: ABNORMAL /UL
SP GR UR: 1.03 (ref 1–1.03)
UROBILINOGEN UR QL: ABNORMAL

## 2023-05-01 PROCEDURE — 99212 OFFICE O/P EST SF 10 MIN: CPT | Performed by: NURSE PRACTITIONER

## 2023-05-01 PROCEDURE — 81002 URINALYSIS NONAUTO W/O SCOPE: CPT | Performed by: NURSE PRACTITIONER

## 2023-05-01 RX ORDER — LORAZEPAM 0.5 MG/1
TABLET ORAL
COMMUNITY
Start: 2023-04-27

## 2023-05-01 NOTE — PROGRESS NOTES
"Chief Complaint  Benign Prostatic Hypertrophy (PSA 3.5 on 10/28/22)    Subjective          Ronnie Baron is a 62 y.o. male who presents to [facility] UROLOGY  History of Present Illness  Follow up of bph. Last psa level return to within normal range 3.5 on 10/28/2023. Treated for prostatitis and continues to take tamsulosin. Voids approximately  3 times during the day and nocturia 0-1.  Urine stream adequate, may have a little post void dribble which is not bothersome. Not wishing to consider try additional treatment options at this time. Denies dysuria or gross hematuria.      Objective   Vital Signs:   /84   Pulse 117   Temp 98 °F (36.7 °C) (Infrared)   Ht 177.8 cm (70\")   Wt 131 kg (288 lb)   BMI 41.32 kg/m²     No Known Allergies   Past medical history:  has a past medical history of Alteration in appetite, Anxiety, CKD (chronic kidney disease) stage 3, GFR 30-59 ml/min, Depression, DVT (deep venous thrombosis), Elevated PSA (09/22/22), Hyperglycemia, Hyperlipidemia, OCD (obsessive compulsive disorder), TALON on AUTOCPAP (02/26/2019), Renal insufficiency, Tachycardia, and Vitamin D deficiency.   Past surgical history:  has a past surgical history that includes Elbow surgery (06/2014).  Personal history: family history includes Cancer in his maternal grandmother; Depression in his father and mother; Heart disease in his maternal grandfather; Hypertension in his father and mother; Kidney disease in his mother; Stroke in his mother.  Social history:  reports that he quit smoking about 31 years ago. His smoking use included cigarettes. He smoked an average of 1 pack per day. He quit smokeless tobacco use about 32 years ago. He reports current alcohol use. He reports that he does not use drugs.    Review of Systems   Constitutional: Negative for chills and fever.   Genitourinary: Negative for dysuria, frequency and hematuria.        Physical Exam  Vitals and nursing note reviewed. "   Constitutional:       General: He is not in acute distress.     Appearance: Normal appearance. He is well-developed. He is not ill-appearing.      Comments: Ambulates without difficulty   Cardiovascular:      Rate and Rhythm: Regular rhythm. Tachycardia present.   Pulmonary:      Effort: Pulmonary effort is normal.      Breath sounds: Normal air entry.   Musculoskeletal:         General: Normal range of motion.   Skin:     General: Skin is warm and dry.   Neurological:      Mental Status: He is alert and oriented to person, place, and time.      Motor: Motor function is intact.   Psychiatric:         Mood and Affect: Mood normal.         Behavior: Behavior normal. Behavior is cooperative.         Thought Content: Thought content normal.         Judgment: Judgment normal.        Result Review :   The following data was reviewed by: CONSUELO Alvarado on 05/01/2023:  CMP        9/7/2022    10:27 9/20/2022    16:48   CMP   Glucose 101      BUN 15      Creatinine 1.58      EGFR 49.5      Sodium 140      Potassium 4.7      Chloride 104      Calcium 9.4      Total Protein 6.8   7.1     Albumin 4.30   4.30     Globulin 2.5      Total Bilirubin 0.2   0.3     Alkaline Phosphatase 127   127     AST (SGOT) 33   19     ALT (SGPT) 43   35     Albumin/Globulin Ratio 1.7      BUN/Creatinine Ratio 9.5      Anion Gap 9.0        CBC        9/7/2022    10:27   CBC   WBC 4.63     RBC 5.03     Hemoglobin 15.1     Hematocrit 44.1     MCV 87.7     MCH 30.0     MCHC 34.2     RDW 13.0     Platelets 200       PSA   Date Value Ref Range Status   10/28/2022 3.580 0.000 - 4.000 ng/mL Final   09/07/2022 4.520 (H) 0.000 - 4.000 ng/mL Final   01/16/2019 2.010 0.000 - 4.000 ng/mL Final        Results for orders placed or performed in visit on 05/01/23   POC Urinalysis Dipstick    Specimen: Urine   Result Value Ref Range    Color Yellow Yellow, Straw, Dark Yellow, Candace    Clarity, UA Clear Clear    Glucose, UA Negative Negative mg/dL     Bilirubin Negative Negative    Ketones, UA Negative Negative    Specific Gravity  1.030 1.005 - 1.030    Blood, UA Trace (A) Negative    pH, Urine 5.5 5.0 - 8.0    Protein, POC Negative Negative mg/dL    Urobilinogen, UA 0.2 E.U./dL Normal, 0.2 E.U./dL    Leukocytes Negative Negative    Nitrite, UA Negative Negative            Assessment and Plan    Diagnoses and all orders for this visit:    1. BPH with urinary obstruction (Primary)  -     POC Urinalysis Dipstick    2. History of elevated prostate specific antigen (PSA)    3. History of prostatitis       Continue tamsulosin. Elevated psa resolved after treatment of prostatitis. Patient satisfied with current voiding pattern. Will have follow up in November. May draw annual psa level at that visit. Patient to call sooner if increased voiding difficulty.    Follow Up   Return in about 6 months (around 11/1/2023) for bph history of elevated psa will collect annual psa at next visit.  Patient was given instructions and counseling regarding his condition or for health maintenance advice. Please see specific information pulled into the AVS if appropriate.     Michell Brown, APRN

## 2023-06-13 ENCOUNTER — TRANSCRIBE ORDERS (OUTPATIENT)
Dept: ADMINISTRATIVE | Facility: HOSPITAL | Age: 63
End: 2023-06-13
Payer: COMMERCIAL

## 2023-06-13 ENCOUNTER — LAB (OUTPATIENT)
Dept: LAB | Facility: HOSPITAL | Age: 63
End: 2023-06-13
Payer: COMMERCIAL

## 2023-06-13 DIAGNOSIS — N18.30 STAGE 3 CHRONIC KIDNEY DISEASE, UNSPECIFIED WHETHER STAGE 3A OR 3B CKD: ICD-10-CM

## 2023-06-13 DIAGNOSIS — N18.30 STAGE 3 CHRONIC KIDNEY DISEASE, UNSPECIFIED WHETHER STAGE 3A OR 3B CKD: Primary | ICD-10-CM

## 2023-06-13 LAB
ANION GAP SERPL CALCULATED.3IONS-SCNC: 6 MMOL/L (ref 5–15)
BACTERIA UR QL AUTO: NORMAL /HPF
BILIRUB UR QL STRIP: NEGATIVE
BUN SERPL-MCNC: 23 MG/DL (ref 8–23)
BUN/CREAT SERPL: 13.7 (ref 7–25)
CALCIUM SPEC-SCNC: 9.1 MG/DL (ref 8.6–10.5)
CHLORIDE SERPL-SCNC: 102 MMOL/L (ref 98–107)
CLARITY UR: CLEAR
CO2 SERPL-SCNC: 28 MMOL/L (ref 22–29)
COLOR UR: YELLOW
CREAT SERPL-MCNC: 1.68 MG/DL (ref 0.76–1.27)
EGFRCR SERPLBLD CKD-EPI 2021: 45.9 ML/MIN/1.73
GLUCOSE SERPL-MCNC: 166 MG/DL (ref 65–99)
GLUCOSE UR STRIP-MCNC: NEGATIVE MG/DL
HGB UR QL STRIP.AUTO: NEGATIVE
HYALINE CASTS UR QL AUTO: NORMAL /LPF
KETONES UR QL STRIP: NEGATIVE
LEUKOCYTE ESTERASE UR QL STRIP.AUTO: NEGATIVE
NITRITE UR QL STRIP: NEGATIVE
PH UR STRIP.AUTO: 6 [PH] (ref 5–8)
POTASSIUM SERPL-SCNC: 4.6 MMOL/L (ref 3.5–5.2)
PROT UR QL STRIP: NEGATIVE
RBC # UR STRIP: NORMAL /HPF
REF LAB TEST METHOD: NORMAL
SODIUM SERPL-SCNC: 136 MMOL/L (ref 136–145)
SP GR UR STRIP: 1.01 (ref 1–1.03)
SQUAMOUS #/AREA URNS HPF: NORMAL /HPF
UROBILINOGEN UR QL STRIP: NORMAL
WBC # UR STRIP: NORMAL /HPF

## 2023-06-13 PROCEDURE — 80048 BASIC METABOLIC PNL TOTAL CA: CPT

## 2023-06-13 PROCEDURE — 36415 COLL VENOUS BLD VENIPUNCTURE: CPT

## 2023-06-13 PROCEDURE — 85025 COMPLETE CBC W/AUTO DIFF WBC: CPT

## 2023-06-13 PROCEDURE — 81001 URINALYSIS AUTO W/SCOPE: CPT

## 2023-06-14 LAB
BASOPHILS # BLD AUTO: 0.04 10*3/MM3 (ref 0–0.2)
BASOPHILS NFR BLD AUTO: 0.8 % (ref 0–1.5)
DEPRECATED RDW RBC AUTO: 43.4 FL (ref 37–54)
EOSINOPHIL # BLD AUTO: 0.05 10*3/MM3 (ref 0–0.4)
EOSINOPHIL NFR BLD AUTO: 1 % (ref 0.3–6.2)
ERYTHROCYTE [DISTWIDTH] IN BLOOD BY AUTOMATED COUNT: 13.1 % (ref 12.3–15.4)
HCT VFR BLD AUTO: 45.4 % (ref 37.5–51)
HGB BLD-MCNC: 15.4 G/DL (ref 13–17.7)
IMM GRANULOCYTES # BLD AUTO: 0.02 10*3/MM3 (ref 0–0.05)
IMM GRANULOCYTES NFR BLD AUTO: 0.4 % (ref 0–0.5)
LYMPHOCYTES # BLD AUTO: 1.27 10*3/MM3 (ref 0.7–3.1)
LYMPHOCYTES NFR BLD AUTO: 25.3 % (ref 19.6–45.3)
MCH RBC QN AUTO: 30.6 PG (ref 26.6–33)
MCHC RBC AUTO-ENTMCNC: 33.9 G/DL (ref 31.5–35.7)
MCV RBC AUTO: 90.3 FL (ref 79–97)
MONOCYTES # BLD AUTO: 0.21 10*3/MM3 (ref 0.1–0.9)
MONOCYTES NFR BLD AUTO: 4.2 % (ref 5–12)
NEUTROPHILS NFR BLD AUTO: 3.43 10*3/MM3 (ref 1.7–7)
NEUTROPHILS NFR BLD AUTO: 68.3 % (ref 42.7–76)
NRBC BLD AUTO-RTO: 0 /100 WBC (ref 0–0.2)
PLATELET # BLD AUTO: 186 10*3/MM3 (ref 140–450)
PMV BLD AUTO: 10.9 FL (ref 6–12)
RBC # BLD AUTO: 5.03 10*6/MM3 (ref 4.14–5.8)
WBC NRBC COR # BLD: 5.02 10*3/MM3 (ref 3.4–10.8)

## 2023-06-19 ENCOUNTER — TELEPHONE (OUTPATIENT)
Dept: FAMILY MEDICINE CLINIC | Facility: CLINIC | Age: 63
End: 2023-06-19
Payer: COMMERCIAL

## 2023-06-19 NOTE — TELEPHONE ENCOUNTER
Caller: Arielle Baron    Relationship to patient: Emergency Contact    Best call back number: [phone]    Chief complaint: MEDICATION REFILLS    Type of visit: OFFICE    Requested date: ASAP     Additional notes:THE PATIENT WOULD LIKE A CALL BACK TO SCHEDULE WITH ANY AVAILABLE PROVIDER ASAP

## 2023-08-18 ENCOUNTER — CLINICAL SUPPORT (OUTPATIENT)
Dept: FAMILY MEDICINE CLINIC | Facility: CLINIC | Age: 63
End: 2023-08-18
Payer: COMMERCIAL

## 2023-08-18 DIAGNOSIS — E03.9 HYPOTHYROIDISM, ACQUIRED: ICD-10-CM

## 2023-08-18 DIAGNOSIS — R73.03 PREDIABETES: ICD-10-CM

## 2023-08-18 DIAGNOSIS — E78.2 MIXED HYPERLIPIDEMIA: Chronic | ICD-10-CM

## 2023-08-18 LAB
ALBUMIN SERPL-MCNC: 4.2 G/DL (ref 3.5–5.2)
ALBUMIN/GLOB SERPL: 1.4 G/DL
ALP SERPL-CCNC: 124 U/L (ref 39–117)
ALT SERPL W P-5'-P-CCNC: 26 U/L (ref 1–41)
ANION GAP SERPL CALCULATED.3IONS-SCNC: 8.8 MMOL/L (ref 5–15)
AST SERPL-CCNC: 20 U/L (ref 1–40)
BILIRUB SERPL-MCNC: 0.3 MG/DL (ref 0–1.2)
BUN SERPL-MCNC: 20 MG/DL (ref 8–23)
BUN/CREAT SERPL: 12.6 (ref 7–25)
CALCIUM SPEC-SCNC: 9.3 MG/DL (ref 8.6–10.5)
CHLORIDE SERPL-SCNC: 106 MMOL/L (ref 98–107)
CHOLEST SERPL-MCNC: 152 MG/DL (ref 0–200)
CO2 SERPL-SCNC: 27.2 MMOL/L (ref 22–29)
CREAT SERPL-MCNC: 1.59 MG/DL (ref 0.76–1.27)
EGFRCR SERPLBLD CKD-EPI 2021: 48.8 ML/MIN/1.73
GLOBULIN UR ELPH-MCNC: 3 GM/DL
GLUCOSE SERPL-MCNC: 113 MG/DL (ref 65–99)
HDLC SERPL-MCNC: 48 MG/DL (ref 40–60)
LDLC SERPL CALC-MCNC: 74 MG/DL (ref 0–100)
LDLC/HDLC SERPL: 1.42 {RATIO}
POTASSIUM SERPL-SCNC: 4.8 MMOL/L (ref 3.5–5.2)
PROT SERPL-MCNC: 7.2 G/DL (ref 6–8.5)
SODIUM SERPL-SCNC: 142 MMOL/L (ref 136–145)
TRIGL SERPL-MCNC: 180 MG/DL (ref 0–150)
TSH SERPL DL<=0.05 MIU/L-ACNC: 3.45 UIU/ML (ref 0.27–4.2)
VLDLC SERPL-MCNC: 30 MG/DL (ref 5–40)

## 2023-08-18 PROCEDURE — 84443 ASSAY THYROID STIM HORMONE: CPT | Performed by: NURSE PRACTITIONER

## 2023-08-18 PROCEDURE — 80061 LIPID PANEL: CPT | Performed by: NURSE PRACTITIONER

## 2023-08-18 PROCEDURE — 36415 COLL VENOUS BLD VENIPUNCTURE: CPT | Performed by: NURSE PRACTITIONER

## 2023-08-18 PROCEDURE — 80053 COMPREHEN METABOLIC PANEL: CPT | Performed by: NURSE PRACTITIONER

## 2023-08-18 NOTE — PROGRESS NOTES
Venipuncture Blood Specimen Collection  Venipuncture performed in left arm by Merle Jason with good hemostasis. Patient tolerated the procedure well without complications.   08/18/23   Merle Jason

## 2023-08-21 NOTE — PROGRESS NOTES
Please mail letter to patient stating    Ronnie the comprehensive panel shows your glucose was 113 and it should be less than 100 if fasting; and then normal electrolytes and all of the liver function tests were normal with the exception of the alkaline phosphatase slightly elevated at 124 and it should be 117 or less and then stable chronic kidney disease stage III;     Cholesterol panel shows everything normal with the exception of triglycerides at 180 and this is the lowest you have been in over 3 years and normal would be less than 150 when fasting; and thyroid levels were normal range

## 2023-09-29 DIAGNOSIS — R97.20 ELEVATED PROSTATE SPECIFIC ANTIGEN (PSA): ICD-10-CM

## 2023-09-29 DIAGNOSIS — N32.0 BLADDER OUTLET OBSTRUCTION: ICD-10-CM

## 2023-09-29 DIAGNOSIS — N40.0 ENLARGED PROSTATE ON RECTAL EXAMINATION: ICD-10-CM

## 2023-10-02 RX ORDER — TAMSULOSIN HYDROCHLORIDE 0.4 MG/1
CAPSULE ORAL
Qty: 30 CAPSULE | Refills: 6 | Status: SHIPPED | OUTPATIENT
Start: 2023-10-02

## 2023-11-16 ENCOUNTER — OFFICE VISIT (OUTPATIENT)
Dept: FAMILY MEDICINE CLINIC | Facility: CLINIC | Age: 63
End: 2023-11-16
Payer: COMMERCIAL

## 2023-11-16 VITALS
OXYGEN SATURATION: 94 % | DIASTOLIC BLOOD PRESSURE: 78 MMHG | HEART RATE: 100 BPM | HEIGHT: 70 IN | BODY MASS INDEX: 39.37 KG/M2 | WEIGHT: 275 LBS | SYSTOLIC BLOOD PRESSURE: 110 MMHG | TEMPERATURE: 97.1 F

## 2023-11-16 DIAGNOSIS — S46.009A ROTATOR CUFF INJURY, INITIAL ENCOUNTER: ICD-10-CM

## 2023-11-16 DIAGNOSIS — M25.512 ACUTE PAIN OF LEFT SHOULDER: Primary | ICD-10-CM

## 2023-11-16 RX ORDER — DAPAGLIFLOZIN 10 MG/1
1 TABLET, FILM COATED ORAL DAILY
COMMUNITY
Start: 2023-09-09

## 2023-11-16 NOTE — PROGRESS NOTES
"Chief Complaint  Shoulder Pain (Left shoulder pain from fall Saturday. )    Subjective      History of Present Illness  Ronnie Baron is a 63 y.o. male who presents to [facility] with a past medical history of  Past Medical History:   Diagnosis Date    Alteration in appetite     Anxiety     CKD (chronic kidney disease) stage 3, GFR 30-59 ml/min     Depression     severe recurrent major depression with psychotic features    DVT (deep venous thrombosis)     Elevated PSA 09/22/22    Enlarged prostate on rectal examination 09/26/2022    Hyperglycemia     Hyperlipidemia     OCD (obsessive compulsive disorder)     TALON on AUTOCPAP 02/26/2019    Moderate severity TALON with AHI 20 events per hour.  Sleep related hypoxia present.    Renal insufficiency     Tachycardia     Vitamin D deficiency      Left shoulder pain from fall Saturday. Fell about 4 ft onto that shoulder about 5 days ago. Went to urgent care then and they got XR, no fracture seen according to patient. He feels like it is frozen, he can barely extend the shoulder. Pain is not bad.  It is the lack of mobility in that shoulder that mainly bothers him.    Passive ROM is greater than active ROM.    Objective   Vital Signs:   Vitals:    11/16/23 1004   BP: 110/78   Pulse: 100   Temp: 97.1 °F (36.2 °C)   SpO2: 94%   Weight: 125 kg (275 lb)   Height: 176.5 cm (69.5\")     Body mass index is 40.03 kg/m².    Wt Readings from Last 3 Encounters:   11/16/23 125 kg (275 lb)   06/22/23 123 kg (271 lb)   05/01/23 131 kg (288 lb)     BP Readings from Last 3 Encounters:   11/16/23 110/78   06/22/23 138/62   05/01/23 146/84       Health Maintenance   Topic Date Due    COVID-19 Vaccine (1) Never done    ANNUAL PHYSICAL  Never done    BMI FOLLOWUP  07/13/2023    LIPID PANEL  08/18/2024    COLORECTAL CANCER SCREENING  08/03/2025    TDAP/TD VACCINES (3 - Td or Tdap) 07/01/2026    HEPATITIS C SCREENING  Completed    ZOSTER VACCINE  Completed    " Pneumococcal Vaccine 0-64  Aged Out    INFLUENZA VACCINE  Discontinued       Physical Exam  Vitals and nursing note reviewed.   Constitutional:       General: He is not in acute distress.  Cardiovascular:      Rate and Rhythm: Normal rate and regular rhythm.      Heart sounds: Normal heart sounds.   Pulmonary:      Effort: Pulmonary effort is normal. No respiratory distress.      Breath sounds: Normal breath sounds.   Musculoskeletal:      Comments: Left shoulder nontender diffusely.  He can extend arm forward without much difficulty though does have some pain lifting arm above the level of his shoulder.  He can reach behind his back without too much pain or difficulty.  He can barely lift his arm to the side, not even able to make it to 45 degrees with active range of motion.  His passive range of motion is completely full in all directions though it does cause him some pain in doing so.   Neurological:      Mental Status: He is alert.   Psychiatric:         Mood and Affect: Mood normal.         Behavior: Behavior normal.            Result Review :  The following data was reviewed by: Ankit Mcdonald MD on 11/16/2023:      Procedures        Assessment and Plan   Diagnoses and all orders for this visit:    1. Acute pain of left shoulder (Primary)  -     Ambulatory Referral to Orthopedic Surgery  -     MRI Shoulder Left Without Contrast; Future    2. Rotator cuff injury, initial encounter  -     Ambulatory Referral to Orthopedic Surgery  -     MRI Shoulder Left Without Contrast; Future    Concern for rotator cuff injury based on exam.  He reports x-rays were normal though we are unable to view those images in our system.  I am going to order an MRI of the left shoulder to check for a rotator cuff tear.  I am also referring him over to orthopedic surgery given this concern.    Class 2 Severe Obesity (BMI >=35 and <=39.9). Obesity-related health conditions include the following: diabetes mellitus, dyslipidemias,  and osteoarthritis. Obesity is unchanged. BMI is is above average; BMI management plan is completed. We discussed Information on healthy weight added to patient's after visit summary.         FOLLOW UP  Return if symptoms worsen or fail to improve.  Patient was given instructions and counseling regarding his condition or for health maintenance advice. Please see specific information pulled into the AVS if appropriate.       Ankit Mcdonald MD  11/16/23  11:10 EST    CURRENT & DISCONTINUED MEDICATIONS  Current Outpatient Medications   Medication Instructions    aspirin 81 mg, Oral, Daily    cholecalciferol (VITAMIN D3) 2,000 Units, Oral, Daily    clomiPRAMINE (ANAFRANIL) 50 MG capsule No dose, route, or frequency recorded.    clonazePAM (KlonoPIN) 0.5 MG tablet No dose, route, or frequency recorded.    Farxiga 10 MG tablet 1 tablet, Oral, Daily    fluvoxaMINE (LUVOX) 100 MG tablet No dose, route, or frequency recorded.    gabapentin (NEURONTIN) 600 MG tablet take 1 tablet by mouth TWICE a day    levothyroxine (SYNTHROID) 50 mcg, Oral, Every Morning    LORazepam (ATIVAN) 0.5 MG tablet No dose, route, or frequency recorded.    Multiple Vitamins-Minerals (MENS MULTI VITAMIN & MINERAL PO) Oral    OLANZapine (zyPREXA) 5 MG tablet No dose, route, or frequency recorded.    rosuvastatin (CRESTOR) 10 mg, Oral, Daily, for cholesterol    tamsulosin (FLOMAX) 0.4 MG capsule 24 hr capsule TAKE 1 CAPSULE BY MOUTH EVERY DAY    traZODone (DESYREL) 50 MG tablet No dose, route, or frequency recorded.       There are no discontinued medications.

## 2023-12-01 ENCOUNTER — TRANSCRIBE ORDERS (OUTPATIENT)
Dept: ADMINISTRATIVE | Facility: HOSPITAL | Age: 63
End: 2023-12-01
Payer: COMMERCIAL

## 2023-12-01 ENCOUNTER — LAB (OUTPATIENT)
Dept: LAB | Facility: HOSPITAL | Age: 63
End: 2023-12-01
Payer: COMMERCIAL

## 2023-12-01 DIAGNOSIS — N18.30 STAGE 3 CHRONIC KIDNEY DISEASE, UNSPECIFIED WHETHER STAGE 3A OR 3B CKD: ICD-10-CM

## 2023-12-01 DIAGNOSIS — N18.30 STAGE 3 CHRONIC KIDNEY DISEASE, UNSPECIFIED WHETHER STAGE 3A OR 3B CKD: Primary | ICD-10-CM

## 2023-12-01 LAB
ANION GAP SERPL CALCULATED.3IONS-SCNC: 10.8 MMOL/L (ref 5–15)
BASOPHILS # BLD AUTO: 0.07 10*3/MM3 (ref 0–0.2)
BASOPHILS NFR BLD AUTO: 1.1 % (ref 0–1.5)
BILIRUB UR QL STRIP: NEGATIVE
BUN SERPL-MCNC: 17 MG/DL (ref 8–23)
BUN/CREAT SERPL: 10 (ref 7–25)
CALCIUM SPEC-SCNC: 9.6 MG/DL (ref 8.6–10.5)
CHLORIDE SERPL-SCNC: 102 MMOL/L (ref 98–107)
CLARITY UR: CLEAR
CO2 SERPL-SCNC: 29.2 MMOL/L (ref 22–29)
COLOR UR: YELLOW
CREAT SERPL-MCNC: 1.7 MG/DL (ref 0.76–1.27)
DEPRECATED RDW RBC AUTO: 41.5 FL (ref 37–54)
EGFRCR SERPLBLD CKD-EPI 2021: 45 ML/MIN/1.73
EOSINOPHIL # BLD AUTO: 0.11 10*3/MM3 (ref 0–0.4)
EOSINOPHIL NFR BLD AUTO: 1.7 % (ref 0.3–6.2)
ERYTHROCYTE [DISTWIDTH] IN BLOOD BY AUTOMATED COUNT: 12.4 % (ref 12.3–15.4)
GLUCOSE SERPL-MCNC: 94 MG/DL (ref 65–99)
GLUCOSE UR STRIP-MCNC: ABNORMAL MG/DL
HCT VFR BLD AUTO: 49.3 % (ref 37.5–51)
HGB BLD-MCNC: 16.7 G/DL (ref 13–17.7)
HGB UR QL STRIP.AUTO: NEGATIVE
IMM GRANULOCYTES # BLD AUTO: 0.03 10*3/MM3 (ref 0–0.05)
IMM GRANULOCYTES NFR BLD AUTO: 0.5 % (ref 0–0.5)
KETONES UR QL STRIP: NEGATIVE
LEUKOCYTE ESTERASE UR QL STRIP.AUTO: NEGATIVE
LYMPHOCYTES # BLD AUTO: 2.59 10*3/MM3 (ref 0.7–3.1)
LYMPHOCYTES NFR BLD AUTO: 40.2 % (ref 19.6–45.3)
MCH RBC QN AUTO: 30.8 PG (ref 26.6–33)
MCHC RBC AUTO-ENTMCNC: 33.9 G/DL (ref 31.5–35.7)
MCV RBC AUTO: 91 FL (ref 79–97)
MONOCYTES # BLD AUTO: 0.54 10*3/MM3 (ref 0.1–0.9)
MONOCYTES NFR BLD AUTO: 8.4 % (ref 5–12)
NEUTROPHILS NFR BLD AUTO: 3.11 10*3/MM3 (ref 1.7–7)
NEUTROPHILS NFR BLD AUTO: 48.1 % (ref 42.7–76)
NITRITE UR QL STRIP: NEGATIVE
NRBC BLD AUTO-RTO: 0 /100 WBC (ref 0–0.2)
PH UR STRIP.AUTO: 5.5 [PH] (ref 5–8)
PLATELET # BLD AUTO: 252 10*3/MM3 (ref 140–450)
PMV BLD AUTO: 10.8 FL (ref 6–12)
POTASSIUM SERPL-SCNC: 4.6 MMOL/L (ref 3.5–5.2)
PROT UR QL STRIP: NEGATIVE
RBC # BLD AUTO: 5.42 10*6/MM3 (ref 4.14–5.8)
SODIUM SERPL-SCNC: 142 MMOL/L (ref 136–145)
SP GR UR STRIP: 1.02 (ref 1–1.03)
UROBILINOGEN UR QL STRIP: ABNORMAL
WBC NRBC COR # BLD AUTO: 6.45 10*3/MM3 (ref 3.4–10.8)

## 2023-12-01 PROCEDURE — 36415 COLL VENOUS BLD VENIPUNCTURE: CPT

## 2023-12-01 PROCEDURE — 80048 BASIC METABOLIC PNL TOTAL CA: CPT

## 2023-12-01 PROCEDURE — 81003 URINALYSIS AUTO W/O SCOPE: CPT

## 2023-12-01 PROCEDURE — 85025 COMPLETE CBC W/AUTO DIFF WBC: CPT

## 2023-12-04 ENCOUNTER — TELEPHONE (OUTPATIENT)
Dept: FAMILY MEDICINE CLINIC | Facility: CLINIC | Age: 63
End: 2023-12-04

## 2023-12-04 NOTE — TELEPHONE ENCOUNTER
Caller: Arielle Baron    Relationship: Emergency Contact    Best call back number: [phone]    Caller requesting test results: WIFE     What test was performed: MRI ON LEFT SHOULDER     When was the test performed: 11/30/2023    Where was the test performed: [facility] [address]     Additional notes: PATIENT'S WIFE IS CHECKING ON MRI RESULTS.

## 2023-12-07 ENCOUNTER — TELEPHONE (OUTPATIENT)
Dept: FAMILY MEDICINE CLINIC | Facility: CLINIC | Age: 63
End: 2023-12-07
Payer: COMMERCIAL

## 2023-12-07 NOTE — TELEPHONE ENCOUNTER
Caller: Arielle Baron    Relationship to patient: Emergency Contact    Best call back number: [phone]    Patient is needing: PATIENT'S WIFE CALLED IN AND WOULD LIKE TO HAVE A NEW REFERRAL FOR  TO BE SEEN REGARDING SHOULDER AND SHE WOULD LIKE IT TO GO TO DR. OTIS ZIMMERMAN IN [place]. PATIENT'S WIFE WOULD LIKE HIM TO BE SEEN THIS YEAR IF POSSIBLE PLEASE DUE TO DEDUCTIBLES.

## 2023-12-10 DIAGNOSIS — E03.9 HYPOTHYROIDISM, ACQUIRED: ICD-10-CM

## 2023-12-10 DIAGNOSIS — E78.2 MIXED HYPERLIPIDEMIA: Chronic | ICD-10-CM

## 2023-12-11 RX ORDER — ROSUVASTATIN CALCIUM 10 MG/1
10 TABLET, COATED ORAL DAILY
Qty: 90 TABLET | Refills: 0 | Status: SHIPPED | OUTPATIENT
Start: 2023-12-11

## 2023-12-11 RX ORDER — LEVOTHYROXINE SODIUM 50 MCG
50 TABLET ORAL EVERY MORNING
Qty: 90 TABLET | Refills: 0 | Status: SHIPPED | OUTPATIENT
Start: 2023-12-11

## 2024-01-11 ENCOUNTER — OFFICE VISIT (OUTPATIENT)
Dept: UROLOGY | Facility: CLINIC | Age: 64
End: 2024-01-11
Payer: COMMERCIAL

## 2024-01-11 VITALS
DIASTOLIC BLOOD PRESSURE: 74 MMHG | SYSTOLIC BLOOD PRESSURE: 121 MMHG | HEART RATE: 90 BPM | WEIGHT: 274.4 LBS | BODY MASS INDEX: 40.64 KG/M2 | TEMPERATURE: 98.1 F | HEIGHT: 69 IN

## 2024-01-11 DIAGNOSIS — N41.8 OTHER PROSTATIC INFLAMMATORY DISEASES: ICD-10-CM

## 2024-01-11 DIAGNOSIS — N13.8 BPH WITH URINARY OBSTRUCTION: Primary | ICD-10-CM

## 2024-01-11 DIAGNOSIS — N40.0 ENLARGED PROSTATE ON RECTAL EXAMINATION: ICD-10-CM

## 2024-01-11 DIAGNOSIS — N40.1 BPH WITH URINARY OBSTRUCTION: Primary | ICD-10-CM

## 2024-01-11 DIAGNOSIS — Z87.898 HISTORY OF ELEVATED PROSTATE SPECIFIC ANTIGEN (PSA): ICD-10-CM

## 2024-01-11 PROBLEM — R97.20 ELEVATED PROSTATE SPECIFIC ANTIGEN (PSA): Status: RESOLVED | Noted: 2022-09-26 | Resolved: 2024-01-11

## 2024-01-11 LAB
BILIRUB BLD-MCNC: NEGATIVE MG/DL
CLARITY, POC: CLEAR
COLOR UR: YELLOW
EXPIRATION DATE: ABNORMAL
GLUCOSE UR STRIP-MCNC: ABNORMAL MG/DL
KETONES UR QL: NEGATIVE
LEUKOCYTE EST, POC: NEGATIVE
Lab: ABNORMAL
NITRITE UR-MCNC: NEGATIVE MG/ML
PH UR: 6 [PH] (ref 5–8)
PROT UR STRIP-MCNC: NEGATIVE MG/DL
RBC # UR STRIP: ABNORMAL /UL
SP GR UR: 1.02 (ref 1–1.03)
UROBILINOGEN UR QL: ABNORMAL

## 2024-01-11 RX ORDER — CETIRIZINE HYDROCHLORIDE 10 MG/1
10 TABLET ORAL DAILY
COMMUNITY

## 2024-01-11 RX ORDER — SULFAMETHOXAZOLE AND TRIMETHOPRIM 800; 160 MG/1; MG/1
1 TABLET ORAL 2 TIMES DAILY
Qty: 42 TABLET | Refills: 0 | Status: SHIPPED | OUTPATIENT
Start: 2024-01-11 | End: 2024-02-01

## 2024-01-11 RX ORDER — HYDROCODONE BITARTRATE AND ACETAMINOPHEN 5; 325 MG/1; MG/1
1 TABLET ORAL EVERY 6 HOURS PRN
COMMUNITY
Start: 2023-12-20

## 2024-01-11 NOTE — PROGRESS NOTES
"Chief Complaint  Benign Prostatic Hypertrophy (Follow/up)    Subjective          Ronnie Baron is a 63 y.o. male who presents to [facility] UROLOGY  History of Present Illness  Follow up of bph and history of elevated psa resolved after treatment of prostatitis. He has been taking tamsulosin and states he has been having symptoms of incomplete emptying for the past 6 months.  Flow is not as strong but notices toward the end it is hesitant.  Voiding approximately 7 times during the day.  Denies dysuria or gross hematuria.      Objective   Vital Signs:   /74 (BP Location: Right arm, Patient Position: Sitting, Cuff Size: Adult)   Pulse 90   Temp 98.1 °F (36.7 °C) (Oral)   Ht 176.5 cm (69.49\")   Wt 124 kg (274 lb 6.4 oz)   BMI 39.95 kg/m²     No Known Allergies   Past medical history:  has a past medical history of Alteration in appetite, Anxiety, CKD (chronic kidney disease) stage 3, GFR 30-59 ml/min, Depression, DVT (deep venous thrombosis), Elevated PSA (09/22/22), Enlarged prostate on rectal examination (09/26/2022), Hyperglycemia, Hyperlipidemia, OCD (obsessive compulsive disorder), TALON on AUTOCPAP (02/26/2019), Renal insufficiency, Tachycardia, and Vitamin D deficiency.   Past surgical history:  has a past surgical history that includes Elbow surgery (06/2014).  Personal history: family history includes Cancer in his maternal grandmother; Depression in his father and mother; Heart disease in his maternal grandfather; Hypertension in his father and mother; Kidney disease in his mother; Stroke in his mother.  Social history:  reports that he quit smoking about 32 years ago. His smoking use included cigarettes. He has a 20.00 pack-year smoking history. He quit smokeless tobacco use about 33 years ago. He reports current alcohol use. He reports that he does not use drugs.    Review of Systems   Constitutional:  Negative for chills and fever.        Physical Exam  Vitals and nursing note " reviewed.   Constitutional:       General: He is not in acute distress.     Appearance: Normal appearance. He is well-developed. He is not ill-appearing.      Comments: Ambulates without difficulty   Cardiovascular:      Rate and Rhythm: Tachycardia present.   Pulmonary:      Effort: Pulmonary effort is normal.      Breath sounds: Normal breath sounds and air entry.   Abdominal:      Tenderness: There is no abdominal tenderness. There is no guarding or rebound.   Genitourinary:     Comments: Prostate approximately 45+ g with no discrete nodules.  Firm with tenderness  Musculoskeletal:         General: Normal range of motion.   Skin:     General: Skin is warm and dry.   Neurological:      Mental Status: He is alert and oriented to person, place, and time.      Motor: Motor function is intact.   Psychiatric:         Mood and Affect: Mood normal.         Behavior: Behavior normal. Behavior is cooperative.         Thought Content: Thought content normal.         Judgment: Judgment normal.        Result Review :     PSA   Date Value Ref Range Status   10/28/2022 3.580 0.000 - 4.000 ng/mL Final   09/07/2022 4.520 (H) 0.000 - 4.000 ng/mL Final   01/16/2019 2.010 0.000 - 4.000 ng/mL Final        Results for orders placed or performed in visit on 01/11/24   POC Urinalysis Dipstick, Automated    Specimen: Urine   Result Value Ref Range    Color Yellow Yellow, Straw, Dark Yellow, Candace    Clarity, UA Clear Clear    Specific Gravity  1.025 1.005 - 1.030    pH, Urine 6.0 5.0 - 8.0    Leukocytes Negative Negative    Nitrite, UA Negative Negative    Protein, POC Negative Negative mg/dL    Glucose,  mg/dL (A) Negative mg/dL    Ketones, UA Negative Negative    Urobilinogen, UA 0.2 E.U./dL Normal, 0.2 E.U./dL    Bilirubin Negative Negative    Blood, UA Trace (A) Negative    Lot Number [id number]     Expiration Date 10/2,024               Assessment and Plan    Diagnoses and all orders for this visit:    1. BPH with urinary  obstruction (Primary)  -     POC Urinalysis Dipstick, Automated  -     sulfamethoxazole-trimethoprim (Bactrim DS) 800-160 MG per tablet; Take 1 tablet by mouth 2 (Two) Times a Day for 21 days. Take with food  Dispense: 42 tablet; Refill: 0  -     PSA Total+% Free (Serial); Future    2. History of elevated prostate specific antigen (PSA)  -     POC Urinalysis Dipstick, Automated  -     sulfamethoxazole-trimethoprim (Bactrim DS) 800-160 MG per tablet; Take 1 tablet by mouth 2 (Two) Times a Day for 21 days. Take with food  Dispense: 42 tablet; Refill: 0  -     PSA Total+% Free (Serial); Future    3. Other prostatic inflammatory diseases  -     POC Urinalysis Dipstick, Automated  -     sulfamethoxazole-trimethoprim (Bactrim DS) 800-160 MG per tablet; Take 1 tablet by mouth 2 (Two) Times a Day for 21 days. Take with food  Dispense: 42 tablet; Refill: 0  -     PSA Total+% Free (Serial); Future    4. Enlarged prostate on rectal examination  -     PSA Total+% Free (Serial); Future      Patient is taking 1 tamsulosin in the evening.  He states that his symptoms are not controlled and feeling of incomplete emptying.  Increase tamsulosin to 2 capsules in evening patient has adequate supply at home for mail order.  Will not change prescription until determine if patient able to tolerate.  If having problems with dizziness to return to 1 capsule.  Bactrim DS for 3 weeks to treat prostatitis.  He is to have his PSA total and percent free collected per lab.  Want to establish baseline prior to possibly begin finasteride as discussed in office.  Patient hesitant about any invasive procedures or surgical procedures.  Follow-up in approximately 5 weeks to discuss lab and symptoms.  Information on cystoscopy provided and patient will consider.        Follow Up   Return in about 5 weeks (around 2/15/2024) for next scheduled follow up after lab resulted.  Patient was given instructions and counseling regarding his condition or for  health maintenance advice. Please see specific information pulled into the AVS if appropriate.     CONSUELO Alvarado

## 2024-02-07 ENCOUNTER — LAB (OUTPATIENT)
Dept: LAB | Facility: HOSPITAL | Age: 64
End: 2024-02-07
Payer: COMMERCIAL

## 2024-02-07 DIAGNOSIS — Z87.898 HISTORY OF ELEVATED PROSTATE SPECIFIC ANTIGEN (PSA): ICD-10-CM

## 2024-02-07 DIAGNOSIS — N41.8 OTHER PROSTATIC INFLAMMATORY DISEASES: ICD-10-CM

## 2024-02-07 DIAGNOSIS — N40.1 BPH WITH URINARY OBSTRUCTION: ICD-10-CM

## 2024-02-07 DIAGNOSIS — N40.0 ENLARGED PROSTATE ON RECTAL EXAMINATION: ICD-10-CM

## 2024-02-07 DIAGNOSIS — N13.8 BPH WITH URINARY OBSTRUCTION: ICD-10-CM

## 2024-02-07 LAB
ALBUMIN SERPL-MCNC: 4.3 G/DL (ref 3.5–5.2)
ALBUMIN/GLOB SERPL: 1.5 G/DL
ALP SERPL-CCNC: 189 U/L (ref 39–117)
ALT SERPL W P-5'-P-CCNC: 29 U/L (ref 1–41)
ANION GAP SERPL CALCULATED.3IONS-SCNC: 7 MMOL/L (ref 5–15)
AST SERPL-CCNC: 23 U/L (ref 1–40)
BILIRUB SERPL-MCNC: 0.3 MG/DL (ref 0–1.2)
BUN SERPL-MCNC: 20 MG/DL (ref 8–23)
BUN/CREAT SERPL: 12.4 (ref 7–25)
CALCIUM SPEC-SCNC: 9.6 MG/DL (ref 8.6–10.5)
CHLORIDE SERPL-SCNC: 105 MMOL/L (ref 98–107)
CHOLEST SERPL-MCNC: 170 MG/DL (ref 0–200)
CO2 SERPL-SCNC: 28 MMOL/L (ref 22–29)
CREAT SERPL-MCNC: 1.61 MG/DL (ref 0.76–1.27)
EGFRCR SERPLBLD CKD-EPI 2021: 48.1 ML/MIN/1.73
GLOBULIN UR ELPH-MCNC: 2.9 GM/DL
GLUCOSE SERPL-MCNC: 109 MG/DL (ref 65–99)
HDLC SERPL-MCNC: 51 MG/DL (ref 40–60)
LDLC SERPL CALC-MCNC: 92 MG/DL (ref 0–100)
LDLC/HDLC SERPL: 1.71 {RATIO}
POTASSIUM SERPL-SCNC: 4.7 MMOL/L (ref 3.5–5.2)
PROT SERPL-MCNC: 7.2 G/DL (ref 6–8.5)
SODIUM SERPL-SCNC: 140 MMOL/L (ref 136–145)
TRIGL SERPL-MCNC: 158 MG/DL (ref 0–150)
TSH SERPL DL<=0.05 MIU/L-ACNC: 3.26 UIU/ML (ref 0.27–4.2)
VLDLC SERPL-MCNC: 27 MG/DL (ref 5–40)

## 2024-02-07 PROCEDURE — 84443 ASSAY THYROID STIM HORMONE: CPT | Performed by: NURSE PRACTITIONER

## 2024-02-07 PROCEDURE — 80061 LIPID PANEL: CPT | Performed by: NURSE PRACTITIONER

## 2024-02-07 PROCEDURE — 84154 ASSAY OF PSA FREE: CPT

## 2024-02-07 PROCEDURE — 80053 COMPREHEN METABOLIC PANEL: CPT | Performed by: NURSE PRACTITIONER

## 2024-02-07 PROCEDURE — 36415 COLL VENOUS BLD VENIPUNCTURE: CPT

## 2024-02-07 PROCEDURE — 84153 ASSAY OF PSA TOTAL: CPT

## 2024-02-08 LAB
PSA FREE MFR SERPL: 14 %
PSA FREE SERPL-MCNC: 0.6 NG/ML
PSA SERPL-MCNC: 4.3 NG/ML (ref 0–4)

## 2024-02-16 ENCOUNTER — OFFICE VISIT (OUTPATIENT)
Dept: UROLOGY | Facility: CLINIC | Age: 64
End: 2024-02-16
Payer: COMMERCIAL

## 2024-02-16 VITALS
BODY MASS INDEX: 42.18 KG/M2 | DIASTOLIC BLOOD PRESSURE: 73 MMHG | TEMPERATURE: 98 F | HEIGHT: 69 IN | SYSTOLIC BLOOD PRESSURE: 117 MMHG | HEART RATE: 111 BPM | WEIGHT: 284.8 LBS

## 2024-02-16 DIAGNOSIS — N40.1 BPH WITH URINARY OBSTRUCTION: ICD-10-CM

## 2024-02-16 DIAGNOSIS — Z87.438 HISTORY OF PROSTATITIS: ICD-10-CM

## 2024-02-16 DIAGNOSIS — N32.0 BLADDER OUTLET OBSTRUCTION: ICD-10-CM

## 2024-02-16 DIAGNOSIS — R97.20 ELEVATED PROSTATE SPECIFIC ANTIGEN (PSA): Primary | ICD-10-CM

## 2024-02-16 DIAGNOSIS — N13.8 BPH WITH URINARY OBSTRUCTION: ICD-10-CM

## 2024-02-16 LAB
BILIRUB BLD-MCNC: NEGATIVE MG/DL
CLARITY, POC: CLEAR
COLOR UR: YELLOW
EXPIRATION DATE: ABNORMAL
GLUCOSE UR STRIP-MCNC: ABNORMAL MG/DL
KETONES UR QL: NEGATIVE
LEUKOCYTE EST, POC: NEGATIVE
Lab: ABNORMAL
NITRITE UR-MCNC: NEGATIVE MG/ML
PH UR: 5.5 [PH] (ref 5–8)
PROT UR STRIP-MCNC: NEGATIVE MG/DL
RBC # UR STRIP: ABNORMAL /UL
SP GR UR: 1.02 (ref 1–1.03)
UROBILINOGEN UR QL: NORMAL

## 2024-02-16 RX ORDER — SULFAMETHOXAZOLE AND TRIMETHOPRIM 800; 160 MG/1; MG/1
1 TABLET ORAL 2 TIMES DAILY
Qty: 28 TABLET | Refills: 0 | Status: SHIPPED | OUTPATIENT
Start: 2024-02-16 | End: 2024-03-01

## 2024-03-07 ENCOUNTER — OFFICE VISIT (OUTPATIENT)
Dept: FAMILY MEDICINE CLINIC | Facility: CLINIC | Age: 64
End: 2024-03-07
Payer: COMMERCIAL

## 2024-03-07 VITALS
DIASTOLIC BLOOD PRESSURE: 74 MMHG | SYSTOLIC BLOOD PRESSURE: 132 MMHG | HEIGHT: 70 IN | TEMPERATURE: 96.4 F | WEIGHT: 277 LBS | HEART RATE: 83 BPM | BODY MASS INDEX: 39.65 KG/M2 | OXYGEN SATURATION: 97 %

## 2024-03-07 DIAGNOSIS — E78.2 MIXED HYPERLIPIDEMIA: Primary | Chronic | ICD-10-CM

## 2024-03-07 DIAGNOSIS — Z01.89 LABORATORY EXAMINATION: ICD-10-CM

## 2024-03-07 DIAGNOSIS — E66.01 CLASS 3 SEVERE OBESITY WITH SERIOUS COMORBIDITY AND BODY MASS INDEX (BMI) OF 40.0 TO 44.9 IN ADULT, UNSPECIFIED OBESITY TYPE: ICD-10-CM

## 2024-03-07 DIAGNOSIS — E03.9 HYPOTHYROIDISM, ACQUIRED: ICD-10-CM

## 2024-03-07 RX ORDER — LEVOTHYROXINE SODIUM 0.05 MG/1
50 TABLET ORAL EVERY MORNING
Qty: 90 TABLET | Refills: 1 | Status: SHIPPED | OUTPATIENT
Start: 2024-03-07

## 2024-03-07 RX ORDER — SEMAGLUTIDE 0.25 MG/.5ML
0.25 INJECTION, SOLUTION SUBCUTANEOUS WEEKLY
Qty: 2 ML | Refills: 0 | Status: SHIPPED | OUTPATIENT
Start: 2024-03-07

## 2024-03-07 RX ORDER — ROSUVASTATIN CALCIUM 10 MG/1
10 TABLET, COATED ORAL DAILY
Qty: 90 TABLET | Refills: 1 | Status: SHIPPED | OUTPATIENT
Start: 2024-03-07

## 2024-03-07 NOTE — PROGRESS NOTES
Chief Complaint  Hyperlipidemia (Medication refills and discuss weight gain.)    Subjective            Ronnie Baron presents to [facility]  History of Present Illness  Patient is here today for medication refills on the chronic comorbid conditions managed from the primary care standpoint--and he is there in the office for his visit and we are doing his vital signs weight and going to discuss his labs that we just obtained prior to the visit per patient request-and his wife is present for the visit-and this is actually going to be a video telehealth visit utilizing the work pager    -- Dyslipidemia: Tolerating medication well with no side effects no issues reported no myalgias reported and overall received good efficacy as the cholesterol panel has rapidly improved and the triglycerides are down to 158    -- Hypothyroidism: Tolerating medication well with no side effects no issues reported no hair or nail or skin issues reported with good tolerability of the medication and good efficacy and the thyroid levels were in good range-but the patient is continuing to gain weight and he is concerned about that    -- Obesity: Patient and his wife are interested in his starting Wegovy for weight loss but he was concerned if he would be able to do this with his chronic kidney disease stage III and with all of the other medications that he is on for his mental health issues-and I did go over that it is safe renally and hepatically and they already reached out to their [employer] mail service and they do cover the Wegovy and they acted like they are able to access and send him the starting doses-I did explain to the patient that we would titrate the medication on a monthly basis based on response and if he has developed any side effects--he and his wife denied any personal or family history of medullary thyroid cancer or personal or family history of multiple endocrine neoplasia syndrome    --  Laboratory tests-to go over these today-the chronic kidney disease is stable and chronic kidney disease stage III with the EGFR in the 40s and his alkaline phosphatase had elevated into the 180 range and this was concerning for fatty liver disease as he has continued to gain weight-and they are in hopes that utilizing the Wegovy weekly shots would also assist with improving the liver function tests the cholesterol panel improved triglycerides are still modestly elevated thyroid levels were normal range    And patient is seeing urology regarding prostate    Also patient is seeing mental health provider regarding mental health issues and medication      PHQ-2 Total Score: 0  PHQ-9 Total Score: 0    Past Medical History:   Diagnosis Date    Alteration in appetite     Anxiety     CKD (chronic kidney disease) stage 3, GFR 30-59 ml/min     Depression     severe recurrent major depression with psychotic features    DVT (deep venous thrombosis)     Elevated PSA 22    Enlarged prostate on rectal examination 2022    Hyperglycemia     Hyperlipidemia     OCD (obsessive compulsive disorder)     TALON on AUTOCPAP 2019    Moderate severity TALON with AHI 20 events per hour.  Sleep related hypoxia present.    Renal insufficiency     Tachycardia     Vitamin D deficiency        No Known Allergies     Past Surgical History:   Procedure Laterality Date    ELBOW PROCEDURE  2014        Social History     Tobacco Use    Smoking status: Former     Current packs/day: 0.00     Average packs/day: 1 pack/day for 20.0 years (20.0 ttl pk-yrs)     Types: Cigarettes     Start date: 1972     Quit date: 1992     Years since quittin.2    Smokeless tobacco: Former     Quit date: 1990   Vaping Use    Vaping status: Never Used   Substance Use Topics    Alcohol use: Yes     Comment: rare occasional just pleasure  none since 2016    Drug use: No       Family History   Problem Relation Age of Onset    Hypertension  Mother     Stroke Mother         November 2016 with two blood clots    Depression Mother         takes welbutrin    Kidney disease Mother     Hypertension Father     Depression Father         takes medicine care give of wife    Heart disease Maternal Grandfather     Cancer Maternal Grandmother         Health Maintenance Due   Topic Date Due    ANNUAL PHYSICAL  Never done    RSV Vaccine - Adults (1 - 1-dose 60+ series) Never done    COVID-19 Vaccine (1 - 2023-24 season) Never done        Current Outpatient Medications on File Prior to Visit   Medication Sig    aspirin 81 MG EC tablet Take 1 tablet by mouth Daily.    cetirizine (zyrTEC) 10 MG tablet Take 1 tablet by mouth Daily.    clomiPRAMINE (ANAFRANIL) 50 MG capsule     clonazePAM (KlonoPIN) 0.5 MG tablet     Farxiga 10 MG tablet Take 10 mg by mouth Daily.    fluvoxaMINE (LUVOX) 100 MG tablet     gabapentin (NEURONTIN) 600 MG tablet take 1 tablet by mouth TWICE a day    LORazepam (ATIVAN) 0.5 MG tablet     Multiple Vitamins-Minerals (MENS MULTI VITAMIN & MINERAL PO) Take  by mouth.    OLANZapine (zyPREXA) 5 MG tablet     tamsulosin (FLOMAX) 0.4 MG capsule 24 hr capsule TAKE 1 CAPSULE BY MOUTH EVERY DAY    traZODone (DESYREL) 50 MG tablet     Vitamin D, Cholecalciferol, 1000 UNITS tablet Take 2 tablets by mouth Daily.    [DISCONTINUED] levothyroxine (Synthroid) 50 MCG tablet TAKE 1 TABLET EVERY MORNING    [DISCONTINUED] rosuvastatin (CRESTOR) 10 MG tablet TAKE 1 TABLET DAILY FOR    CHOLESTEROL    HYDROcodone-acetaminophen (NORCO) 5-325 MG per tablet Take 1 tablet by mouth Every 6 (Six) Hours As Needed for Mild Pain.     No current facility-administered medications on file prior to visit.       Immunization History   Administered Date(s) Administered    Shingrix 07/17/2019, 10/23/2019    Td (TDVAX) 07/06/1998    Tdap 07/01/2016       Review of Systems   Constitutional:  Positive for unexpected weight gain. Negative for fatigue and unexpected weight loss.   HENT:   "Negative for trouble swallowing.    Eyes:  Negative for double vision and discharge.   Respiratory:  Negative for shortness of breath.    Cardiovascular:  Negative for chest pain.   Gastrointestinal:  Negative for abdominal pain and blood in stool.   Endocrine: Negative for polydipsia, polyphagia and polyuria.   Genitourinary:  Positive for frequency.   Musculoskeletal: Negative.    Neurological:  Negative for dizziness, seizures, syncope and light-headedness.   Hematological:  Bruises/bleeds easily.   Psychiatric/Behavioral:  Negative for self-injury and suicidal ideas.         Objective     /74   Pulse 83   Temp 96.4 °F (35.8 °C) (Temporal)   Ht 176.5 cm (69.5\")   Wt 126 kg (277 lb)   SpO2 97%   BMI 40.32 kg/m²       Physical Exam  Vitals and nursing note reviewed. Exam conducted with a chaperone present (wife).   Constitutional:       Appearance: Normal appearance. He is obese.   HENT:      Head: Normocephalic.      Right Ear: External ear normal.      Left Ear: External ear normal.      Nose: Nose normal.      Mouth/Throat:      Mouth: Mucous membranes are moist.   Eyes:      Pupils: Pupils are equal, round, and reactive to light.   Cardiovascular:      Rate and Rhythm: Normal rate.   Pulmonary:      Effort: Pulmonary effort is normal.   Musculoskeletal:         General: Normal range of motion.      Cervical back: Normal range of motion.   Skin:     General: Skin is warm and dry.   Neurological:      Mental Status: He is alert and oriented to person, place, and time.   Psychiatric:         Mood and Affect: Mood normal.         Behavior: Behavior normal.         Thought Content: Thought content normal.         Judgment: Judgment normal.         Result Review :                      Office Visit with Michell Brown APRN (02/16/2024)   Comprehensive Metabolic Panel (02/07/2024 11:54)  Lipid Panel (02/07/2024 11:54)  TSH Rfx On Abnormal To Free T4 (02/07/2024 11:54)  PSA Total+% Free (Serial) " (02/07/2024 11:54)  POC Urinalysis Dipstick, Automated (02/16/2024 10:34)     Assessment and Plan      Diagnoses and all orders for this visit:    1. Mixed hyperlipidemia (Primary)  -     rosuvastatin (CRESTOR) 10 MG tablet; Take 1 tablet by mouth Daily. for cholesterol  Dispense: 90 tablet; Refill: 1    2. Hypothyroidism, acquired  -     levothyroxine (Synthroid) 50 MCG tablet; Take 1 tablet by mouth Every Morning.  Dispense: 90 tablet; Refill: 1    3. Class 3 severe obesity with serious comorbidity and body mass index (BMI) of 40.0 to 44.9 in adult, unspecified obesity type  -     Semaglutide-Weight Management (Wegovy) 0.25 MG/0.5ML solution auto-injector; Inject 0.5 mL under the skin into the appropriate area as directed 1 (One) Time Per Week.  Dispense: 2 mL; Refill: 0    4. Laboratory examination  Comments:  Discussing all of the recent labs we ordered    We did discuss all of the recent lab work together in the visit today and medication refills as noted above and we will initiate the Wegovy and I did discuss risks and benefits and side effects to watch for and how to administer the shot and his wife actually is a diabetic and takes Mounjaro so she is well versed in this and will assist him with this and he will follow-up in 1 month        Follow Up     Return in about 4 weeks (around 4/4/2024), or if symptoms worsen or fail to improve, for Recheck.    Patient was given instructions and counseling regarding his condition or for health maintenance advice. Please see specific information pulled into the AVS if appropriate.            Ronnie Baron  reports that he quit smoking about 32 years ago. His smoking use included cigarettes. He started smoking about 52 years ago. He has a 20 pack-year smoking history. He quit smokeless tobacco use about 33 years ago.. I have educated him on the risk of diseases from using tobacco products such as cancer, COPD, and heart disease.

## 2024-03-08 ENCOUNTER — TELEPHONE (OUTPATIENT)
Dept: UROLOGY | Facility: CLINIC | Age: 64
End: 2024-03-08

## 2024-03-08 NOTE — TELEPHONE ENCOUNTER
Patients wife called, he has MRI scheduled and wanted to know if a Valium can be sent into the pharmacy due to not like being in closed spaces

## 2024-03-11 DIAGNOSIS — R97.20 ELEVATED PROSTATE SPECIFIC ANTIGEN (PSA): Primary | ICD-10-CM

## 2024-03-11 DIAGNOSIS — Z86.59 HISTORY OF CLAUSTROPHOBIA: ICD-10-CM

## 2024-03-11 RX ORDER — DIAZEPAM 5 MG/1
5 TABLET ORAL TAKE AS DIRECTED
Qty: 2 TABLET | Refills: 0 | Status: SHIPPED | OUTPATIENT
Start: 2024-03-11

## 2024-03-22 DIAGNOSIS — N32.0 BLADDER OUTLET OBSTRUCTION: ICD-10-CM

## 2024-03-22 DIAGNOSIS — N40.1 BPH WITH URINARY OBSTRUCTION: Primary | ICD-10-CM

## 2024-03-22 DIAGNOSIS — N13.8 BPH WITH URINARY OBSTRUCTION: Primary | ICD-10-CM

## 2024-03-22 DIAGNOSIS — R97.20 ELEVATED PROSTATE SPECIFIC ANTIGEN (PSA): ICD-10-CM

## 2024-03-22 DIAGNOSIS — N40.0 ENLARGED PROSTATE ON RECTAL EXAMINATION: ICD-10-CM

## 2024-03-22 RX ORDER — TAMSULOSIN HYDROCHLORIDE 0.4 MG/1
2 CAPSULE ORAL EVERY EVENING
Qty: 60 CAPSULE | Refills: 5 | Status: SHIPPED | OUTPATIENT
Start: 2024-03-22

## 2024-03-23 ENCOUNTER — HOSPITAL ENCOUNTER (OUTPATIENT)
Facility: HOSPITAL | Age: 64
Discharge: HOME | End: 2024-03-23
Admitting: NURSE PRACTITIONER
Payer: COMMERCIAL

## 2024-03-23 DIAGNOSIS — R97.20 ELEVATED PROSTATE SPECIFIC ANTIGEN (PSA): ICD-10-CM

## 2024-03-23 PROCEDURE — 0 GADOBENATE DIMEGLUMINE 529 MG/ML SOLUTION: Performed by: NURSE PRACTITIONER

## 2024-03-23 PROCEDURE — A9577 INJ MULTIHANCE: HCPCS | Performed by: NURSE PRACTITIONER

## 2024-03-23 PROCEDURE — 72197 MRI PELVIS W/O & W/DYE: CPT

## 2024-03-23 RX ADMIN — GADOBENATE DIMEGLUMINE 20 ML: 529 INJECTION, SOLUTION INTRAVENOUS at 09:37

## 2024-04-01 ENCOUNTER — OFFICE VISIT (OUTPATIENT)
Dept: UROLOGY | Facility: CLINIC | Age: 64
End: 2024-04-01
Payer: COMMERCIAL

## 2024-04-01 VITALS
HEART RATE: 97 BPM | BODY MASS INDEX: 40.35 KG/M2 | DIASTOLIC BLOOD PRESSURE: 76 MMHG | SYSTOLIC BLOOD PRESSURE: 107 MMHG | TEMPERATURE: 98 F | HEIGHT: 69 IN | WEIGHT: 272.4 LBS

## 2024-04-01 DIAGNOSIS — N13.8 BPH WITH OBSTRUCTION/LOWER URINARY TRACT SYMPTOMS: ICD-10-CM

## 2024-04-01 DIAGNOSIS — R97.20 BPH WITH ELEVATED PSA: Primary | ICD-10-CM

## 2024-04-01 DIAGNOSIS — N40.1 BPH WITH OBSTRUCTION/LOWER URINARY TRACT SYMPTOMS: ICD-10-CM

## 2024-04-01 DIAGNOSIS — Z87.438 HISTORY OF PROSTATITIS: ICD-10-CM

## 2024-04-01 DIAGNOSIS — N40.0 BPH WITH ELEVATED PSA: Primary | ICD-10-CM

## 2024-04-01 NOTE — PROGRESS NOTES
"Chief Complaint  Elevated PSA (MRI FOLLOW/UP ) and Benign Prostatic Hypertrophy    Subjective          Ronnie Baron is a 63 y.o. male who presents to [facility]  History of Present Illness  Follow up mri prostate results for history of elevated psa level. Continued elevated psa level after treating prostatitis. Had been treated  with 3 weeks of antibiotics recently for another episode of prostatitis. Psa level recollected along with percent free after antibiotics completed. PSA once again elevated 4.3 and percent free 14.0. Per guidelines at his age of 63. Prostate cancer risk calculated as 25%. Patient still relayed at that time having obstructive symptoms and taking 2 tamsulosin at night. Noted to have enlarged prostate on Previous DANGELO's. Discussed since only 63 years of age recommendation of MRI of prostate for elevated psa prior to addition of finasteride which may alter psa levels future and prior to cystoscopy to evaluate prostate and bladder. MRI prostate does not indicate any suspicious lesions per PIRADS criteria.Finding suggestive of sequela prostatitis. No lymphadenopathy and bones normal.  Patient reports that is voiding has seemed to improved since last visit. Still taking 2 tamsulosin in the evening and no side effects.              Objective   Vital Signs:   /76 (BP Location: Left arm, Patient Position: Sitting, Cuff Size: Adult)   Pulse 97   Temp 98 °F (36.7 °C) (Oral)   Ht 176.5 cm (69.49\")   Wt 124 kg (272 lb 6.4 oz)   BMI 39.66 kg/m²     No Known Allergies   Past medical history:  has a past medical history of Alteration in appetite, Anxiety, Chronic prostatitis (4/2/2024), CKD (chronic kidney disease) stage 3, GFR 30-59 ml/min, Depression, DVT (deep venous thrombosis), Elevated PSA (09/22/22), Enlarged prostate on rectal examination (09/26/2022), Hyperglycemia, Hyperlipidemia, OCD (obsessive compulsive disorder), TALON on AUTOCPAP (02/26/2019), Renal " insufficiency, Tachycardia, and Vitamin D deficiency.   Past surgical history:  has a past surgical history that includes Elbow surgery (06/2014).  Personal history: family history includes Cancer in his maternal grandmother; Depression in his father and mother; Heart disease in his maternal grandfather; Hypertension in his father and mother; Kidney disease in his mother; Stroke in his mother.  Social history:  reports that he quit smoking about 32 years ago. His smoking use included cigarettes. He started smoking about 52 years ago. He has a 30 pack-year smoking history. He quit smokeless tobacco use about 33 years ago. He reports current alcohol use. He reports that he does not use drugs.    Review of Systems   Constitutional:  Negative for chills and fever.   Genitourinary:  Negative for hematuria.        Physical Exam  Vitals and nursing note reviewed.   Constitutional:       General: He is not in acute distress.     Appearance: Normal appearance. He is well-developed. He is not ill-appearing.      Comments: Ambulates without difficulty   Cardiovascular:      Rate and Rhythm: Normal rate.   Pulmonary:      Effort: Pulmonary effort is normal.      Breath sounds: Normal air entry.   Musculoskeletal:         General: Normal range of motion.   Skin:     General: Skin is warm and dry.   Neurological:      Mental Status: He is alert and oriented to person, place, and time.      Motor: Motor function is intact.   Psychiatric:         Mood and Affect: Mood normal.         Behavior: Behavior normal. Behavior is cooperative.         Thought Content: Thought content normal.         Judgment: Judgment normal.        Result Review :     PSA   Date Value Ref Range Status   02/07/2024 4.3 (H) 0.0 - 4.0 ng/mL Final     Comment:     Roche [employer] InternationalIA methodology.  According to the American Urological Association, Serum PSA should  decrease and remain at undetectable levels after radical  prostatectomy. The AUA defines biochemical  recurrence as an initial  PSA value 0.2 ng/mL or greater followed by a subsequent confirmatory  PSA value 0.2 ng/mL or greater.  Values obtained with different assay methods or kits cannot be used  interchangeably. Results cannot be interpreted as absolute evidence  of the presence or absence of malignant disease.   10/28/2022 3.580 0.000 - 4.000 ng/mL Final   09/07/2022 4.520 (H) 0.000 - 4.000 ng/mL Final         1 Patient Communication           Component  Ref Range & Units 1 mo ago  (2/7/24) 1 yr ago  (10/28/22) 1 yr ago  (9/7/22) 5 yr ago  (1/16/19) 6 yr ago  (4/11/17) 7 yr ago  (7/1/16)   PSA  0.0 - 4.0 ng/mL 4.3 High  3.580 R 4.520 High  R 2.010 R 1.970 R 1.700 R   Comment: Roche ECLIA methodology.  According to the American Urological Association, Serum PSA should  decrease and remain at undetectable levels after radical  prostatectomy. The AUA defines biochemical recurrence as an initial  PSA value 0.2 ng/mL or greater followed by a subsequent confirmatory  PSA value 0.2 ng/mL or greater.  Values obtained with different assay methods or kits cannot be used  interchangeably. Results cannot be interpreted as absolute evidence  of the presence or absence of malignant disease.   PSA, Free  N/A ng/mL 0.60        Comment: Roche ECLIA methodology.   PSA, Free %  % 14.0        Comment: The table below lists the probability of prostate cancer for  men with non-suspicious DANGELO results and total PSA between  4 and 10 ng/mL, by patient age (Kacey et al, ALEXEY 1998,  279:1542).                    % Free PSA       50-64 yr        65-75 yr                    0.00-10.00%        56%             55%                   10.01-15.00%        24%             35%                   15.01-20.00%        17%             23%                   20.01-25.00%        10%             20%                        >25.00%         5%              9%  Please note:  Kacey et al did not make specific                recommendations regarding the  use of                percent free PSA for any other population                of men.         Results for orders placed or performed in visit on 02/16/24   POC Urinalysis Dipstick, Automated    Specimen: Urine   Result Value Ref Range    Color Yellow Yellow, Straw, Dark Yellow, Candace    Clarity, UA Clear Clear    Specific Gravity  1.025 1.005 - 1.030    pH, Urine 5.5 5.0 - 8.0    Leukocytes Negative Negative    Nitrite, UA Negative Negative    Protein, POC Negative Negative mg/dL    Glucose,  mg/dL (A) Negative mg/dL    Ketones, UA Negative Negative    Urobilinogen, UA Normal Normal, 0.2 E.U./dL    Bilirubin Negative Negative    Blood, UA Trace (A) Negative    Lot Number [id number]     Expiration Date 11/30/24        MRI Prostate With & Without Contrast (03/23/2024 09:44)      Assessment and Plan    Diagnoses and all orders for this visit:    1. BPH with elevated PSA (Primary)  -     Cancel: POC Urinalysis Dipstick, Automated  -     Cystoscopy; Future    2. BPH with obstruction/lower urinary tract symptoms  -     Cancel: POC Urinalysis Dipstick, Automated  -     Cystoscopy; Future    3. History of prostatitis  -     Cystoscopy; Future      MRI of prostate results reviewed and discussed in detail with patient. No suspicious lesions per PIRADS criteria. Sequela of prostatitis. Imaging did note Trabeculated bladder  which was explained to patient commonly seen with bph and obstructive uropathy. Agree will do cystoscopry first then would be interested in beginning finasteride. No wanting to undergo surgical intervention     No suspicious lesions per PIRADS criteria. Patient is doing better on 2 tamsulosin.  Will schedule cystoscopy in office toward end of May to evaluate prostate and discussed with patient beginning finasteride as non surgical  treatment option for treating obstructive bph. Helps shrink prostate over time.  Will wait until cystoscopy and  may discuss at that time.  Understanding that finasteride  will alter psa level readings with prolonged use.         Follow Up   No follow-ups on file.  Patient was given instructions and counseling regarding his condition or for health maintenance advice. Please see specific information pulled into the AVS if appropriate.     Michell Brown APRN

## 2024-04-02 PROBLEM — N41.1 CHRONIC PROSTATITIS: Status: ACTIVE | Noted: 2024-04-02

## 2024-04-04 ENCOUNTER — TELEPHONE (OUTPATIENT)
Dept: FAMILY MEDICINE CLINIC | Facility: CLINIC | Age: 64
End: 2024-04-04
Payer: COMMERCIAL

## 2024-04-04 DIAGNOSIS — E66.01 CLASS 3 SEVERE OBESITY WITH SERIOUS COMORBIDITY AND BODY MASS INDEX (BMI) OF 40.0 TO 44.9 IN ADULT, UNSPECIFIED OBESITY TYPE: ICD-10-CM

## 2024-04-04 RX ORDER — SEMAGLUTIDE 0.5 MG/.5ML
0.5 INJECTION, SOLUTION SUBCUTANEOUS WEEKLY
Qty: 2 ML | Refills: 0 | Status: SHIPPED | OUTPATIENT
Start: 2024-04-04 | End: 2024-04-04 | Stop reason: SDUPTHER

## 2024-04-04 NOTE — TELEPHONE ENCOUNTER
PATIENT CAME INTO OFFICE THIS EVENING THINKING HE HAD AN APPOINTMENT WITH YOU, I NOTIFIED THAT HE DID HAVE AN APPOINTMENT ON 3/28 THAT HE NO SHOWED. PATIENT APOLOGIZED FOR THE CONFUSION AND RESCHEDULED TO YOUR NEXT AVAILABLE ON 4/16 BUT IS REQUESTING A COURTESY REFILL OF HIS WEGOVY TO BE SENT TO HIS MAIL ORDER PHARMACY. PATIENT STATED HE IS COMPLETELY OUT OF THIS MEDICATION. PLEASE ADVISE IF THIS CAN BE DONE.

## 2024-04-04 NOTE — TELEPHONE ENCOUNTER
Pt's weight today is 261.7  Patient came into just for a weight.  He was not seen in office today. He has a follow up on 04/16/24 with Gaurav.

## 2024-04-05 RX ORDER — SEMAGLUTIDE 0.5 MG/.5ML
0.5 INJECTION, SOLUTION SUBCUTANEOUS WEEKLY
Qty: 2 ML | Refills: 0 | Status: SHIPPED | OUTPATIENT
Start: 2024-04-05

## 2024-04-16 ENCOUNTER — OFFICE VISIT (OUTPATIENT)
Dept: FAMILY MEDICINE CLINIC | Facility: CLINIC | Age: 64
End: 2024-04-16
Payer: COMMERCIAL

## 2024-04-16 VITALS
BODY MASS INDEX: 38.06 KG/M2 | WEIGHT: 257 LBS | HEIGHT: 69 IN | OXYGEN SATURATION: 96 % | HEART RATE: 101 BPM | TEMPERATURE: 97.3 F | DIASTOLIC BLOOD PRESSURE: 72 MMHG | SYSTOLIC BLOOD PRESSURE: 118 MMHG

## 2024-04-16 DIAGNOSIS — E66.01 CLASS 2 SEVERE OBESITY WITH SERIOUS COMORBIDITY AND BODY MASS INDEX (BMI) OF 37.0 TO 37.9 IN ADULT, UNSPECIFIED OBESITY TYPE: Primary | ICD-10-CM

## 2024-04-16 PROCEDURE — 99213 OFFICE O/P EST LOW 20 MIN: CPT | Performed by: NURSE PRACTITIONER

## 2024-04-16 NOTE — PROGRESS NOTES
Chief Complaint  Obesity (Follow up on weight.)    Subjective            Ronnie Baron presents to [facility]  History of Present Illness  Patient is here for the 1 month follow-up visit regarding the Wegovy for weight loss    We initiated Wegovy 0.25 mg once weekly March 7-last month-and then 1 week ago patient had contacted me and reported doing well and we had went ahead and increased his dose to Wegovy 0.50 mg once weekly    Also with Hx of severe OA of his joints and then also the fatty liver Dz as well     Patient's weight was 284 in February and at the March 7 visit he was down to 277 pounds and then March 1 he was down to 272## and they will he is down to 257 pounds therefore patient has lost a total of 27 pounds since February    Tolerating it well--and at times still has some cravings for foods--no N/V/D --no lumps in the throat--and some constipation and then stool softener or laxative       PHQ-2 Total Score:    PHQ-9 Total Score:      Past Medical History:   Diagnosis Date    Alteration in appetite     Anxiety     Chronic prostatitis 4/2/2024    CKD (chronic kidney disease) stage 3, GFR 30-59 ml/min     Depression     severe recurrent major depression with psychotic features    DVT (deep venous thrombosis)     Elevated PSA 09/22/22    Enlarged prostate on rectal examination 09/26/2022    Hyperglycemia     Hyperlipidemia     OCD (obsessive compulsive disorder)     TALON on AUTOCPAP 02/26/2019    Moderate severity TALON with AHI 20 events per hour.  Sleep related hypoxia present.    Renal insufficiency     Tachycardia     Vitamin D deficiency        No Known Allergies     Past Surgical History:   Procedure Laterality Date    ELBOW PROCEDURE  06/2014        Social History     Tobacco Use    Smoking status: Former     Current packs/day: 0.00     Average packs/day: 1 pack/day for 30.0 years (30.0 ttl pk-yrs)     Types: Cigarettes     Start date: 1/1/1972     Quit date: 1/1/1992      Years since quittin.3    Smokeless tobacco: Former     Quit date: 1990   Vaping Use    Vaping status: Never Used   Substance Use Topics    Alcohol use: Yes     Comment: rare occasional just pleasure  none since 2016    Drug use: No       Family History   Problem Relation Age of Onset    Hypertension Mother     Stroke Mother         2016 with two blood clots    Depression Mother         takes welbutrin    Kidney disease Mother     Hypertension Father     Depression Father         takes medicine care give of wife    Heart disease Maternal Grandfather     Cancer Maternal Grandmother         Health Maintenance Due   Topic Date Due    ANNUAL PHYSICAL  Never done    RSV Vaccine - Adults (1 - 1-dose 60+ series) Never done    COVID-19 Vaccine ( season) Never done        Current Outpatient Medications on File Prior to Visit   Medication Sig    aspirin 81 MG EC tablet Take 1 tablet by mouth Daily.    clomiPRAMINE (ANAFRANIL) 50 MG capsule     Farxiga 10 MG tablet Take 10 mg by mouth Daily.    fluvoxaMINE (LUVOX) 100 MG tablet     gabapentin (NEURONTIN) 600 MG tablet take 1 tablet by mouth TWICE a day    levothyroxine (Synthroid) 50 MCG tablet Take 1 tablet by mouth Every Morning.    LORazepam (ATIVAN) 0.5 MG tablet     Multiple Vitamins-Minerals (MENS MULTI VITAMIN & MINERAL PO) Take  by mouth.    OLANZapine (zyPREXA) 5 MG tablet     rosuvastatin (CRESTOR) 10 MG tablet Take 1 tablet by mouth Daily. for cholesterol    Semaglutide-Weight Management (Wegovy) 0.5 MG/0.5ML solution auto-injector Inject 0.5 mL under the skin into the appropriate area as directed 1 (One) Time Per Week.    tamsulosin (FLOMAX) 0.4 MG capsule 24 hr capsule Take 2 capsules by mouth Every Evening. 30 minutes after meal    traZODone (DESYREL) 50 MG tablet     Vitamin D, Cholecalciferol, 1000 UNITS tablet Take 2 tablets by mouth Daily.     No current facility-administered medications on file prior to visit.  "      Immunization History   Administered Date(s) Administered    Shingrix 07/17/2019, 10/23/2019    Td (TDVAX) 07/06/1998    Tdap 07/01/2016       Review of Systems   Constitutional:  Negative for fatigue, unexpected weight gain and unexpected weight loss.   HENT:  Negative for trouble swallowing.    Respiratory:  Negative for shortness of breath.    Cardiovascular:  Negative for chest pain.   Gastrointestinal:  Positive for constipation. Negative for abdominal pain, diarrhea, nausea and vomiting.   Genitourinary:  Negative for dysuria.   Neurological:  Negative for dizziness, syncope and light-headedness.   Hematological:  Negative for adenopathy.        Objective     /72   Pulse 101   Temp 97.3 °F (36.3 °C) (Temporal)   Ht 175.3 cm (69\")   Wt 117 kg (257 lb)   SpO2 96%   BMI 37.95 kg/m²       Physical Exam  Vitals and nursing note reviewed. Exam conducted with a chaperone present (wife).   Constitutional:       Appearance: Normal appearance.   HENT:      Head: Normocephalic.      Right Ear: External ear normal.      Left Ear: External ear normal.      Nose: Nose normal.      Mouth/Throat:      Mouth: Mucous membranes are moist.   Eyes:      Pupils: Pupils are equal, round, and reactive to light.   Cardiovascular:      Rate and Rhythm: Normal rate and regular rhythm.      Heart sounds: Normal heart sounds.   Pulmonary:      Effort: Pulmonary effort is normal.      Breath sounds: Normal breath sounds.   Abdominal:      Palpations: Abdomen is soft.   Musculoskeletal:         General: Normal range of motion.      Cervical back: Normal range of motion and neck supple.   Skin:     General: Skin is warm and dry.   Neurological:      Mental Status: He is alert and oriented to person, place, and time.   Psychiatric:         Mood and Affect: Mood normal.         Behavior: Behavior normal.         Thought Content: Thought content normal.         Judgment: Judgment normal.         Result Review :              "              Assessment and Plan      Diagnoses and all orders for this visit:    1. Class 2 severe obesity with serious comorbidity and body mass index (BMI) of 37.0 to 37.9 in adult, unspecified obesity type (Primary)    We will have him F/U at 16 days and then that way he will not run out and then already took the 1st week of the 0.5 mg dose and they are leaving for vacation     Also make sure always goes through [facility] mail order         Follow Up     Return in about 16 days (around 5/2/2024), or if symptoms worsen or fail to improve, for Recheck.    Patient was given instructions and counseling regarding his condition or for health maintenance advice. Please see specific information pulled into the AVS if appropriate.            Ronnie Baron  reports that he quit smoking about 32 years ago. His smoking use included cigarettes. He started smoking about 52 years ago. He has a 30 pack-year smoking history. He quit smokeless tobacco use about 33 years ago. I have educated him on the risk of diseases from using tobacco products such as cancer, COPD, and heart disease.                Answers submitted by the patient for this visit:  Primary Reason for Visit (Submitted on 4/14/2024)  What is the primary reason for your visit?: Other  Other (Submitted on 4/14/2024)  Please describe your symptoms.: Med refill  Have you had these symptoms before?: No  How long have you been having these symptoms?: Greater than 2 weeks  Please list any medications you are currently taking for this condition.: Alicia

## 2024-05-01 ENCOUNTER — OFFICE VISIT (OUTPATIENT)
Dept: FAMILY MEDICINE CLINIC | Facility: CLINIC | Age: 64
End: 2024-05-01
Payer: COMMERCIAL

## 2024-05-01 VITALS
OXYGEN SATURATION: 96 % | BODY MASS INDEX: 37.92 KG/M2 | HEART RATE: 102 BPM | TEMPERATURE: 97.4 F | SYSTOLIC BLOOD PRESSURE: 118 MMHG | DIASTOLIC BLOOD PRESSURE: 64 MMHG | HEIGHT: 69 IN | WEIGHT: 256 LBS

## 2024-05-01 DIAGNOSIS — E66.01 CLASS 2 SEVERE OBESITY WITH SERIOUS COMORBIDITY AND BODY MASS INDEX (BMI) OF 37.0 TO 37.9 IN ADULT, UNSPECIFIED OBESITY TYPE: Primary | ICD-10-CM

## 2024-05-01 PROCEDURE — 99213 OFFICE O/P EST LOW 20 MIN: CPT | Performed by: NURSE PRACTITIONER

## 2024-05-01 RX ORDER — SEMAGLUTIDE 1 MG/.5ML
1 INJECTION, SOLUTION SUBCUTANEOUS WEEKLY
Qty: 2 ML | Refills: 0 | Status: SHIPPED | OUTPATIENT
Start: 2024-05-01

## 2024-05-01 NOTE — PROGRESS NOTES
Chief Complaint  Obesity (Medication refill. )    Subjective            Ronnie Baron presents to [facility]  History of Present Illness  Patient is here today for follow-up with regards to the weight loss injection in the obesity     we initiated Wegovy 0.25 mg once weekly March 7-last month-and then 1 week ago patient had contacted me and reported doing well and we had went ahead and increased his dose to Wegovy 0.50 mg once weekly--and currently on the same dose of 0.5 mg wegovy weekly injection      Also with Hx of severe OA of his joints and then also the fatty liver Dz as well      Patient's weight was 284 in February and at the March 7 visit he was down to 277 pounds and then March 1 he was down to 272## and the weight was down to 257 pounds therefore patient has lost a total of 27 pounds since February--and today he is now at 256# and total weight loss of 28 # and is currently on the      Tolerating it well--and at times still has some cravings for foods--no N/V/D --no lumps in the throat--and some constipation and then stool softener or laxative         PHQ-2 Total Score:    PHQ-9 Total Score:      Past Medical History:   Diagnosis Date    Alteration in appetite     Anxiety     Chronic prostatitis 4/2/2024    CKD (chronic kidney disease) stage 3, GFR 30-59 ml/min     Depression     severe recurrent major depression with psychotic features    DVT (deep venous thrombosis)     Elevated PSA 09/22/22    Enlarged prostate on rectal examination 09/26/2022    Hyperglycemia     Hyperlipidemia     OCD (obsessive compulsive disorder)     TALON on AUTOCPAP 02/26/2019    Moderate severity TALON with AHI 20 events per hour.  Sleep related hypoxia present.    Renal insufficiency     Tachycardia     Vitamin D deficiency        No Known Allergies     Past Surgical History:   Procedure Laterality Date    ELBOW PROCEDURE  06/2014        Social History     Tobacco Use    Smoking status: Former      Current packs/day: 0.00     Average packs/day: 1 pack/day for 30.0 years (30.0 ttl pk-yrs)     Types: Cigarettes     Start date: 1972     Quit date: 1992     Years since quittin.3    Smokeless tobacco: Former     Quit date: 1990   Vaping Use    Vaping status: Never Used   Substance Use Topics    Alcohol use: Yes     Comment: rare occasional just pleasure  none since 2016    Drug use: No       Family History   Problem Relation Age of Onset    Hypertension Mother     Stroke Mother         2016 with two blood clots    Depression Mother         takes welbutrin    Kidney disease Mother     Hypertension Father     Depression Father         takes medicine care give of wife    Heart disease Maternal Grandfather     Cancer Maternal Grandmother         Health Maintenance Due   Topic Date Due    ANNUAL PHYSICAL  Never done    RSV Vaccine - Adults ( - -dose 60+ series) Never done    COVID-19 Vaccine ( season) Never done        Current Outpatient Medications on File Prior to Visit   Medication Sig    aspirin 81 MG EC tablet Take 1 tablet by mouth Daily.    clomiPRAMINE (ANAFRANIL) 50 MG capsule     Farxiga 10 MG tablet Take 10 mg by mouth Daily.    fluvoxaMINE (LUVOX) 100 MG tablet     gabapentin (NEURONTIN) 600 MG tablet take 1 tablet by mouth TWICE a day    levothyroxine (Synthroid) 50 MCG tablet Take 1 tablet by mouth Every Morning.    LORazepam (ATIVAN) 0.5 MG tablet     Multiple Vitamins-Minerals (MENS MULTI VITAMIN & MINERAL PO) Take  by mouth.    OLANZapine (zyPREXA) 5 MG tablet     rosuvastatin (CRESTOR) 10 MG tablet Take 1 tablet by mouth Daily. for cholesterol    tamsulosin (FLOMAX) 0.4 MG capsule 24 hr capsule Take 2 capsules by mouth Every Evening. 30 minutes after meal    traZODone (DESYREL) 50 MG tablet     Vitamin D, Cholecalciferol, 1000 UNITS tablet Take 2 tablets by mouth Daily.    [DISCONTINUED] Semaglutide-Weight Management (Wegovy) 0.5 MG/0.5ML solution  "auto-injector Inject 0.5 mL under the skin into the appropriate area as directed 1 (One) Time Per Week.     No current facility-administered medications on file prior to visit.       Immunization History   Administered Date(s) Administered    Shingrix 07/17/2019, 10/23/2019    Td (TDVAX) 07/06/1998    Tdap 07/01/2016       Review of Systems   Constitutional:  Negative for fatigue, unexpected weight gain and unexpected weight loss.   HENT:  Negative for trouble swallowing.    Respiratory:  Negative for shortness of breath.    Cardiovascular:  Negative for chest pain.   Gastrointestinal:  Positive for constipation. Negative for abdominal pain, blood in stool, nausea and vomiting.   Neurological:  Negative for dizziness, syncope and light-headedness.   Hematological:  Negative for adenopathy.        Objective     /64   Pulse 102   Temp 97.4 °F (36.3 °C) (Temporal)   Ht 175.3 cm (69\")   Wt 116 kg (256 lb)   SpO2 96%   BMI 37.80 kg/m²       Physical Exam  Vitals and nursing note reviewed. Exam conducted with a chaperone present (wife).   Constitutional:       Appearance: Normal appearance.   HENT:      Head: Normocephalic.      Right Ear: External ear normal.      Left Ear: External ear normal.      Nose: Nose normal.      Mouth/Throat:      Mouth: Mucous membranes are moist.   Eyes:      Pupils: Pupils are equal, round, and reactive to light.   Cardiovascular:      Rate and Rhythm: Normal rate and regular rhythm.      Heart sounds: Normal heart sounds.   Pulmonary:      Effort: Pulmonary effort is normal.      Breath sounds: Normal breath sounds.   Abdominal:      Palpations: Abdomen is soft.   Musculoskeletal:         General: Normal range of motion.      Cervical back: Normal range of motion and neck supple.   Skin:     General: Skin is warm and dry.   Neurological:      Mental Status: He is alert and oriented to person, place, and time.   Psychiatric:         Mood and Affect: Mood normal.         " Behavior: Behavior normal.         Thought Content: Thought content normal.         Judgment: Judgment normal.         Result Review :                           Assessment and Plan      Diagnoses and all orders for this visit:    1. Class 2 severe obesity with serious comorbidity and body mass index (BMI) of 37.0 to 37.9 in adult, unspecified obesity type (Primary)  -     Semaglutide-Weight Management (Wegovy) 1 MG/0.5ML solution auto-injector; Inject 0.5 mL under the skin into the appropriate area as directed 1 (One) Time Per Week.  Dispense: 2 mL; Refill: 0    We will increase the Wegovy to 1 mg weekly patient thus far has tolerated the prior doses very well with no side effects no nausea no vomiting very minimal constipation that if he increases his water improves tremendously and even resolves he feels more energetic and his body mass index was greater than 40 and he is now in the BMI of 37 today        Follow Up     Return in about 4 weeks (around 5/29/2024), or if symptoms worsen or fail to improve, for Recheck.    Patient was given instructions and counseling regarding his condition or for health maintenance advice. Please see specific information pulled into the AVS if appropriate.            Ronnie Baron  reports that he quit smoking about 32 years ago. His smoking use included cigarettes. He started smoking about 52 years ago. He has a 30 pack-year smoking history. He quit smokeless tobacco use about 33 years ago. I have educated him on the risk of diseases from using tobacco products such as cancer, COPD, and heart disease.              Answers submitted by the patient for this visit:  Primary Reason for Visit (Submitted on 4/29/2024)  What is the primary reason for your visit?: Other  Other (Submitted on 4/29/2024)  Please describe your symptoms.: Medicine refill  Have you had these symptoms before?: No  How long have you been having these symptoms?: Greater than 2 weeks

## 2024-05-24 ENCOUNTER — PROCEDURE VISIT (OUTPATIENT)
Dept: UROLOGY | Facility: CLINIC | Age: 64
End: 2024-05-24
Payer: COMMERCIAL

## 2024-05-24 DIAGNOSIS — N40.1 BPH WITH OBSTRUCTION/LOWER URINARY TRACT SYMPTOMS: Primary | ICD-10-CM

## 2024-05-24 DIAGNOSIS — R97.20 ELEVATED PROSTATE SPECIFIC ANTIGEN (PSA): ICD-10-CM

## 2024-05-24 DIAGNOSIS — N13.8 BPH WITH OBSTRUCTION/LOWER URINARY TRACT SYMPTOMS: Primary | ICD-10-CM

## 2024-05-24 LAB
BILIRUB BLD-MCNC: NEGATIVE MG/DL
CLARITY, POC: CLEAR
COLOR UR: YELLOW
EXPIRATION DATE: ABNORMAL
GLUCOSE UR STRIP-MCNC: ABNORMAL MG/DL
KETONES UR QL: NEGATIVE
LEUKOCYTE EST, POC: NEGATIVE
Lab: ABNORMAL
NITRITE UR-MCNC: NEGATIVE MG/ML
PH UR: 6 [PH] (ref 5–8)
PROT UR STRIP-MCNC: NEGATIVE MG/DL
RBC # UR STRIP: NEGATIVE /UL
SP GR UR: 1.01 (ref 1–1.03)
UROBILINOGEN UR QL: ABNORMAL

## 2024-05-24 NOTE — PROGRESS NOTES
Cystoscopy    Date/Time: 5/24/2024 9:09 AM    Performed by: Angela Kennedy MD  Authorized by: Angela Kennedy MD  Preparation: Patient was prepped and draped in the usual sterile fashion.  Local anesthesia used: yes    Anesthesia:  Local anesthesia used: yes  Local Anesthetic: topical anesthetic  Anesthetic total: 12 mL    Sedation:  Patient sedated: no        Indication.  BPH with obstructive uropathy.    Elevated PSA.    Patient was placed in lithotomy position.  Thorough scrubbing of lower abdomen and external genitalia was performed with Hibiclens.  18 Olympus flexible cystoscope was inserted into the urethra, which was normal.  Prostate gland is large and obstructive.  Patient has large lateral lobes and no significant median lobe.  Bladder is trabeculated.  Patient has a small diverticulum at the dome of urinary bladder.  Both ureteral orifices are normal.  Bladder was looked at carefully and I do not see any bladder tumors.  There was no evidence of vesicocolic fistula.  Flexible cystoscope was removed and patient tolerated the procedure very well.    Uroflow.    Q-Nilton.  14.7 mL/s    Average flow.  7.2 mL/s    Voided volume.  318 mL    Interval.  6  Bladder scan for residual.  3.5 MHz transducer was applied in the suprapubic area and fluid in the bladder was calculated which was only about 7 mL.    Patient has obstructive uropathy.  I am going to refer him to Dr. Patrick for possible aqua ablation

## 2024-05-29 ENCOUNTER — OFFICE VISIT (OUTPATIENT)
Dept: FAMILY MEDICINE CLINIC | Facility: CLINIC | Age: 64
End: 2024-05-29
Payer: COMMERCIAL

## 2024-05-29 VITALS
WEIGHT: 247 LBS | TEMPERATURE: 97.3 F | DIASTOLIC BLOOD PRESSURE: 72 MMHG | SYSTOLIC BLOOD PRESSURE: 120 MMHG | HEIGHT: 69 IN | HEART RATE: 111 BPM | BODY MASS INDEX: 36.58 KG/M2 | OXYGEN SATURATION: 95 %

## 2024-05-29 DIAGNOSIS — E66.01 CLASS 2 SEVERE OBESITY WITH SERIOUS COMORBIDITY AND BODY MASS INDEX (BMI) OF 37.0 TO 37.9 IN ADULT, UNSPECIFIED OBESITY TYPE: Primary | ICD-10-CM

## 2024-05-29 DIAGNOSIS — K59.00 CONSTIPATION, UNSPECIFIED CONSTIPATION TYPE: ICD-10-CM

## 2024-05-29 PROCEDURE — 99213 OFFICE O/P EST LOW 20 MIN: CPT | Performed by: NURSE PRACTITIONER

## 2024-05-29 RX ORDER — SEMAGLUTIDE 1 MG/.5ML
1 INJECTION, SOLUTION SUBCUTANEOUS WEEKLY
Qty: 2 ML | Refills: 0 | Status: SHIPPED | OUTPATIENT
Start: 2024-05-29

## 2024-05-29 NOTE — PROGRESS NOTES
Chief Complaint  Obesity (Medication refill)    Subjective            Ronnie Baron presents to [facility]  History of Present Illness  Pt is here for the obesity medication F/U--and in the past 1 month lost 9# and this makes a total of 37# lost--and this was even in spite of going on vacation and still did very well--and no SE no issues expect pt does reports some constipation--and at times goes up to 5 days without a BM--and no abd. pain and no N/V/D---and pt reports that if he takes a laxative at times helps and other times does not help--and tried OTC meds--and wife with him reports but will not try the miralax and has not dos the stool softener regularly--also staying hydrated as well--and pt also noted the more active he is the better the constipation is     Also they reports [facility]  mail [facility] will not be supplying the wegovy any longer and they will have to try [facility] or [facility] --he does have 1 more shot left     Obesity  Pertinent negatives include no abdominal pain, chest pain, fatigue, nausea or vomiting.       PHQ-2 Total Score: 0  PHQ-9 Total Score: 0    Past Medical History:   Diagnosis Date    Alteration in appetite     Anxiety     Chronic prostatitis 4/2/2024    CKD (chronic kidney disease) stage 3, GFR 30-59 ml/min     Depression     severe recurrent major depression with psychotic features    DVT (deep venous thrombosis)     Elevated PSA 09/22/22    Enlarged prostate on rectal examination 09/26/2022    Hyperglycemia     Hyperlipidemia     OCD (obsessive compulsive disorder)     TALON on AUTOCPAP 02/26/2019    Moderate severity TALON with AHI 20 events per hour.  Sleep related hypoxia present.    Renal insufficiency     Tachycardia     Vitamin D deficiency        No Known Allergies     Past Surgical History:   Procedure Laterality Date    ELBOW PROCEDURE  06/2014        Social History     Tobacco Use    Smoking status: Former     Current packs/day: 0.00      Average packs/day: 1 pack/day for 30.0 years (30.0 ttl pk-yrs)     Types: Cigarettes     Start date: 1972     Quit date: 1992     Years since quittin.4    Smokeless tobacco: Former     Quit date: 1990   Vaping Use    Vaping status: Never Used   Substance Use Topics    Alcohol use: Yes     Comment: rare occasional just pleasure  none since 2016    Drug use: No       Family History   Problem Relation Age of Onset    Hypertension Mother     Stroke Mother         2016 with two blood clots    Depression Mother         takes welbutrin    Kidney disease Mother     Hypertension Father     Depression Father         takes medicine care give of wife    Heart disease Maternal Grandfather     Cancer Maternal Grandmother         Health Maintenance Due   Topic Date Due    ANNUAL PHYSICAL  Never done    RSV Vaccine - Adults (1 - 1-dose 60+ series) Never done    COVID-19 Vaccine ( season) Never done        Current Outpatient Medications on File Prior to Visit   Medication Sig    aspirin 81 MG EC tablet Take 1 tablet by mouth Daily.    clomiPRAMINE (ANAFRANIL) 50 MG capsule     Farxiga 10 MG tablet Take 10 mg by mouth Daily.    fluvoxaMINE (LUVOX) 100 MG tablet     gabapentin (NEURONTIN) 600 MG tablet take 1 tablet by mouth TWICE a day    levothyroxine (Synthroid) 50 MCG tablet Take 1 tablet by mouth Every Morning.    LORazepam (ATIVAN) 0.5 MG tablet     Multiple Vitamins-Minerals (MENS MULTI VITAMIN & MINERAL PO) Take  by mouth.    OLANZapine (zyPREXA) 5 MG tablet     rosuvastatin (CRESTOR) 10 MG tablet Take 1 tablet by mouth Daily. for cholesterol    tamsulosin (FLOMAX) 0.4 MG capsule 24 hr capsule Take 2 capsules by mouth Every Evening. 30 minutes after meal    traZODone (DESYREL) 50 MG tablet     Vitamin D, Cholecalciferol, 1000 UNITS tablet Take 2 tablets by mouth Daily.    [DISCONTINUED] Semaglutide-Weight Management (Wegovy) 1 MG/0.5ML solution auto-injector Inject 0.5 mL under the  "skin into the appropriate area as directed 1 (One) Time Per Week.     No current facility-administered medications on file prior to visit.       Immunization History   Administered Date(s) Administered    Shingrix 07/17/2019, 10/23/2019    Td (TDVAX) 07/06/1998    Tdap 07/01/2016       Review of Systems   Constitutional:  Negative for fatigue, unexpected weight gain and unexpected weight loss.   HENT:  Negative for trouble swallowing.    Respiratory:  Negative for shortness of breath.    Cardiovascular:  Negative for chest pain.   Gastrointestinal:  Positive for constipation. Negative for abdominal pain, diarrhea, nausea and vomiting.   Genitourinary:  Negative for dysuria.   Neurological:  Positive for light-headedness. Negative for dizziness, seizures and syncope.        At times with getting up fast will get a little lightheadedness    Hematological:  Negative for adenopathy.        Objective     /72   Pulse 111   Temp 97.3 °F (36.3 °C) (Temporal)   Ht 175.3 cm (69\")   Wt 112 kg (247 lb)   SpO2 95%   BMI 36.48 kg/m²       Physical Exam  Vitals and nursing note reviewed. Exam conducted with a chaperone present (wife).   Constitutional:       Appearance: Normal appearance.      Comments: Lost additional 9# this time and total of 37#    HENT:      Head: Normocephalic.      Right Ear: External ear normal.      Left Ear: External ear normal.      Nose: Nose normal.      Mouth/Throat:      Mouth: Mucous membranes are moist.   Eyes:      Pupils: Pupils are equal, round, and reactive to light.   Cardiovascular:      Rate and Rhythm: Normal rate and regular rhythm.      Heart sounds: Normal heart sounds.   Pulmonary:      Effort: Pulmonary effort is normal.      Breath sounds: Normal breath sounds.   Abdominal:      Palpations: Abdomen is soft.      Tenderness: There is no abdominal tenderness.   Musculoskeletal:         General: Normal range of motion.      Cervical back: Normal range of motion and neck " supple.   Skin:     General: Skin is warm and dry.   Neurological:      Mental Status: He is alert and oriented to person, place, and time.   Psychiatric:         Mood and Affect: Mood normal.         Behavior: Behavior normal.         Thought Content: Thought content normal.         Judgment: Judgment normal.         Result Review :                           Assessment and Plan      Diagnoses and all orders for this visit:    1. Class 2 severe obesity with serious comorbidity and body mass index (BMI) of 37.0 to 37.9 in adult, unspecified obesity type (Primary)  -     Semaglutide-Weight Management (Wegovy) 1 MG/0.5ML solution auto-injector; Inject 0.5 mL under the skin into the appropriate area as directed 1 (One) Time Per Week.  Dispense: 2 mL; Refill: 0    2. Constipation, unspecified constipation type  Comments:  encouraged stool softener    We will see if zak has this dose and if his insurance will cover well --they will reach out to me if any issues --we are keeping him on the same dose for now--        Follow Up     Return in about 4 weeks (around 6/26/2024), or if symptoms worsen or fail to improve, for Recheck.    Patient was given instructions and counseling regarding his condition or for health maintenance advice. Please see specific information pulled into the AVS if appropriate.            Ronnie CARDENAS Tod  reports that he quit smoking about 32 years ago. His smoking use included cigarettes. He started smoking about 52 years ago. He has a 30 pack-year smoking history. He quit smokeless tobacco use about 33 years ago. I have educated him on the risk of diseases from using tobacco products such as cancer, COPD, and heart disease.

## 2024-06-05 ENCOUNTER — OFFICE VISIT (OUTPATIENT)
Dept: FAMILY MEDICINE CLINIC | Facility: CLINIC | Age: 64
End: 2024-06-05
Payer: COMMERCIAL

## 2024-06-05 VITALS
BODY MASS INDEX: 36.43 KG/M2 | HEART RATE: 103 BPM | DIASTOLIC BLOOD PRESSURE: 58 MMHG | SYSTOLIC BLOOD PRESSURE: 110 MMHG | OXYGEN SATURATION: 94 % | HEIGHT: 69 IN | WEIGHT: 246 LBS | TEMPERATURE: 97.3 F

## 2024-06-05 DIAGNOSIS — R73.03 PREDIABETES: ICD-10-CM

## 2024-06-05 DIAGNOSIS — R55 SYNCOPE, UNSPECIFIED SYNCOPE TYPE: Primary | ICD-10-CM

## 2024-06-05 DIAGNOSIS — I51.7 MILD CARDIOMEGALY: ICD-10-CM

## 2024-06-05 DIAGNOSIS — N18.31 STAGE 3A CHRONIC KIDNEY DISEASE: ICD-10-CM

## 2024-06-05 LAB
ALBUMIN SERPL-MCNC: 4.3 G/DL (ref 3.5–5.2)
ALBUMIN/GLOB SERPL: 1.4 G/DL
ALP SERPL-CCNC: 149 U/L (ref 39–117)
ALT SERPL W P-5'-P-CCNC: 22 U/L (ref 1–41)
ANION GAP SERPL CALCULATED.3IONS-SCNC: 9 MMOL/L (ref 5–15)
AST SERPL-CCNC: 12 U/L (ref 1–40)
BASOPHILS # BLD AUTO: 0.05 10*3/MM3 (ref 0–0.2)
BASOPHILS NFR BLD AUTO: 0.8 % (ref 0–1.5)
BILIRUB SERPL-MCNC: 0.2 MG/DL (ref 0–1.2)
BUN SERPL-MCNC: 20 MG/DL (ref 8–23)
BUN/CREAT SERPL: 13.3 (ref 7–25)
CALCIUM SPEC-SCNC: 9.3 MG/DL (ref 8.6–10.5)
CHLORIDE SERPL-SCNC: 103 MMOL/L (ref 98–107)
CO2 SERPL-SCNC: 28 MMOL/L (ref 22–29)
CREAT SERPL-MCNC: 1.5 MG/DL (ref 0.76–1.27)
DEPRECATED RDW RBC AUTO: 45.4 FL (ref 37–54)
EGFRCR SERPLBLD CKD-EPI 2021: 52.3 ML/MIN/1.73
EOSINOPHIL # BLD AUTO: 0.02 10*3/MM3 (ref 0–0.4)
EOSINOPHIL NFR BLD AUTO: 0.3 % (ref 0.3–6.2)
ERYTHROCYTE [DISTWIDTH] IN BLOOD BY AUTOMATED COUNT: 13.4 % (ref 12.3–15.4)
FOLATE SERPL-MCNC: >20 NG/ML (ref 4.78–24.2)
GLOBULIN UR ELPH-MCNC: 3 GM/DL
GLUCOSE SERPL-MCNC: 97 MG/DL (ref 65–99)
HBA1C MFR BLD: 5.8 % (ref 4.8–5.6)
HCT VFR BLD AUTO: 50.2 % (ref 37.5–51)
HGB BLD-MCNC: 16.6 G/DL (ref 13–17.7)
IMM GRANULOCYTES # BLD AUTO: 0.02 10*3/MM3 (ref 0–0.05)
IMM GRANULOCYTES NFR BLD AUTO: 0.3 % (ref 0–0.5)
LYMPHOCYTES # BLD AUTO: 1.19 10*3/MM3 (ref 0.7–3.1)
LYMPHOCYTES NFR BLD AUTO: 19 % (ref 19.6–45.3)
MAGNESIUM SERPL-MCNC: 2.8 MG/DL (ref 1.6–2.4)
MCH RBC QN AUTO: 30.1 PG (ref 26.6–33)
MCHC RBC AUTO-ENTMCNC: 33.1 G/DL (ref 31.5–35.7)
MCV RBC AUTO: 90.9 FL (ref 79–97)
MONOCYTES # BLD AUTO: 0.36 10*3/MM3 (ref 0.1–0.9)
MONOCYTES NFR BLD AUTO: 5.8 % (ref 5–12)
NEUTROPHILS NFR BLD AUTO: 4.61 10*3/MM3 (ref 1.7–7)
NEUTROPHILS NFR BLD AUTO: 73.8 % (ref 42.7–76)
NRBC BLD AUTO-RTO: 0 /100 WBC (ref 0–0.2)
PLATELET # BLD AUTO: 224 10*3/MM3 (ref 140–450)
PMV BLD AUTO: 11.1 FL (ref 6–12)
POTASSIUM SERPL-SCNC: 5 MMOL/L (ref 3.5–5.2)
PROT SERPL-MCNC: 7.3 G/DL (ref 6–8.5)
RBC # BLD AUTO: 5.52 10*6/MM3 (ref 4.14–5.8)
SODIUM SERPL-SCNC: 140 MMOL/L (ref 136–145)
T4 FREE SERPL-MCNC: 1.34 NG/DL (ref 0.92–1.68)
TSH SERPL DL<=0.05 MIU/L-ACNC: 1.72 UIU/ML (ref 0.27–4.2)
VIT B12 BLD-MCNC: 570 PG/ML (ref 211–946)
WBC NRBC COR # BLD AUTO: 6.25 10*3/MM3 (ref 3.4–10.8)

## 2024-06-05 PROCEDURE — 83735 ASSAY OF MAGNESIUM: CPT | Performed by: NURSE PRACTITIONER

## 2024-06-05 PROCEDURE — 82746 ASSAY OF FOLIC ACID SERUM: CPT | Performed by: NURSE PRACTITIONER

## 2024-06-05 PROCEDURE — 84439 ASSAY OF FREE THYROXINE: CPT | Performed by: NURSE PRACTITIONER

## 2024-06-05 PROCEDURE — 83036 HEMOGLOBIN GLYCOSYLATED A1C: CPT | Performed by: NURSE PRACTITIONER

## 2024-06-05 PROCEDURE — 80050 GENERAL HEALTH PANEL: CPT | Performed by: NURSE PRACTITIONER

## 2024-06-05 PROCEDURE — 82607 VITAMIN B-12: CPT | Performed by: NURSE PRACTITIONER

## 2024-06-05 NOTE — PROGRESS NOTES
..  Venipuncture Blood Specimen Collection  Venipuncture performed in LT arm by Azul Hackett with good hemostasis. Patient tolerated the procedure well without complications.   06/05/24   Karissa James MA

## 2024-06-05 NOTE — PROGRESS NOTES
Chief Complaint  Syncope (He had a episode where he passed out in the Kitchen on Friday afternoon.  He has had a couple of episode where he feels like he is going to pass out. )    Subjective            Ronnie Baron presents to [facility]  History of Present Illness  Patient is here today with his wife and 2 grandsons today with regards to he did have an episode of syncope where he was out for approximately 30 seconds and he just sort of slumped down and did not fall and did not hit his head no actual head injury of any type wife is right there she checked his glucose it was 166 and he came to she had him to sit still for little while and then they did get a blood pressure cuff so that they could monitor that as well no chest pain shortness of breath and there were a couple of times that he did if he moves too quickly that he would start getting a little lightheaded and would have to sit down and patient does not have a history of hypertension nor is he treated for hypertension but he is on Farxiga 10 mg for chronic kidney disease treatment nephrology has him on this and he has lost approximately 40 pounds and has been working at this steadily and he may be having some hypotensive episodes since he is having the weight loss and I did discuss with the wife and the patient that he needs to discuss his upon his follow-up in a couple of weeks with nephrology and let him know what happened they may need to cut back on the Farxiga from 10 mg back to 5 mg          PHQ-2 Total Score:    PHQ-9 Total Score:      Past Medical History:   Diagnosis Date    Alteration in appetite     Anxiety     Chronic prostatitis 4/2/2024    CKD (chronic kidney disease) stage 3, GFR 30-59 ml/min     Depression     severe recurrent major depression with psychotic features    DVT (deep venous thrombosis)     Elevated PSA 09/22/22    Enlarged prostate on rectal examination 09/26/2022    Hyperglycemia      Hyperlipidemia     OCD (obsessive compulsive disorder)     TALON on AUTOCPAP 2019    Moderate severity TALON with AHI 20 events per hour.  Sleep related hypoxia present.    Renal insufficiency     Tachycardia     Vitamin D deficiency        No Known Allergies     Past Surgical History:   Procedure Laterality Date    ELBOW PROCEDURE  2014        Social History     Tobacco Use    Smoking status: Former     Current packs/day: 0.00     Average packs/day: 1 pack/day for 30.0 years (30.0 ttl pk-yrs)     Types: Cigarettes     Start date: 1972     Quit date: 1992     Years since quittin.4    Smokeless tobacco: Former     Quit date: 1990   Vaping Use    Vaping status: Never Used   Substance Use Topics    Alcohol use: Yes     Comment: rare occasional just pleasure  none since 2016    Drug use: No       Family History   Problem Relation Age of Onset    Hypertension Mother     Stroke Mother         2016 with two blood clots    Depression Mother         takes welbutrin    Kidney disease Mother     Hypertension Father     Depression Father         takes medicine care give of wife    Heart disease Maternal Grandfather     Cancer Maternal Grandmother         Health Maintenance Due   Topic Date Due    ANNUAL PHYSICAL  Never done    RSV Vaccine - Adults (1 - 1-dose 60+ series) Never done    COVID-19 Vaccine ( season) Never done        Current Outpatient Medications on File Prior to Visit   Medication Sig    aspirin 81 MG EC tablet Take 1 tablet by mouth Daily.    clomiPRAMINE (ANAFRANIL) 50 MG capsule     Farxiga 10 MG tablet Take 10 mg by mouth Daily.    fluvoxaMINE (LUVOX) 100 MG tablet     gabapentin (NEURONTIN) 600 MG tablet take 1 tablet by mouth TWICE a day    levothyroxine (Synthroid) 50 MCG tablet Take 1 tablet by mouth Every Morning.    LORazepam (ATIVAN) 0.5 MG tablet     Multiple Vitamins-Minerals (MENS MULTI VITAMIN & MINERAL PO) Take  by mouth.    OLANZapine (zyPREXA) 5 MG  "tablet     rosuvastatin (CRESTOR) 10 MG tablet Take 1 tablet by mouth Daily. for cholesterol    Semaglutide-Weight Management (Wegovy) 1 MG/0.5ML solution auto-injector Inject 0.5 mL under the skin into the appropriate area as directed 1 (One) Time Per Week.    tamsulosin (FLOMAX) 0.4 MG capsule 24 hr capsule Take 2 capsules by mouth Every Evening. 30 minutes after meal    traZODone (DESYREL) 50 MG tablet     Vitamin D, Cholecalciferol, 1000 UNITS tablet Take 2 tablets by mouth Daily.     No current facility-administered medications on file prior to visit.       Immunization History   Administered Date(s) Administered    Shingrix 07/17/2019, 10/23/2019    Td (TDVAX) 07/06/1998    Tdap 07/01/2016       Review of Systems   Constitutional:  Negative for fatigue, unexpected weight gain and unexpected weight loss.   Respiratory:  Negative for shortness of breath.    Cardiovascular:  Negative for chest pain.   Gastrointestinal:  Negative for abdominal pain and blood in stool.   Genitourinary:  Negative for dysuria.   Neurological:  Positive for syncope and light-headedness.        Objective     /58   Pulse 103   Temp 97.3 °F (36.3 °C) (Temporal)   Ht 175.3 cm (69\")   Wt 112 kg (246 lb)   SpO2 94%   BMI 36.33 kg/m²       Physical Exam  Vitals and nursing note reviewed. Exam conducted with a chaperone present (wife).   Constitutional:       Appearance: Normal appearance.   HENT:      Head: Normocephalic.      Right Ear: External ear normal.      Left Ear: External ear normal.      Nose: Nose normal.      Mouth/Throat:      Mouth: Mucous membranes are moist.   Eyes:      Pupils: Pupils are equal, round, and reactive to light.   Cardiovascular:      Rate and Rhythm: Normal rate and regular rhythm.      Heart sounds: Normal heart sounds.   Pulmonary:      Effort: Pulmonary effort is normal.      Breath sounds: Normal breath sounds.   Abdominal:      Palpations: Abdomen is soft.   Musculoskeletal:         General: " Normal range of motion.      Cervical back: Normal range of motion.   Skin:     General: Skin is warm and dry.   Neurological:      Mental Status: He is alert and oriented to person, place, and time.   Psychiatric:         Mood and Affect: Mood normal.         Behavior: Behavior normal.         Thought Content: Thought content normal.         Judgment: Judgment normal.         Result Review :     The following data was reviewed by: CONSUELO Coronel on 06/05/2024:    A1C Last 3 Results          12/15/2023    13:31   HGBA1C Last 3 Results   Hemoglobin A1C 6.8          Details          This result is from an external source.                      ECG 12 Lead    Date/Time: 6/5/2024 3:15 PM  Performed by: Marj Nolasco APRN    Authorized by: Marj Nolasco APRN  Comparison: compared with previous ECG from 1/24/2019  Similar to previous ECG  Rhythm: sinus rhythm  Rate: normal  Conduction: conduction normal  QRS axis: normal  Other findings comments: right axis deviation--unchanged    Clinical impression: non-specific ECG  Comments: Unchanged from the EKG in 2019               Assessment and Plan      Diagnoses and all orders for this visit:    1. Syncope, unspecified syncope type (Primary)  -     Comprehensive Metabolic Panel  -     Hemoglobin A1c  -     Magnesium  -     TSH+Free T4  -     CBC & Differential  -     Vitamin B12 & Folate  -     ECG 12 Lead  -     Ambulatory Referral to Cardiology    2. Prediabetes  -     Hemoglobin A1c    3. Stage 3a chronic kidney disease  -     Comprehensive Metabolic Panel    4. Mild cardiomegaly  -     Ambulatory Referral to Cardiology    No EKG changes compared to the EKG done today in the office comparative to the 1 in 2019 and although patient is normotensive and we have no idea if he became hypotensive during this episode or not we will obtain labs and we will go ahead and proceed with a referral to cardiology to just ensure that nothing else is going on that  could have been contributory to the syncopal episode    I did advise him to remain very well-hydrated and to make sure that he grazes throughout the day he does not have to eat large meals but does graze throughout the day making sure his blood sugars are not dropping and there was 1 A1c from from [facility] last year where his A1c was 6.8% and he was diabetic and prior to that patient is always been prediabetic we will go ahead and obtain an A1c as well today        Follow Up     Return if symptoms worsen or fail to improve.    Patient was given instructions and counseling regarding his condition or for health maintenance advice. Please see specific information pulled into the AVS if appropriate.            Ronnie Baron  reports that he quit smoking about 32 years ago. His smoking use included cigarettes. He started smoking about 52 years ago. He has a 30 pack-year smoking history. He quit smokeless tobacco use about 33 years ago. I have educated him on the risk of diseases from using tobacco products such as cancer, COPD, and heart disease.

## 2024-06-06 ENCOUNTER — PATIENT MESSAGE (OUTPATIENT)
Dept: FAMILY MEDICINE CLINIC | Facility: CLINIC | Age: 64
End: 2024-06-06
Payer: COMMERCIAL

## 2024-06-06 NOTE — TELEPHONE ENCOUNTER
From: Ronnie Baron  To: Marj Nolasco  Sent: 6/6/2024 11:21 AM EDT  Subject: Follow up from June 5 visit     So with all the lab work improving, guess we wait just wait for the  Cardiologist to call for an appointment?

## 2024-06-06 NOTE — PROGRESS NOTES
Please mail letter to patient stating    Ronnie blood counts were normal range; and the comprehensive panel shows glucose normal and normal electrolytes and all of the liver functions were normal with the exception of the alkaline phosphatase which dropped from 189 to 149 and then the chronic kidney disease stage III is very stable and actually the filtration rates/function of the kidneys was highest that it has been in over a year magnesium was fine it was a little bit elevated and then hemoglobin A1c dropped from 6.8% down to 5.8% showing very good diabetic control and vitamin B12 and folic acid and thyroid levels all normal range

## 2024-06-10 ENCOUNTER — TELEPHONE (OUTPATIENT)
Dept: FAMILY MEDICINE CLINIC | Facility: CLINIC | Age: 64
End: 2024-06-10
Payer: COMMERCIAL

## 2024-06-10 NOTE — TELEPHONE ENCOUNTER
Caller: Arielle Baron    Relationship: Emergency Contact    Best call back number: [phone]     What is the best time to reach you: ANYTIME     Who are you requesting to speak with (clinical staff, provider,  specific staff member): CLINICAL     What was the call regarding: PATIENT SPOUSE IS CALLING TO CHECK THE STATUS OF A REFERRAL FOR CARDIOLOGY.

## 2024-06-11 ENCOUNTER — TRANSCRIBE ORDERS (OUTPATIENT)
Dept: ADMINISTRATIVE | Facility: HOSPITAL | Age: 64
End: 2024-06-11
Payer: COMMERCIAL

## 2024-06-11 ENCOUNTER — LAB (OUTPATIENT)
Dept: LAB | Facility: HOSPITAL | Age: 64
End: 2024-06-11
Payer: COMMERCIAL

## 2024-06-11 DIAGNOSIS — N18.30 STAGE 3 CHRONIC KIDNEY DISEASE, UNSPECIFIED WHETHER STAGE 3A OR 3B CKD: ICD-10-CM

## 2024-06-11 DIAGNOSIS — N18.30 STAGE 3 CHRONIC KIDNEY DISEASE, UNSPECIFIED WHETHER STAGE 3A OR 3B CKD: Primary | ICD-10-CM

## 2024-06-11 LAB
ANION GAP SERPL CALCULATED.3IONS-SCNC: 10 MMOL/L (ref 5–15)
BASOPHILS # BLD AUTO: 0.05 10*3/MM3 (ref 0–0.2)
BASOPHILS NFR BLD AUTO: 0.6 % (ref 0–1.5)
BILIRUB UR QL STRIP: NEGATIVE
BUN SERPL-MCNC: 24 MG/DL (ref 8–23)
BUN/CREAT SERPL: 16 (ref 7–25)
CALCIUM SPEC-SCNC: 9.1 MG/DL (ref 8.6–10.5)
CHLORIDE SERPL-SCNC: 105 MMOL/L (ref 98–107)
CLARITY UR: CLEAR
CO2 SERPL-SCNC: 24 MMOL/L (ref 22–29)
COLOR UR: YELLOW
CREAT SERPL-MCNC: 1.5 MG/DL (ref 0.76–1.27)
DEPRECATED RDW RBC AUTO: 43.1 FL (ref 37–54)
EGFRCR SERPLBLD CKD-EPI 2021: 52.3 ML/MIN/1.73
EOSINOPHIL # BLD AUTO: 0.1 10*3/MM3 (ref 0–0.4)
EOSINOPHIL NFR BLD AUTO: 1.2 % (ref 0.3–6.2)
ERYTHROCYTE [DISTWIDTH] IN BLOOD BY AUTOMATED COUNT: 13.6 % (ref 12.3–15.4)
GLUCOSE SERPL-MCNC: 100 MG/DL (ref 65–99)
GLUCOSE UR STRIP-MCNC: ABNORMAL MG/DL
HCT VFR BLD AUTO: 51.1 % (ref 37.5–51)
HGB BLD-MCNC: 17.2 G/DL (ref 13–17.7)
HGB UR QL STRIP.AUTO: NEGATIVE
HOLD SPECIMEN: NORMAL
IMM GRANULOCYTES # BLD AUTO: 0.03 10*3/MM3 (ref 0–0.05)
IMM GRANULOCYTES NFR BLD AUTO: 0.4 % (ref 0–0.5)
KETONES UR QL STRIP: NEGATIVE
LEUKOCYTE ESTERASE UR QL STRIP.AUTO: NEGATIVE
LYMPHOCYTES # BLD AUTO: 2.96 10*3/MM3 (ref 0.7–3.1)
LYMPHOCYTES NFR BLD AUTO: 36.1 % (ref 19.6–45.3)
MCH RBC QN AUTO: 30 PG (ref 26.6–33)
MCHC RBC AUTO-ENTMCNC: 33.7 G/DL (ref 31.5–35.7)
MCV RBC AUTO: 89.2 FL (ref 79–97)
MONOCYTES # BLD AUTO: 0.54 10*3/MM3 (ref 0.1–0.9)
MONOCYTES NFR BLD AUTO: 6.6 % (ref 5–12)
NEUTROPHILS NFR BLD AUTO: 4.52 10*3/MM3 (ref 1.7–7)
NEUTROPHILS NFR BLD AUTO: 55.1 % (ref 42.7–76)
NITRITE UR QL STRIP: NEGATIVE
NRBC BLD AUTO-RTO: 0 /100 WBC (ref 0–0.2)
PH UR STRIP.AUTO: 6.5 [PH] (ref 5–8)
PLATELET # BLD AUTO: 235 10*3/MM3 (ref 140–450)
PMV BLD AUTO: 11 FL (ref 6–12)
POTASSIUM SERPL-SCNC: 4.3 MMOL/L (ref 3.5–5.2)
PROT UR QL STRIP: NEGATIVE
RBC # BLD AUTO: 5.73 10*6/MM3 (ref 4.14–5.8)
SODIUM SERPL-SCNC: 139 MMOL/L (ref 136–145)
SP GR UR STRIP: 1.02 (ref 1–1.03)
UROBILINOGEN UR QL STRIP: ABNORMAL
WBC NRBC COR # BLD AUTO: 8.2 10*3/MM3 (ref 3.4–10.8)

## 2024-06-11 PROCEDURE — 36415 COLL VENOUS BLD VENIPUNCTURE: CPT

## 2024-06-11 PROCEDURE — 81003 URINALYSIS AUTO W/O SCOPE: CPT

## 2024-06-11 PROCEDURE — 80048 BASIC METABOLIC PNL TOTAL CA: CPT

## 2024-06-11 PROCEDURE — 85025 COMPLETE CBC W/AUTO DIFF WBC: CPT

## 2024-06-12 ENCOUNTER — OFFICE VISIT (OUTPATIENT)
Dept: CARDIOLOGY | Facility: CLINIC | Age: 64
End: 2024-06-12
Payer: COMMERCIAL

## 2024-06-12 VITALS
SYSTOLIC BLOOD PRESSURE: 120 MMHG | BODY MASS INDEX: 36.29 KG/M2 | HEART RATE: 95 BPM | HEIGHT: 69 IN | WEIGHT: 245 LBS | DIASTOLIC BLOOD PRESSURE: 86 MMHG

## 2024-06-12 DIAGNOSIS — R97.20 ELEVATED PROSTATE SPECIFIC ANTIGEN (PSA): ICD-10-CM

## 2024-06-12 DIAGNOSIS — R55 SYNCOPE AND COLLAPSE: Primary | ICD-10-CM

## 2024-06-12 PROCEDURE — 99204 OFFICE O/P NEW MOD 45 MIN: CPT | Performed by: INTERNAL MEDICINE

## 2024-06-12 RX ORDER — TAMSULOSIN HYDROCHLORIDE 0.4 MG/1
1 CAPSULE ORAL EVERY EVENING
Qty: 60 CAPSULE | Refills: 5 | Status: SHIPPED | OUTPATIENT
Start: 2024-06-12

## 2024-06-12 NOTE — PROGRESS NOTES
"Chief Complaint  SYNCOPE, CARDIOMEGALY , and Dizziness    Subjective            Ronnie Baron presents to [facility] CARDIOLOG[facility]  Dizziness  Pertinent negatives include no chest pain.       63-year-old white male.  He has no previous cardiac history.  Recently he got up to walk to the kitchen and then felt dizzy and passed out.  He immediately regained consciousness.  He has had many other episodes after getting up he feels like that things \"go dark\" and then resolved.  He has not had any other syncopal episodes.  He has precipitously lost weight intentionally over the past 2 months he has lost some 40 pounds with medical therapy and nutritional changes.  He was not hypertensive before then.  He is on a 0.8 mg dose of Flomax.  He reports he tries to drink plenty of fluids.  He is borderline diabetic.  He denies chest pain, palpitations or excessive shortness of breath.    PMH  Past Medical History:   Diagnosis Date    Alteration in appetite     Anxiety     Chronic prostatitis 4/2/2024    CKD (chronic kidney disease) stage 3, GFR 30-59 ml/min     Depression     severe recurrent major depression with psychotic features    DVT (deep venous thrombosis)     Elevated PSA 09/22/22    Enlarged prostate on rectal examination 09/26/2022    Hyperglycemia     Hyperlipidemia     OCD (obsessive compulsive disorder)     TALON on AUTOCPAP 02/26/2019    Moderate severity TALON with AHI 20 events per hour.  Sleep related hypoxia present.    Renal insufficiency     Tachycardia     Vitamin D deficiency          SURGICALHX  Past Surgical History:   Procedure Laterality Date    ELBOW PROCEDURE  06/2014        SOC  Social History     Socioeconomic History    Marital status:      Spouse name: Arielle    Years of education: High school   Tobacco Use    Smoking status: Former     Current packs/day: 0.00     Average packs/day: 1 pack/day for 30.0 years (30.0 ttl pk-yrs)     Types: Cigarettes     Start date: 1/1/1972     " Quit date: 1992     Years since quittin.4    Smokeless tobacco: Former     Quit date: 1990   Vaping Use    Vaping status: Never Used   Substance and Sexual Activity    Alcohol use: Yes     Comment: rare occasional just pleasure  none since 2016    Drug use: No    Sexual activity: Not Currently     Partners: Female     Birth control/protection: Other         FAMHX  Family History   Problem Relation Age of Onset    Hypertension Mother     Stroke Mother         2016 with two blood clots    Depression Mother         takes welbutrin    Kidney disease Mother     Hypertension Father     Depression Father         takes medicine care give of wife    Heart disease Maternal Grandfather     Cancer Maternal Grandmother           ALLERGY  No Known Allergies     MEDSCURRENT    Current Outpatient Medications:     aspirin 81 MG EC tablet, Take 1 tablet by mouth Daily., Disp: , Rfl:     clomiPRAMINE (ANAFRANIL) 50 MG capsule, , Disp: , Rfl:     Farxiga 10 MG tablet, Take 10 mg by mouth Daily., Disp: , Rfl:     fluvoxaMINE (LUVOX) 100 MG tablet, , Disp: , Rfl:     gabapentin (NEURONTIN) 600 MG tablet, take 1 tablet by mouth TWICE a day, Disp: , Rfl: 0    levothyroxine (Synthroid) 50 MCG tablet, Take 1 tablet by mouth Every Morning., Disp: 90 tablet, Rfl: 1    LORazepam (ATIVAN) 0.5 MG tablet, , Disp: , Rfl:     Multiple Vitamins-Minerals (MENS MULTI VITAMIN & MINERAL PO), Take  by mouth., Disp: , Rfl:     OLANZapine (zyPREXA) 5 MG tablet, , Disp: , Rfl:     rosuvastatin (CRESTOR) 10 MG tablet, Take 1 tablet by mouth Daily. for cholesterol, Disp: 90 tablet, Rfl: 1    Semaglutide-Weight Management (Wegovy) 1 MG/0.5ML solution auto-injector, Inject 0.5 mL under the skin into the appropriate area as directed 1 (One) Time Per Week., Disp: 2 mL, Rfl: 0    tamsulosin (FLOMAX) 0.4 MG capsule 24 hr capsule, Take 1 capsule by mouth Every Evening. 30 minutes after meal, Disp: 60 capsule, Rfl: 5    traZODone (DESYREL)  "50 MG tablet, , Disp: , Rfl:     Vitamin D, Cholecalciferol, 1000 UNITS tablet, Take 2 tablets by mouth Daily., Disp: , Rfl:       Review of Systems   Constitutional: Negative. Positive for weight loss.   HENT: Negative.     Eyes: Negative.    Cardiovascular:  Positive for near-syncope and syncope. Negative for chest pain.   Respiratory: Negative.     Endocrine: Negative.    Hematologic/Lymphatic: Negative.    Skin: Negative.    Musculoskeletal: Negative.    Gastrointestinal: Negative.    Genitourinary: Negative.    Neurological: Negative.  Positive for dizziness.   Psychiatric/Behavioral: Negative.          Objective     /86   Pulse 95   Ht 175.3 cm (69\")   Wt 111 kg (245 lb)   BMI 36.18 kg/m²       General Appearance:   well developed  well nourished  HENT:   oropharynx moist  lips not cyanotic  Neck:  thyroid not enlarged  supple  Respiratory:  no respiratory distress  normal breath sounds  no rales  Cardiovascular:  no jugular venous distention  regular rhythm  apical impulse normal  S1 normal, S2 normal  no S3, no S4   no murmur  no rub, no thrill  carotid pulses normal; no bruit  pedal pulses normal  lower extremity edema: none    Musculoskeletal:  no clubbing of fingers.   normocephalic, head atraumatic  Skin:   warm, dry  Psychiatric:  judgement and insight appropriate  normal mood and affect      Result Review :     The following data was reviewed by: Cristian Longo MD on 06/12/2024:    CMP          2/7/2024    11:54 6/5/2024    15:28 6/11/2024    08:59   CMP   Glucose 109  97  100    BUN 20  20  24    Creatinine 1.61  1.50  1.50    EGFR 48.1  52.3  52.3    Sodium 140  140  139    Potassium 4.7  5.0  4.3    Chloride 105  103  105    Calcium 9.6  9.3  9.1    Total Protein 7.2  7.3     Albumin 4.3  4.3     Globulin 2.9  3.0     Total Bilirubin 0.3  0.2     Alkaline Phosphatase 189  149     AST (SGOT) 23  12     ALT (SGPT) 29  22     Albumin/Globulin Ratio 1.5  1.4     BUN/Creatinine " Ratio 12.4  13.3  16.0    Anion Gap 7.0  9.0  10.0      CBC          12/1/2023    09:55 6/5/2024    15:28 6/11/2024    08:59   CBC   WBC 6.45  6.25  8.20    RBC 5.42  5.52  5.73    Hemoglobin 16.7  16.6  17.2    Hematocrit 49.3  50.2  51.1    MCV 91.0  90.9  89.2    MCH 30.8  30.1  30.0    MCHC 33.9  33.1  33.7    RDW 12.4  13.4  13.6    Platelets 252  224  235      Lipid Panel          8/18/2023    10:48 2/7/2024    11:54   Lipid Panel   Total Cholesterol 152  170    Triglycerides 180  158    HDL Cholesterol 48  51    VLDL Cholesterol 30  27    LDL Cholesterol  74  92    LDL/HDL Ratio 1.42  1.71      TSH          8/18/2023    10:48 2/7/2024    11:54 6/5/2024    15:28   TSH   TSH 3.450  3.260  1.720        Data reviewed : Primary care records reviewed, EKG from June 2024 showed sinus rhythm, rate 90, no ST changes.       Procedures                Assessment and Plan        ASSESSMENT:  Encounter Diagnoses   Name Primary?    Elevated prostate specific antigen (PSA)     Syncope and collapse Yes         PLAN:    1.  Syncope and collapse-this was orthostatic by history.  He has had multiple other episodes of orthostasis after standing.  This is likely due to a combination of precipitous weight loss, may be some volume depletion.  Possibly the effect of the high-dose Flomax.  I told him to try to drink 70 ounces of water a day, I recommend eating a bit more calories per day to slow down his weight loss, and I am reducing his Flomax to once daily instead of 2 pills.  2.  An echo will be scheduled to rule out structural abnormalities  3.  Will discuss diagnostic results when available, otherwise the patient will be followed as needed          Patient was given instructions and counseling regarding his condition or for health maintenance advice. Please see specific information pulled into the AVS if appropriate.             SOURAV Longo MD  6/12/2024    12:12 EDT

## 2024-06-14 ENCOUNTER — PATIENT ROUNDING (BHMG ONLY) (OUTPATIENT)
Dept: CARDIOLOGY | Facility: CLINIC | Age: 64
End: 2024-06-14
Payer: COMMERCIAL

## 2024-06-14 NOTE — PROGRESS NOTES
**A [employer] message has been sent fot PATIENT R[place] [facility] [place] .

## 2024-06-26 ENCOUNTER — OFFICE VISIT (OUTPATIENT)
Dept: UROLOGY | Facility: CLINIC | Age: 64
End: 2024-06-26
Payer: COMMERCIAL

## 2024-06-26 VITALS
HEIGHT: 69 IN | WEIGHT: 242 LBS | DIASTOLIC BLOOD PRESSURE: 74 MMHG | SYSTOLIC BLOOD PRESSURE: 100 MMHG | BODY MASS INDEX: 35.84 KG/M2 | HEART RATE: 93 BPM

## 2024-06-26 DIAGNOSIS — R97.20 ELEVATED PROSTATE SPECIFIC ANTIGEN (PSA): ICD-10-CM

## 2024-06-26 DIAGNOSIS — N40.1 BPH WITH URINARY OBSTRUCTION: Primary | ICD-10-CM

## 2024-06-26 DIAGNOSIS — N13.8 BPH WITH URINARY OBSTRUCTION: Primary | ICD-10-CM

## 2024-06-26 NOTE — PROGRESS NOTES
"   UROLOGY OFFICE FOLLOW UP NOTE    Subjective   HPI  Ronnie Baron is a 63 y.o. male.  Patient of Dr. Kennedy's who presents for evaluation of BPH with outlet obstruction, LUTS.  Dr. Kennedy is performed cystoscopy and has recommended potential outlet obstruction procedure via aqua ablation.  History of Present Illness  He is accompanied by his wife and grandchildren.    History of BPH with bothersome LUTS.  Despite being on tamsulosin, he reports no significant change in his symptoms.  Very bothered by his symptoms.  Wishes to pursue outlet obstruction procedure for his refractory LUTS.    He is currently not sexually active.     His daily regimen includes baby aspirin, which he has been taking for several years.         ___________  IPSS 6/26/2024: 18 (moderate)    PSA  2/7/2024: 4.3  10/28/2022: 3.58  9/7/2022: 4.52  1/16/2019: 2.01    Office cystoscopy (Dr. Kennedy) 6/7/2024:  Prostate gland is large and obstructive.  Patient has large lateral lobes and no significant median lobe.  Bladder is trabeculated, no tumors    Uroflow 6/7/2024: Average flow 7.2 mL/s    MRI prostate 3/23/2024: 35.6 cc gland; no PI-RADS lesions      Review of systems  A review of systems was performed, and positive findings are noted in the HPI.    Objective     Vital Signs:   /74   Pulse 93   Ht 175.3 cm (69\")   Wt 110 kg (242 lb)   BMI 35.74 kg/m²       Physical exam  No acute distress, well-nourished  Awake alert and oriented  Mood normal; affect normal  Physical Exam        Problem List:  Patient Active Problem List   Diagnosis    Anxiety    OCD (obsessive compulsive disorder)    Severe recurrent major depression with psychotic features    CKD (chronic kidney disease) stage 3, GFR 30-59 ml/min    Hyperglycemia    Hyperlipidemia    Vitamin D deficiency    History of deep vein thrombosis (DVT) of lower extremity    Erythrocytosis    Hypothyroidism, acquired    Obstructive sleep apnea treated with auto CPAP    Sleep-related hypoxia "    Hypersomnia due to medical condition    Class 2 severe obesity due to excess calories with serious comorbidity and body mass index (BMI) of 39.0 to 39.9 in adult    Impaired fasting glucose    LFT elevation    Acute kidney failure    Long term (current) use of non-steroidal anti-inflammatories (nsaid)    Personal history of other venous thrombosis and embolism    Dyslipidemia    Prediabetes    Elevated prostate specific antigen (PSA)    Enlarged prostate on rectal examination    Bladder outlet obstruction    History of elevated prostate specific antigen (PSA)    History of prostatitis    BPH with urinary obstruction    Chronic prostatitis       Assessment & Plan   Diagnoses and all orders for this visit:    1. BPH with urinary obstruction (Primary)    2. Elevated prostate specific antigen (PSA)      Performed extensive chart review at today's visit.    History of elevated PSA; further workup via prostate MRI negative.  No indication for further workup at this time.    BPH with refractory lower urinary tract symptoms; Failed conservative management with BPH medication of greater than 3 months.    Prior workup reviewed; believe he would benefit from outlet procedure.    Discussed option of management via transurethral robotic water ablation of prostate with transrectal ultrasound guidance.    Discussed that results after aquablation are not guaranteed and he could have persistence of symptoms, need for further management..  Also aware that the entire prostate is not removed thus there will be regrowth of the prostate over a period of time and could have recurrence of symptoms. Other risks including but not limited to bleeding, infection, pain, need for further procedures, risks of malignancy within the specimen, need for decompression of his bladder via catheter or self cath (unlikely in his case), worsening urinary symptoms, urgency, rare risk of sexual side effects with aquablation, urinary incontinence, or  damage to surrounding structures.  Also discussed that this is a procedure performed under general anesthesia which has inherent risks including and up to death.  Patient was understanding of these risks.All questions were addressed after providing time for discussion.    Patient and wife participated discussion and note understanding.  Wishes to proceed if candidate.    Discussed Medicare requirements of TRUS prostate measurement.  Will arrange Dr. Kennedy's office.  If deemed eligible for aqua ablation, then will plan to get on surgery schedule.    Await TRUS measurement; will notify him of this once obtained to schedule outlet procedure.     All questions addressed      Patient or patient representative verbalized consent for the use of Ambient Listening during the visit with  Antionette Boss MD for chart documentation. 6/26/2024  19:53 EDT

## 2024-06-27 ENCOUNTER — OFFICE VISIT (OUTPATIENT)
Dept: FAMILY MEDICINE CLINIC | Facility: CLINIC | Age: 64
End: 2024-06-27
Payer: COMMERCIAL

## 2024-06-27 VITALS
OXYGEN SATURATION: 97 % | HEART RATE: 91 BPM | DIASTOLIC BLOOD PRESSURE: 74 MMHG | HEIGHT: 69 IN | BODY MASS INDEX: 35.7 KG/M2 | TEMPERATURE: 97.1 F | SYSTOLIC BLOOD PRESSURE: 126 MMHG | WEIGHT: 241 LBS

## 2024-06-27 DIAGNOSIS — E66.01 CLASS 2 SEVERE OBESITY WITH SERIOUS COMORBIDITY AND BODY MASS INDEX (BMI) OF 37.0 TO 37.9 IN ADULT, UNSPECIFIED OBESITY TYPE: ICD-10-CM

## 2024-06-27 PROCEDURE — 99213 OFFICE O/P EST LOW 20 MIN: CPT | Performed by: NURSE PRACTITIONER

## 2024-06-27 RX ORDER — SEMAGLUTIDE 1 MG/.5ML
1 INJECTION, SOLUTION SUBCUTANEOUS WEEKLY
Qty: 2 ML | Refills: 0 | Status: SHIPPED | OUTPATIENT
Start: 2024-06-27

## 2024-06-27 NOTE — PROGRESS NOTES
Chief Complaint  Obesity (Weight loss)    Subjective            Ronnie Baron presents to [facility]  History of Present Illness  Patient is here today for the follow-up with regards to the Wegovy use for the obesity and her weight loss and patient has lost a total of over 40 pounds tolerating medication well for the most part every once in a while if he takes his regular medications for his other comorbid's on any of the stomach he does get a little nauseated at the morning otherwise experiencing no side effects no issues no nausea vomiting diarrhea does deal with some constipation but he does not have to take any medicine for this he just stays well-hydrated and eats more fiber and then has no further issues no abdominal pain no lumps in the throat and he is soon to have a prostate procedure done and I did explain to the patient that he needs to reach out to them find out about the date and how long they want him to hold the GLP-1/Wegovy prior to the surgery and patient and wife verbalized understanding    average [id number] bebe daily and not counting carbs daily right now--    Then patient also saw cardiology and reports that he wants him to drink 70 oz water daily as he feels like that presyncopal or syncopal episodes that he has been experiencing has to do with a hydration issue    Also he saw nephrology for the chronic kidney disease stage III and reports that he was very pleased with his most recent panels as it showed that the EGFR went up from the 40s into the 50s      PHQ-2 Total Score:    PHQ-9 Total Score:      Past Medical History:   Diagnosis Date    Alteration in appetite     Anxiety     Chronic prostatitis 4/2/2024    CKD (chronic kidney disease) stage 3, GFR 30-59 ml/min     Depression     severe recurrent major depression with psychotic features    DVT (deep venous thrombosis)     Elevated PSA 09/22/22    Enlarged prostate on rectal examination 09/26/2022     Hyperglycemia     Hyperlipidemia     OCD (obsessive compulsive disorder)     TALON on AUTOCPAP 2019    Moderate severity TALON with AHI 20 events per hour.  Sleep related hypoxia present.    Renal insufficiency     Tachycardia     Vitamin D deficiency        No Known Allergies     Past Surgical History:   Procedure Laterality Date    ELBOW PROCEDURE  2014        Social History     Tobacco Use    Smoking status: Former     Current packs/day: 0.00     Average packs/day: 1 pack/day for 30.0 years (30.0 ttl pk-yrs)     Types: Cigarettes     Start date: 1972     Quit date: 1992     Years since quittin.5    Smokeless tobacco: Former     Quit date: 1990   Vaping Use    Vaping status: Never Used   Substance Use Topics    Alcohol use: Yes     Comment: rare occasional just pleasure  none since 2016    Drug use: No       Family History   Problem Relation Age of Onset    Hypertension Mother     Stroke Mother         2016 with two blood clots    Depression Mother         takes welbutrin    Kidney disease Mother     Hypertension Father     Depression Father         takes medicine care give of wife    Heart disease Maternal Grandfather     Cancer Maternal Grandmother         Health Maintenance Due   Topic Date Due    ANNUAL PHYSICAL  Never done    RSV Vaccine - Adults (1 - 1-dose 60+ series) Never done    COVID-19 Vaccine ( season) Never done        Current Outpatient Medications on File Prior to Visit   Medication Sig    aspirin 81 MG EC tablet Take 1 tablet by mouth Daily.    clomiPRAMINE (ANAFRANIL) 50 MG capsule     Farxiga 10 MG tablet Take 10 mg by mouth Daily.    fluvoxaMINE (LUVOX) 100 MG tablet     gabapentin (NEURONTIN) 600 MG tablet take 1 tablet by mouth TWICE a day    levothyroxine (Synthroid) 50 MCG tablet Take 1 tablet by mouth Every Morning.    LORazepam (ATIVAN) 0.5 MG tablet     Multiple Vitamins-Minerals (MENS MULTI VITAMIN & MINERAL PO) Take  by mouth.     "OLANZapine (zyPREXA) 5 MG tablet     rosuvastatin (CRESTOR) 10 MG tablet Take 1 tablet by mouth Daily. for cholesterol    tamsulosin (FLOMAX) 0.4 MG capsule 24 hr capsule Take 1 capsule by mouth Every Evening. 30 minutes after meal (Patient taking differently: Take 1 capsule by mouth Every Evening. 30 minutes after meal;   PT just started taking 2 tablets daily, and has started having a lot of dizziness and passed out 6/26/24)    traZODone (DESYREL) 50 MG tablet     Vitamin D, Cholecalciferol, 1000 UNITS tablet Take 2 tablets by mouth Daily.    [DISCONTINUED] Semaglutide-Weight Management (Wegovy) 1 MG/0.5ML solution auto-injector Inject 0.5 mL under the skin into the appropriate area as directed 1 (One) Time Per Week.     No current facility-administered medications on file prior to visit.       Immunization History   Administered Date(s) Administered    Shingrix 07/17/2019, 10/23/2019    Td (TDVAX) 07/06/1998    Tdap 07/01/2016       Review of Systems   Constitutional:  Negative for unexpected weight gain and unexpected weight loss.   Respiratory:  Negative for shortness of breath.    Cardiovascular:  Negative for chest pain and leg swelling.   Gastrointestinal:  Positive for constipation. Negative for abdominal pain, blood in stool, nausea and vomiting.        Some nausea at times of the morning --increasing the fiber in diet    Genitourinary:  Positive for difficulty urinating. Negative for dysuria and nocturia.        Enlarged--and going for the ablation --and was having issues with emptying and then had the cysto    Neurological:  Positive for dizziness and light-headedness. Negative for syncope.        Saw cardiology and then pt reports was told to drink 70 oz water daily and then also getting an echo    Psychiatric/Behavioral:  Negative for self-injury and suicidal ideas.         Objective     /74   Pulse 91   Temp 97.1 °F (36.2 °C) (Temporal)   Ht 175.3 cm (69\")   Wt 109 kg (241 lb)   SpO2 97%   " BMI 35.59 kg/m²       Physical Exam  Vitals and nursing note reviewed. Exam conducted with a chaperone present (wife).   Constitutional:       Appearance: Normal appearance.   HENT:      Head: Normocephalic.      Right Ear: External ear normal.      Left Ear: External ear normal.      Nose: Nose normal.      Mouth/Throat:      Mouth: Mucous membranes are moist.   Eyes:      Pupils: Pupils are equal, round, and reactive to light.   Cardiovascular:      Rate and Rhythm: Normal rate and regular rhythm.      Pulses:           Carotid pulses are 2+ on the right side and 2+ on the left side.     Heart sounds: Normal heart sounds.   Pulmonary:      Effort: Pulmonary effort is normal.      Breath sounds: Normal breath sounds.   Abdominal:      Palpations: Abdomen is soft.      Tenderness: There is no abdominal tenderness.   Musculoskeletal:         General: Normal range of motion.      Cervical back: Normal range of motion and neck supple.      Right lower leg: No edema.      Left lower leg: No edema.   Skin:     General: Skin is warm and dry.   Neurological:      Mental Status: He is alert and oriented to person, place, and time.   Psychiatric:         Mood and Affect: Mood normal.         Behavior: Behavior normal.         Thought Content: Thought content normal.         Judgment: Judgment normal.         Result Review :     The following data was reviewed by: CONSUELO Coronel on 06/27/2024:                 Office Visit with SOURAV Longo MD (06/12/2024)      Assessment and Plan      Diagnoses and all orders for this visit:    1. Class 2 severe obesity with serious comorbidity and body mass index (BMI) of 37.0 to 37.9 in adult, unspecified obesity type  -     Semaglutide-Weight Management (Wegovy) 1 MG/0.5ML solution auto-injector; Inject 0.5 mL under the skin into the appropriate area as directed 1 (One) Time Per Week.  Dispense: 2 mL; Refill: 0    We will keep the same dose currently on the Wegovy for now as  patient has been having those little presyncopal episodes he did see cardiology they are going to do an echocardiogram they want him to work on hydration     And then also is fixing to have a prostate procedure and because of all of this going on with the presyncopal episodes and the prostate procedure we will to stay at the same dose and I did advise the patient and his wife to contact the surgeon to find out how long they want him off the GLP-1 prior to the surgery        Follow Up     Return in about 1 month (around 7/29/2024), or if symptoms worsen or fail to improve, for Recheck.    Patient was given instructions and counseling regarding his condition or for health maintenance advice. Please see specific information pulled into the AVS if appropriate.            Ronnie Baron  reports that he quit smoking about 32 years ago. His smoking use included cigarettes. He started smoking about 52 years ago. He has a 30 pack-year smoking history. He quit smokeless tobacco use about 34 years ago. I have educated him on the risk of diseases from using tobacco products such as cancer, COPD, and heart disease.

## 2024-07-03 ENCOUNTER — PROCEDURE VISIT (OUTPATIENT)
Dept: UROLOGY | Facility: CLINIC | Age: 64
End: 2024-07-03
Payer: COMMERCIAL

## 2024-07-03 DIAGNOSIS — N40.0 ENLARGED PROSTATE ON RECTAL EXAMINATION: Primary | ICD-10-CM

## 2024-07-03 NOTE — PROGRESS NOTES
Patient is here for ultrasound of prostate for volume study.    Ultrasound of prostate.    Patient was placed in the left lateral position and ultrasound was done in axial and sagittal plane.  Used 8 MHz transducer which was inserted into the rectal canal.    Height of the prostate is 31.2 mm and width is 45.2 mm.  Length is 44.3 mm and total volume of the prostate is 32.7 cm³.  Seminal vesicles are normal and apex of the prostate is normal.  I took pictures of R 4 and L4..  Patient tolerated the procedure very well.

## 2024-07-25 ENCOUNTER — HOSPITAL ENCOUNTER (OUTPATIENT)
Dept: CARDIOLOGY | Facility: HOSPITAL | Age: 64
Discharge: HOME | End: 2024-07-25
Admitting: INTERNAL MEDICINE
Payer: COMMERCIAL

## 2024-07-25 DIAGNOSIS — R55 SYNCOPE AND COLLAPSE: ICD-10-CM

## 2024-07-25 LAB
AORTIC DIMENSIONLESS INDEX: 0.93 (DI)
ASCENDING AORTA: 3.2 CM
BH CV ECHO MEAS - ACS: 1.98 CM
BH CV ECHO MEAS - AO MAX PG: 3.8 MMHG
BH CV ECHO MEAS - AO MEAN PG: 1.96 MMHG
BH CV ECHO MEAS - AO ROOT DIAM: 3.5 CM
BH CV ECHO MEAS - AO V2 MAX: 97 CM/SEC
BH CV ECHO MEAS - AO V2 VTI: 16.5 CM
BH CV ECHO MEAS - AVA(I,D): 4.2 CM2
BH CV ECHO MEAS - EDV(CUBED): 93.9 ML
BH CV ECHO MEAS - EDV(MOD-SP2): 79.5 ML
BH CV ECHO MEAS - EDV(MOD-SP4): 100 ML
BH CV ECHO MEAS - EF(MOD-BP): 57.7 %
BH CV ECHO MEAS - EF(MOD-SP2): 57 %
BH CV ECHO MEAS - EF(MOD-SP4): 57.2 %
BH CV ECHO MEAS - ESV(CUBED): 26.6 ML
BH CV ECHO MEAS - ESV(MOD-SP2): 34.2 ML
BH CV ECHO MEAS - ESV(MOD-SP4): 42.8 ML
BH CV ECHO MEAS - FS: 34.4 %
BH CV ECHO MEAS - IVS/LVPW: 1 CM
BH CV ECHO MEAS - IVSD: 0.79 CM
BH CV ECHO MEAS - LA DIMENSION: 3.3 CM
BH CV ECHO MEAS - LAT PEAK E' VEL: 5.8 CM/SEC
BH CV ECHO MEAS - LV DIASTOLIC VOL/BSA (35-75): 44.7 CM2
BH CV ECHO MEAS - LV MASS(C)D: 114.1 GRAMS
BH CV ECHO MEAS - LV MAX PG: 2.9 MMHG
BH CV ECHO MEAS - LV MEAN PG: 1.29 MMHG
BH CV ECHO MEAS - LV SYSTOLIC VOL/BSA (12-30): 19.1 CM2
BH CV ECHO MEAS - LV V1 MAX: 85.9 CM/SEC
BH CV ECHO MEAS - LV V1 VTI: 15.5 CM
BH CV ECHO MEAS - LVIDD: 4.5 CM
BH CV ECHO MEAS - LVIDS: 3 CM
BH CV ECHO MEAS - LVOT AREA: 4.5 CM2
BH CV ECHO MEAS - LVOT DIAM: 2.39 CM
BH CV ECHO MEAS - LVPWD: 0.79 CM
BH CV ECHO MEAS - MED PEAK E' VEL: 7.6 CM/SEC
BH CV ECHO MEAS - MV A MAX VEL: 71.1 CM/SEC
BH CV ECHO MEAS - MV DEC TIME: 0.35 SEC
BH CV ECHO MEAS - MV E MAX VEL: 52 CM/SEC
BH CV ECHO MEAS - MV E/A: 0.73
BH CV ECHO MEAS - PA V2 MAX: 152 CM/SEC
BH CV ECHO MEAS - RVDD: 3.6 CM
BH CV ECHO MEAS - SV(LVOT): 69.6 ML
BH CV ECHO MEAS - SV(MOD-SP2): 45.3 ML
BH CV ECHO MEAS - SV(MOD-SP4): 57.2 ML
BH CV ECHO MEAS - SVI(LVOT): 31.1 ML/M2
BH CV ECHO MEAS - SVI(MOD-SP2): 20.3 ML/M2
BH CV ECHO MEAS - SVI(MOD-SP4): 25.6 ML/M2
BH CV ECHO MEAS - TAPSE (>1.6): 1.17 CM
BH CV ECHO MEAS - TR MAX PG: 20.3 MMHG
BH CV ECHO MEAS - TR MAX VEL: 225.3 CM/SEC
BH CV ECHO MEASUREMENTS AVERAGE E/E' RATIO: 7.76
BH CV XLRA - RV LENGTH: 6.7 CM
BH CV XLRA - RV MID: 4.2 CM
BH CV XLRA - TDI S': 14.5 CM/SEC
IVRT: 85 MS
LEFT ATRIUM VOLUME INDEX: 13.6 ML/M2

## 2024-07-25 PROCEDURE — 93306 TTE W/DOPPLER COMPLETE: CPT

## 2024-09-01 DIAGNOSIS — E78.2 MIXED HYPERLIPIDEMIA: Chronic | ICD-10-CM

## 2024-09-01 DIAGNOSIS — E03.9 HYPOTHYROIDISM, ACQUIRED: ICD-10-CM

## 2024-09-03 RX ORDER — ROSUVASTATIN CALCIUM 10 MG/1
10 TABLET, COATED ORAL DAILY
Qty: 90 TABLET | Refills: 0 | Status: SHIPPED | OUTPATIENT
Start: 2024-09-03

## 2024-09-03 RX ORDER — LEVOTHYROXINE SODIUM 50 MCG
50 TABLET ORAL EVERY MORNING
Qty: 90 TABLET | Refills: 0 | Status: SHIPPED | OUTPATIENT
Start: 2024-09-03

## 2024-09-20 DIAGNOSIS — R97.20 ELEVATED PROSTATE SPECIFIC ANTIGEN (PSA): ICD-10-CM

## 2024-09-20 RX ORDER — TAMSULOSIN HYDROCHLORIDE 0.4 MG/1
2 CAPSULE ORAL EVERY EVENING
Qty: 60 CAPSULE | Refills: 5 | OUTPATIENT
Start: 2024-09-20

## 2024-09-24 ENCOUNTER — TELEPHONE (OUTPATIENT)
Dept: UROLOGY | Facility: CLINIC | Age: 64
End: 2024-09-24
Payer: COMMERCIAL

## 2024-10-19 DIAGNOSIS — E78.2 MIXED HYPERLIPIDEMIA: Chronic | ICD-10-CM

## 2024-10-19 DIAGNOSIS — E03.9 HYPOTHYROIDISM, ACQUIRED: ICD-10-CM

## 2024-10-21 RX ORDER — ROSUVASTATIN CALCIUM 10 MG/1
10 TABLET, COATED ORAL DAILY
Qty: 90 TABLET | Refills: 0 | Status: SHIPPED | OUTPATIENT
Start: 2024-10-21

## 2024-10-21 RX ORDER — LEVOTHYROXINE SODIUM 50 MCG
50 TABLET ORAL EVERY MORNING
Qty: 90 TABLET | Refills: 0 | Status: SHIPPED | OUTPATIENT
Start: 2024-10-21

## 2024-10-21 NOTE — TELEPHONE ENCOUNTER
Based on the last visit and last labs in June we will go ahead and proceed with courtesy refills for 90 days only

## 2024-11-27 ENCOUNTER — TELEPHONE (OUTPATIENT)
Dept: UROLOGY | Facility: CLINIC | Age: 64
End: 2024-11-27
Payer: COMMERCIAL

## 2024-11-27 NOTE — TELEPHONE ENCOUNTER
Attempted to contact patient regarding overdue testing from 02/16/2024 that haven't been performed.  Left voicemail for patient to contact office if he had performed at another facility. wnt

## 2024-12-12 ENCOUNTER — OFFICE VISIT (OUTPATIENT)
Dept: UROLOGY | Facility: CLINIC | Age: 64
End: 2024-12-12
Payer: COMMERCIAL

## 2024-12-12 VITALS
BODY MASS INDEX: 36.88 KG/M2 | TEMPERATURE: 98.7 F | HEART RATE: 108 BPM | HEIGHT: 69 IN | DIASTOLIC BLOOD PRESSURE: 68 MMHG | SYSTOLIC BLOOD PRESSURE: 118 MMHG | WEIGHT: 249 LBS

## 2024-12-12 DIAGNOSIS — R97.20 ELEVATED PROSTATE SPECIFIC ANTIGEN (PSA): ICD-10-CM

## 2024-12-12 DIAGNOSIS — N52.01 ERECTILE DYSFUNCTION DUE TO ARTERIAL INSUFFICIENCY: ICD-10-CM

## 2024-12-12 DIAGNOSIS — N13.8 BPH WITH URINARY OBSTRUCTION: Primary | ICD-10-CM

## 2024-12-12 DIAGNOSIS — N48.6 PEYRONIE'S DISEASE: ICD-10-CM

## 2024-12-12 DIAGNOSIS — N40.1 BPH WITH URINARY OBSTRUCTION: Primary | ICD-10-CM

## 2024-12-12 DIAGNOSIS — R81 GLYCOSURIA: ICD-10-CM

## 2024-12-12 LAB
BILIRUB BLD-MCNC: NEGATIVE MG/DL
CLARITY, POC: CLEAR
COLOR UR: YELLOW
EXPIRATION DATE: ABNORMAL
GLUCOSE UR STRIP-MCNC: ABNORMAL MG/DL
KETONES UR QL: NEGATIVE
LEUKOCYTE EST, POC: NEGATIVE
Lab: ABNORMAL
NITRITE UR-MCNC: NEGATIVE MG/ML
PH UR: 5.5 [PH] (ref 5–8)
PROT UR STRIP-MCNC: NEGATIVE MG/DL
RBC # UR STRIP: NEGATIVE /UL
SP GR UR: 1.02 (ref 1–1.03)
UROBILINOGEN UR QL: ABNORMAL

## 2024-12-12 PROCEDURE — 84153 ASSAY OF PSA TOTAL: CPT | Performed by: UROLOGY

## 2024-12-12 RX ORDER — TAMSULOSIN HYDROCHLORIDE 0.4 MG/1
1 CAPSULE ORAL EVERY EVENING
Qty: 90 CAPSULE | Refills: 1 | Status: SHIPPED | OUTPATIENT
Start: 2024-12-12 | End: 2025-06-10

## 2024-12-12 RX ORDER — TADALAFIL 5 MG/1
5 TABLET ORAL DAILY PRN
Qty: 90 TABLET | Refills: 1 | Status: SHIPPED | OUTPATIENT
Start: 2024-12-12 | End: 2025-06-10

## 2024-12-12 NOTE — PROGRESS NOTES
"Chief Complaint  Benign Prostatic Hypertrophy (Pt states he stopped tamsulosin about a month ago) and Elevated PSA (Last PSA on 2/7/24 was 4.3..Drawing PSA today)    Erectile dysfunction.    Peyronie's disease    Elevated PSA    Subjective no acute distress        Ronnie Baron presents to [facility]  History of Present Illness    64-year-old white male is here for evaluation of his prostate gland.  Patient continues to have straining to urinate and urgency.  He has intermittency every time AUA score is 14/35 but the quality score is 1.  Patient was referred to Dr. Patrick for aquablation of prostate but he has not heard anything from her recently.PSA on 2/7/2024 was 4.3.  Prostate volume is 32.7 cc on ultrasound prostate on 7/3/2024.  MRI prostate in March 2024 did not reveal any PI-RADS lesion.    Patient also has Peyronie's disease and his penis curves downward.    Patient continues to have difficulty with having erection and has not had sex for some time.  Patient has no coronary problems    Objective   Vital Signs:   /68 (BP Location: Right arm, Patient Position: Sitting, Cuff Size: Adult)   Pulse 108   Temp 98.7 °F (37.1 °C) (Temporal)   Ht 175.3 cm (69.02\")   Wt 113 kg (249 lb)   BMI 36.75 kg/m²     No Known Allergies   Past medical history:  has a past medical history of Alteration in appetite, Anxiety, Chronic prostatitis (4/2/2024), CKD (chronic kidney disease) stage 3, GFR 30-59 ml/min, Depression, DVT (deep venous thrombosis), Elevated PSA (09/22/22), Enlarged prostate on rectal examination (09/26/2022), Hyperglycemia, Hyperlipidemia, OCD (obsessive compulsive disorder), TALON on AUTOCPAP (02/26/2019), Renal insufficiency, Tachycardia, and Vitamin D deficiency.   Past surgical history:  has a past surgical history that includes Elbow surgery (06/2014) and Cystoscopy.  Personal history: family history includes Cancer in his maternal grandmother; Depression in his father " and mother; Heart disease in his maternal grandfather; Hypertension in his father and mother; Kidney disease in his mother; Stroke in his mother.  Social history:  reports that he quit smoking about 32 years ago. His smoking use included cigarettes. He started smoking about 52 years ago. He has a 30 pack-year smoking history. He has been exposed to tobacco smoke. He quit smokeless tobacco use about 34 years ago. He reports current alcohol use. He reports that he does not use drugs.    Review of Systems    Please see past medical and surgical history    Physical Exam  Constitutional:       General: He is not in acute distress.     Appearance: Normal appearance. He is obese. He is not ill-appearing or toxic-appearing.   HENT:      Head: Normocephalic and atraumatic.      Ears:      Comments: No hearing loss  Abdominal:      Tenderness: There is no abdominal tenderness. There is no right CVA tenderness or left CVA tenderness.      Hernia: A hernia is present.      Comments: Tiny umbilical hernia   Genitourinary:     Comments: Penis is circumcised.    Severe Peyronie's plaque at the base and also at the corona.    Right and left scrotum is normal    Right and left testicle and epididymis is normal.    DANGELO.  Prostate gland is just about 40 g and consistency is like tennis ball.  No hard lesions felt.  Musculoskeletal:         General: Normal range of motion.   Skin:     General: Skin is warm.      Coloration: Skin is not jaundiced.   Neurological:      General: No focal deficit present.      Mental Status: He is alert and oriented to person, place, and time.      Motor: No weakness.      Gait: Gait normal.   Psychiatric:         Mood and Affect: Mood normal.         Behavior: Behavior normal.         Thought Content: Thought content normal.         Judgment: Judgment normal.        Result Review :                 Assessment and Plan    Diagnoses and all orders for this visit:    1. BPH with urinary obstruction  (Primary)  -     POC Urinalysis Dipstick, Automated  -     PSA DIAGNOSTIC  -     tamsulosin (FLOMAX) 0.4 MG capsule 24 hr capsule; Take 1 capsule by mouth Every Evening for 180 days. 30 minutes after meal  Dispense: 90 capsule; Refill: 1  -     tadalafil (CIALIS) 5 MG tablet; Take 1 tablet by mouth Daily As Needed for Erectile Dysfunction for up to 180 days.  Dispense: 90 tablet; Refill: 1    2. Elevated prostate specific antigen (PSA)  -     PSA DIAGNOSTIC  -     tamsulosin (FLOMAX) 0.4 MG capsule 24 hr capsule; Take 1 capsule by mouth Every Evening for 180 days. 30 minutes after meal  Dispense: 90 capsule; Refill: 1    3. Erectile dysfunction due to arterial insufficiency  -     tadalafil (CIALIS) 5 MG tablet; Take 1 tablet by mouth Daily As Needed for Erectile Dysfunction for up to 180 days.  Dispense: 90 tablet; Refill: 1    4. Peyronie's disease  -     tadalafil (CIALIS) 5 MG tablet; Take 1 tablet by mouth Daily As Needed for Erectile Dysfunction for up to 180 days.  Dispense: 90 tablet; Refill: 1    Will restart the patient on tamsulosin 0.4 mg PC.  Also give him tadalafil 5 mg daily which showed potentiate effect of tamsulosin on prostate gland.  Should help his ED and Peyronie's disease also.  PSA is done today    Referred the patient to Dr. Patrick again in case he needs some prostatic surgery.     Brief Urine Lab Results  (Last result in the past 365 days)        Color   Clarity   Blood   Leuk Est   Nitrite   Protein   CREAT   Urine HCG        12/12/24 0940 Yellow   Clear   Negative   Negative   Negative   Negative                    Follow Up   No follow-ups on file.  Patient was given instructions and counseling regarding his condition or for health maintenance advice. Please see specific information pulled into the AVS if appropriate.     Angela eKnnedy MD

## 2024-12-13 ENCOUNTER — TRANSCRIBE ORDERS (OUTPATIENT)
Dept: LAB | Facility: HOSPITAL | Age: 64
End: 2024-12-13
Payer: COMMERCIAL

## 2024-12-13 ENCOUNTER — TELEPHONE (OUTPATIENT)
Dept: UROLOGY | Facility: CLINIC | Age: 64
End: 2024-12-13
Payer: COMMERCIAL

## 2024-12-13 ENCOUNTER — LAB (OUTPATIENT)
Dept: LAB | Facility: HOSPITAL | Age: 64
End: 2024-12-13
Payer: COMMERCIAL

## 2024-12-13 DIAGNOSIS — N18.30 STAGE 3 CHRONIC KIDNEY DISEASE, UNSPECIFIED WHETHER STAGE 3A OR 3B CKD: ICD-10-CM

## 2024-12-13 DIAGNOSIS — N18.30 STAGE 3 CHRONIC KIDNEY DISEASE, UNSPECIFIED WHETHER STAGE 3A OR 3B CKD: Primary | ICD-10-CM

## 2024-12-13 LAB
ANION GAP SERPL CALCULATED.3IONS-SCNC: 12.4 MMOL/L (ref 5–15)
BILIRUB UR QL STRIP: NEGATIVE
BUN SERPL-MCNC: 20 MG/DL (ref 8–23)
BUN/CREAT SERPL: 11.2 (ref 7–25)
CALCIUM SPEC-SCNC: 9.2 MG/DL (ref 8.6–10.5)
CHLORIDE SERPL-SCNC: 107 MMOL/L (ref 98–107)
CLARITY UR: CLEAR
CO2 SERPL-SCNC: 24.6 MMOL/L (ref 22–29)
COLOR UR: YELLOW
CREAT SERPL-MCNC: 1.78 MG/DL (ref 0.76–1.27)
EGFRCR SERPLBLD CKD-EPI 2021: 42.3 ML/MIN/1.73
GLUCOSE SERPL-MCNC: 100 MG/DL (ref 65–99)
GLUCOSE UR STRIP-MCNC: ABNORMAL MG/DL
HGB UR QL STRIP.AUTO: NEGATIVE
HOLD SPECIMEN: NORMAL
KETONES UR QL STRIP: NEGATIVE
LEUKOCYTE ESTERASE UR QL STRIP.AUTO: NEGATIVE
NITRITE UR QL STRIP: NEGATIVE
PH UR STRIP.AUTO: 5.5 [PH] (ref 5–8)
POTASSIUM SERPL-SCNC: 4.4 MMOL/L (ref 3.5–5.2)
PROT UR QL STRIP: NEGATIVE
PSA SERPL-MCNC: 4.21 NG/ML (ref 0–4)
SODIUM SERPL-SCNC: 144 MMOL/L (ref 136–145)
SP GR UR STRIP: 1.02 (ref 1–1.03)
UROBILINOGEN UR QL STRIP: ABNORMAL

## 2024-12-13 PROCEDURE — 36415 COLL VENOUS BLD VENIPUNCTURE: CPT

## 2024-12-13 PROCEDURE — 80048 BASIC METABOLIC PNL TOTAL CA: CPT

## 2024-12-13 PROCEDURE — 81003 URINALYSIS AUTO W/O SCOPE: CPT

## 2024-12-13 NOTE — TELEPHONE ENCOUNTER
I called the patient and talked to his wife about PSA being 4.210.  It is slightly elevated but really no change from about 10 months ago.  Patient is being followed by Dr. Patrick now

## 2024-12-19 ENCOUNTER — OFFICE VISIT (OUTPATIENT)
Dept: FAMILY MEDICINE CLINIC | Facility: CLINIC | Age: 64
End: 2024-12-19
Payer: COMMERCIAL

## 2024-12-19 VITALS
DIASTOLIC BLOOD PRESSURE: 80 MMHG | OXYGEN SATURATION: 94 % | TEMPERATURE: 97.7 F | HEIGHT: 69 IN | HEART RATE: 115 BPM | SYSTOLIC BLOOD PRESSURE: 118 MMHG | WEIGHT: 251 LBS | BODY MASS INDEX: 37.18 KG/M2

## 2024-12-19 DIAGNOSIS — S92.351A CLOSED DISPLACED FRACTURE OF FIFTH METATARSAL BONE OF RIGHT FOOT, INITIAL ENCOUNTER: ICD-10-CM

## 2024-12-19 DIAGNOSIS — M25.531 ACUTE PAIN OF RIGHT WRIST: Primary | ICD-10-CM

## 2024-12-19 NOTE — PROGRESS NOTES
"Chief Complaint  Hand Injury (Right hand injury from almost 2 weeks ago )    Subjective      Ronnie Baron is a 64 y.o. male who presents to [facility]     Right hand injury from almost 2 weeks ago. He was drilling a basketball hoop and his hand slipped and the drill twisted his wrist. It does not hurt unless he squeezes on it. He has some bruising there. On aspirin but no blood thinner.  He was not really that concerned about it but he still has some swelling in the area after 2 weeks along with tenderness so his wife wanted him to be seen.    Objective   Vital Signs:   Vitals:    12/19/24 1649   BP: 118/80   Pulse: 115   Temp: 97.7 °F (36.5 °C)   SpO2: 94%   Weight: 114 kg (251 lb)   Height: 175.3 cm (69\")     Body mass index is 37.07 kg/m².    Wt Readings from Last 3 Encounters:   12/19/24 114 kg (251 lb)   12/12/24 113 kg (249 lb)   06/27/24 109 kg (241 lb)     BP Readings from Last 3 Encounters:   12/19/24 118/80   12/12/24 118/68   06/27/24 126/74       Health Maintenance   Topic Date Due    ANNUAL PHYSICAL  Never done    COVID-19 Vaccine (1 - 2024-25 season) 12/19/2025 (Originally 9/1/2024)    LIPID PANEL  02/07/2025    COLORECTAL CANCER SCREENING  08/03/2025    BMI FOLLOWUP  12/19/2025    TDAP/TD VACCINES (3 - Td or Tdap) 07/01/2026    HEPATITIS C SCREENING  Completed    ZOSTER VACCINE  Completed    Pneumococcal Vaccine 0-64  Aged Out    INFLUENZA VACCINE  Discontinued       Physical Exam  Vitals and nursing note reviewed.   Constitutional:       General: He is not in acute distress.  Cardiovascular:      Rate and Rhythm: Normal rate.   Pulmonary:      Effort: Pulmonary effort is normal. No respiratory distress.   Musculoskeletal:      Comments: Full range of motion of right wrist.  Can fully extend fingers but cannot fully make a fist due to swelling.  Notable swelling of right wrist area compared to left.  No tenderness on palpation of the wrist, hand, or fingers.  He " does have some bruising present across the wrist and distal forearm.   Neurological:      Mental Status: He is alert.   Psychiatric:         Mood and Affect: Mood normal.         Behavior: Behavior normal.          Result Review :  The following data was reviewed by: Ankit Mcdonald MD on 12/19/2024:         Procedures          Assessment & Plan  Acute pain of right wrist    Orders:    XR Wrist 3+ View Right (In Office)    Closed displaced fracture of fifth metatarsal bone of right foot, initial encounter  X-ray obtained in clinic today.  Displaced fracture of right fifth metacarpal.  I recommended a wrist brace but he reports he has 1 at home that fits well and will immobilize that area and he wants to use that which I agreed was acceptable as long as he puts it on as soon as he gets home.  I referred him urgently to orthopedic surgery as well because of the displaced fracture.    Recommended conservative pain management including ice, Tylenol for wrist pain and swelling.  Given CKD and aspirin use I would recommend avoiding NSAID use such as ibuprofen.  Orders:    Ambulatory Referral to Orthopedic Surgery                FOLLOW UP  Return if symptoms worsen or fail to improve.  Patient was given instructions and counseling regarding his condition or for health maintenance advice. Please see specific information pulled into the AVS if appropriate.       Ankit Mcdonald MD  12/19/24  17:13 EST    CURRENT & DISCONTINUED MEDICATIONS  Current Outpatient Medications   Medication Instructions    aspirin 81 mg, Daily    cholecalciferol (VITAMIN D3) 2,000 Units, Daily    clomiPRAMINE (ANAFRANIL) 50 MG capsule No dose, route, or frequency recorded.    Farxiga 10 MG tablet 1 tablet, Daily    fluvoxaMINE (LUVOX) 100 MG tablet No dose, route, or frequency recorded.    gabapentin (NEURONTIN) 600 MG tablet take 1 tablet by mouth TWICE a day    LORazepam (ATIVAN) 0.5 MG tablet No dose, route, or frequency recorded.     Multiple Vitamins-Minerals (MENS MULTI VITAMIN & MINERAL PO) Take  by mouth.    OLANZapine (zyPREXA) 5 MG tablet No dose, route, or frequency recorded.    rosuvastatin (CRESTOR) 10 mg, Oral, Daily, for cholesterol    Synthroid 50 mcg, Oral, Every Morning    tadalafil (CIALIS) 5 mg, Oral, Daily PRN    tamsulosin (FLOMAX) 0.4 mg, Oral, Every Evening, 30 minutes after meal    traZODone (DESYREL) 50 MG tablet No dose, route, or frequency recorded.       There are no discontinued medications.

## 2024-12-20 ENCOUNTER — TELEPHONE (OUTPATIENT)
Dept: FAMILY MEDICINE CLINIC | Facility: CLINIC | Age: 64
End: 2024-12-20
Payer: COMMERCIAL

## 2024-12-23 ENCOUNTER — TELEPHONE (OUTPATIENT)
Dept: ORTHOPEDIC SURGERY | Facility: CLINIC | Age: 64
End: 2024-12-23
Payer: COMMERCIAL

## 2024-12-27 ENCOUNTER — OFFICE VISIT (OUTPATIENT)
Dept: ORTHOPEDIC SURGERY | Facility: CLINIC | Age: 64
End: 2024-12-27
Payer: COMMERCIAL

## 2024-12-27 VITALS
WEIGHT: 251 LBS | SYSTOLIC BLOOD PRESSURE: 107 MMHG | DIASTOLIC BLOOD PRESSURE: 70 MMHG | HEART RATE: 112 BPM | HEIGHT: 69 IN | BODY MASS INDEX: 37.18 KG/M2 | OXYGEN SATURATION: 91 %

## 2024-12-27 DIAGNOSIS — S62.326A CLOSED DISPLACED FRACTURE OF SHAFT OF FIFTH METACARPAL BONE OF RIGHT HAND, INITIAL ENCOUNTER: Primary | ICD-10-CM

## 2024-12-27 NOTE — PROGRESS NOTES
"Chief Complaint  Pain and Initial Evaluation of the Right  Hand    Subjective          Ronnie Baron presents to [facility] for   History of Present Illness    Ronnie presents today for evaluation of his right hand.  He was using a power drill 3 weeks ago to lower a basketball goal and it struck his right hand.  He had immediate pain and swelling.  He did not seek care initially.  He followed up for x-rays on  which revealed a displaced fifth metacarpal fracture.  He has been wearing a brace intermittently.  He feels he has minimal pain at this time.  He has full motion.    No Known Allergies     Social History     Socioeconomic History    Marital status:      Spouse name: Arielle    Years of education: High school   Tobacco Use    Smoking status: Former     Current packs/day: 0.00     Average packs/day: 1 pack/day for 30.0 years (30.0 ttl pk-yrs)     Types: Cigarettes     Start date: 1972     Quit date: 1992     Years since quittin.0     Passive exposure: Past    Smokeless tobacco: Former     Quit date: 1990   Vaping Use    Vaping status: Never Used   Substance and Sexual Activity    Alcohol use: Yes     Comment: rare occasional just pleasure  none since 2016    Drug use: No    Sexual activity: Not Currently     Partners: Female     Birth control/protection: Other        I reviewed the patient's chief complaint, history of present illness, review of systems, past medical history, surgical history, family history, social history, medications, and allergy list.     REVIEW OF SYSTEMS    Constitutional: Denies fevers, chills, weight loss  Cardiovascular: Denies chest pain, shortness of breath  Skin: Denies rashes, acute skin changes  Neurologic: Denies headache, loss of consciousness  MSK: Right hand pain      Objective   Vital Signs:   /70   Pulse 112   Ht 175.3 cm (69\")   Wt 114 kg (251 lb)   SpO2 91%   BMI 37.07 kg/m²     Body mass index is " 37.07 kg/m².    Physical Exam    General: Alert. No acute distress.   Right upper extremity: Mild swelling.  Mild shortening of the fifth metacarpal.  Mild tenderness over the fifth metacarpal shaft.  No gross instability.  Full extension.  Full flexion.  No rotational deformity.  Neurovascular intact.    Procedures    Imaging Results (Most Recent)       None                     Assessment and Plan        XR Wrist 3+ View Right (In Office)    Result Date: 12/23/2024  Narrative: XR WRIST 3+ VW RIGHT Date of Exam: 12/19/2024 4:58 PM EST Indication: right wrist/hand injury Comparison: None available. Findings: Oblique fracture of the mid and distal shaft of the fifth metacarpal is seen, with 3 mm of displacement and 5 mm of foreshortening. No other fractures are visualized. Mild degenerative spurring consistent with osteoarthritis is seen in articulations along the medial aspect of the wrist. No lytic or sclerotic bone lesions are seen. No foreign body is seen.     Impression: Impression: Right wrist series demonstrating oblique fracture of the mid and distal shaft of the fifth metacarpal with 3 mm of displacement and 5 mm of foreshortening. Electronically Signed: Jay Ibarra MD  12/23/2024 9:25 AM EST  Workstation ID: [id number]      Diagnoses and all orders for this visit:    1. Closed displaced fracture of shaft of fifth metacarpal bone of right hand, initial encounter (Primary)        We discussed treatment options at this time.  He is over 3 weeks out from injury and has full motion with minimal pain.  We will continue attempted nonoperative management.  He will continue bracing and marina straps.  Light activity only.  No contact activity.  Follow-up in 2 weeks for reevaluation.  Will obtain new x-rays of the right hand when he returns.      Call or return if worsening symptoms.    Scribed for Shane Reyes MD by Martha Morton MA  12/27/2024   10:52 EST         Follow Up       2 weeks    Patient was given  instructions and counseling regarding his condition or for health maintenance advice. Please see specific information pulled into the AVS if appropriate.       I have personally performed the services described in this document as scribed by the above individual and it is both accurate and complete. Shane Reyes MD 12/27/24 12:16 EST

## 2024-12-30 ENCOUNTER — OFFICE VISIT (OUTPATIENT)
Dept: FAMILY MEDICINE CLINIC | Facility: CLINIC | Age: 64
End: 2024-12-30
Payer: COMMERCIAL

## 2024-12-30 VITALS
TEMPERATURE: 97.3 F | OXYGEN SATURATION: 96 % | HEIGHT: 69 IN | BODY MASS INDEX: 36.88 KG/M2 | SYSTOLIC BLOOD PRESSURE: 140 MMHG | WEIGHT: 249 LBS | DIASTOLIC BLOOD PRESSURE: 90 MMHG | HEART RATE: 105 BPM

## 2024-12-30 DIAGNOSIS — J06.9 UPPER RESPIRATORY INFECTION, ACUTE: Primary | ICD-10-CM

## 2024-12-30 DIAGNOSIS — R05.1 ACUTE COUGH: ICD-10-CM

## 2024-12-30 DIAGNOSIS — R03.0 ELEVATED BLOOD PRESSURE READING: ICD-10-CM

## 2024-12-30 DIAGNOSIS — H61.23 BILATERAL IMPACTED CERUMEN: ICD-10-CM

## 2024-12-30 DIAGNOSIS — R09.81 NASAL CONGESTION: ICD-10-CM

## 2024-12-30 PROCEDURE — 69210 REMOVE IMPACTED EAR WAX UNI: CPT

## 2024-12-30 PROCEDURE — 99214 OFFICE O/P EST MOD 30 MIN: CPT

## 2024-12-30 RX ORDER — PREDNISONE 20 MG/1
20 TABLET ORAL DAILY
Qty: 5 TABLET | Refills: 0 | Status: SHIPPED | OUTPATIENT
Start: 2024-12-30 | End: 2025-01-04

## 2024-12-30 RX ORDER — BROMPHENIRAMINE MALEATE, PSEUDOEPHEDRINE HYDROCHLORIDE, AND DEXTROMETHORPHAN HYDROBROMIDE 2; 30; 10 MG/5ML; MG/5ML; MG/5ML
5 SYRUP ORAL 4 TIMES DAILY PRN
Qty: 473 ML | Refills: 0 | Status: SHIPPED | OUTPATIENT
Start: 2024-12-30

## 2024-12-30 RX ORDER — FLUTICASONE PROPIONATE 50 MCG
2 SPRAY, SUSPENSION (ML) NASAL DAILY
Qty: 16 G | Refills: 0 | Status: SHIPPED | OUTPATIENT
Start: 2024-12-30

## 2024-12-30 RX ORDER — AZITHROMYCIN 250 MG/1
TABLET, FILM COATED ORAL
Qty: 6 TABLET | Refills: 0 | Status: SHIPPED | OUTPATIENT
Start: 2024-12-30

## 2024-12-30 NOTE — PROGRESS NOTES
Chief Complaint  Nasal Congestion (Symptoms 4 days), Cough (Grandkids had pneumonia ), and Wheezing    PHQ-2 Total Score:    PHQ-9 Total Score:      History of Present Illness:  Ronnie Baron is a 64 y.o. male who presents to [facility] with a past medical history of  Past Medical History:   Diagnosis Date    Alteration in appetite     Anxiety     Chronic prostatitis 4/2/2024    CKD (chronic kidney disease) stage 3, GFR 30-59 ml/min     Depression     severe recurrent major depression with psychotic features    DVT (deep venous thrombosis)     Elevated PSA 09/22/22    Enlarged prostate on rectal examination 09/26/2022    Hyperglycemia     Hyperlipidemia     OCD (obsessive compulsive disorder)     TALON on AUTOCPAP 02/26/2019    Moderate severity TALON with AHI 20 events per hour.  Sleep related hypoxia present.    Renal insufficiency     Tachycardia     Vitamin D deficiency         History of Present Illness  The patient is a 64-year-old male who presents today with cough and congestion.    He reports experiencing significant congestion, which he has been attempting to alleviate through coughing, but without success. He also notes the presence of wheezing during his breathing at night, particularly when in a supine position. Additionally, he has been experiencing intermittent episodes of fever and chills, contributing to a general feeling of malaise. He has been in contact with his grandchildren, two of whom have been diagnosed with mycoplasma pneumonia.     He is not diabetic.    He has experienced occasional tinnitus, but has not given it much consideration.    His blood pressure was slightly elevated at 140/90, which is usually normal for him. He has whitecoat syndrome. He is not on any medication for blood pressure. His blood pressure is always 120/80.      Objective   Vital Signs:   Vitals:    12/30/24 1617   BP: 140/90   BP Location: Left arm   Pulse: 105   Temp: 97.3 °F (36.3  "°C)   TempSrc: Temporal   SpO2: 96%   Weight: 113 kg (249 lb)   Height: 175.3 cm (69\")     Body mass index is 36.77 kg/m².    Wt Readings from Last 3 Encounters:   12/30/24 113 kg (249 lb)   12/27/24 114 kg (251 lb)   12/19/24 114 kg (251 lb)     BP Readings from Last 3 Encounters:   12/30/24 140/90   12/27/24 107/70   12/19/24 118/80       Health Maintenance   Topic Date Due    ANNUAL PHYSICAL  Never done    COVID-19 Vaccine (1 - 2024-25 season) 12/19/2025 (Originally 9/1/2024)    LIPID PANEL  02/07/2025    COLORECTAL CANCER SCREENING  08/03/2025    BMI FOLLOWUP  12/19/2025    TDAP/TD VACCINES (3 - Td or Tdap) 07/01/2026    HEPATITIS C SCREENING  Completed    ZOSTER VACCINE  Completed    Pneumococcal Vaccine 0-64  Aged Out    INFLUENZA VACCINE  Discontinued       Review of Systems   Physical Exam  Vitals reviewed.   Constitutional:       Appearance: Normal appearance.   HENT:      Right Ear: There is impacted cerumen.      Left Ear: There is impacted cerumen.      Nose: Congestion present.      Mouth/Throat:      Pharynx: Postnasal drip present.   Eyes:      Pupils: Pupils are equal, round, and reactive to light.   Cardiovascular:      Rate and Rhythm: Normal rate and regular rhythm.   Pulmonary:      Effort: Pulmonary effort is normal.      Breath sounds: Normal breath sounds.   Skin:     General: Skin is warm and dry.   Neurological:      General: No focal deficit present.      Mental Status: He is alert and oriented to person, place, and time.   Psychiatric:         Mood and Affect: Mood normal.            Result Review :  The following data was reviewed by: CONSUELO Harmon on 12/30/2024:  Lab on 12/13/2024   Component Date Value    Color, UA 12/13/2024 Yellow     Appearance, UA 12/13/2024 Clear     pH, UA 12/13/2024 5.5     Specific Gravity, UA 12/13/2024 1.025     Glucose, UA 12/13/2024 >=1000 mg/dL (3+) (A)     Ketones, UA 12/13/2024 Negative     Bilirubin, UA 12/13/2024 Negative     Blood, UA 12/13/2024 " Negative     Protein, UA 12/13/2024 Negative     Leuk Esterase, UA 12/13/2024 Negative     Nitrite, UA 12/13/2024 Negative     Urobilinogen, UA 12/13/2024 0.2 E.U./dL     Glucose 12/13/2024 100 (H)     BUN 12/13/2024 20     Creatinine 12/13/2024 1.78 (H)     Sodium 12/13/2024 144     Potassium 12/13/2024 4.4     Chloride 12/13/2024 107     CO2 12/13/2024 24.6     Calcium 12/13/2024 9.2     BUN/Creatinine Ratio 12/13/2024 11.2     Anion Gap 12/13/2024 12.4     eGFR 12/13/2024 42.3 (L)     Extra Tube 12/13/2024 Hold for add-ons.    Office Visit on 12/12/2024   Component Date Value    Color 12/12/2024 Yellow     Clarity, UA 12/12/2024 Clear     Specific Gravity  12/12/2024 1.025     pH, Urine 12/12/2024 5.5     Leukocytes 12/12/2024 Negative     Nitrite, UA 12/12/2024 Negative     Protein, POC 12/12/2024 Negative     Glucose, UA 12/12/2024 1000 mg/dL (A)     Ketones, UA 12/12/2024 Negative     Urobilinogen, UA 12/12/2024 0.2 E.U./dL     Bilirubin 12/12/2024 Negative     Blood, UA 12/12/2024 Negative     Lot Number 12/12/2024 [id number]     Expiration Date 12/12/2024 10,[id number]     PSA 12/12/2024 4.210 (H)        Results        Ear Cerumen Removal    Date/Time: 12/30/2024 6:09 PM    Performed by: Belle Philippe APRN  Authorized by: Belle Philippe APRN    Anesthesia:  Local Anesthetic: none  Location details: left ear and right ear  Patient tolerance: patient tolerated the procedure well with no immediate complications  Procedure type: instrumentation, irrigation   Sedation:  Patient sedated: no                Assessment and Plan   Diagnoses and all orders for this visit:    1. Upper respiratory infection, acute (Primary)  -     azithromycin (Zithromax) 250 MG tablet; Take as directed  Dispense: 6 tablet; Refill: 0    2. Acute cough    3. Nasal congestion  -     predniSONE (DELTASONE) 20 MG tablet; Take 1 tablet by mouth Daily for 5 days.  Dispense: 5 tablet; Refill: 0  -     brompheniramine-pseudoephedrine- 30-2-10  MG/5ML syrup; Take 5 mL by mouth 4 (Four) Times a Day As Needed for Congestion or Cough.  Dispense: 473 mL; Refill: 0  -     fluticasone (FLONASE) 50 MCG/ACT nasal spray; Administer 2 sprays into the nostril(s) as directed by provider Daily.  Dispense: 16 g; Refill: 0    4. Bilateral impacted cerumen  -     carbamide peroxide (Debrox) 6.5 % otic solution; Administer 5 drops into both ears 2 (Two) Times a Day.  Dispense: 15 mL; Refill: 0  -     Ear Cerumen Removal    5. Elevated blood pressure reading        Assessment & Plan  1. Upper respiratory infection.  He reports significant congestion and difficulty coughing up mucus, along with wheezing at night. He has been in contact with grandchildren who had mycoplasma pneumonia. Azithromycin and prednisone have been prescribed to address the potential mycoplasma infection and reduce inflammation. Bromfed has been prescribed to help with congestion. All prescriptions will be sent to [facility].    2. Bilateral ear occlusion.  His ears are completely occluded, causing hearing issues and occasional dizziness. Cerumen removal performed in office. Able to clear left ear, right ear still had debris. Debrox has been prescribed to facilitate the breakdown of impacted cerumen. Flonase has been prescribed to help dry up any residual fluid in the ears. He is advised to use 5 drops of Debrox in each ear and 2 sprays of Flonase in each nostril. If symptoms persist, he should return for further evaluation.    3. Elevated blood pressure.  His blood pressure was recorded at 140/90, which is higher than his usual 120/80. He is not currently on any blood pressure medication and reports having whitecoat syndrome. No immediate intervention is required, but monitoring is advised.        FOLLOW UP  Return if symptoms worsen or fail to improve.    Patient was given instructions and counseling regarding his condition or for health maintenance advice. Please see specific information pulled into  the AVS if appropriate.       CONSUELO Harmon  12/30/24  18:11 EST    CURRENT & DISCONTINUED MEDICATIONS  Current Outpatient Medications   Medication Instructions    aspirin 81 mg, Daily    azithromycin (Zithromax) 250 MG tablet Take as directed    brompheniramine-pseudoephedrine-DM 30-2-10 MG/5ML syrup 5 mL, Oral, 4 Times Daily PRN    carbamide peroxide (Debrox) 6.5 % otic solution 5 drops, Both Ears, 2 Times Daily    cholecalciferol (VITAMIN D3) 2,000 Units, Daily    clomiPRAMINE (ANAFRANIL) 50 MG capsule No dose, route, or frequency recorded.    Farxiga 10 MG tablet 1 tablet, Daily    fluticasone (FLONASE) 50 MCG/ACT nasal spray 2 sprays, Nasal, Daily    fluvoxaMINE (LUVOX) 100 MG tablet No dose, route, or frequency recorded.    gabapentin (NEURONTIN) 600 MG tablet take 1 tablet by mouth TWICE a day    LORazepam (ATIVAN) 0.5 MG tablet No dose, route, or frequency recorded.    Multiple Vitamins-Minerals (MENS MULTI VITAMIN & MINERAL PO) Take  by mouth.    OLANZapine (zyPREXA) 5 MG tablet No dose, route, or frequency recorded.    predniSONE (DELTASONE) 20 mg, Oral, Daily    rosuvastatin (CRESTOR) 10 mg, Oral, Daily, for cholesterol    Synthroid 50 mcg, Oral, Every Morning    tadalafil (CIALIS) 5 mg, Oral, Daily PRN    tamsulosin (FLOMAX) 0.4 mg, Oral, Every Evening, 30 minutes after meal    traZODone (DESYREL) 50 MG tablet No dose, route, or frequency recorded.       There are no discontinued medications.     EMR Dragon/Transcription disclaimer:  Parts of this encounter note are electronic transcription/translation of spoken language to printed text.     Patient or patient representative verbalized consent for the use of Ambient Listening during the visit with  CONSUELO Harmon for chart documentation. 12/30/2024  16:52 EST

## 2025-01-10 ENCOUNTER — OFFICE VISIT (OUTPATIENT)
Dept: ORTHOPEDIC SURGERY | Facility: CLINIC | Age: 65
End: 2025-01-10
Payer: COMMERCIAL

## 2025-01-10 VITALS
BODY MASS INDEX: 36.88 KG/M2 | HEIGHT: 69 IN | DIASTOLIC BLOOD PRESSURE: 81 MMHG | WEIGHT: 249 LBS | SYSTOLIC BLOOD PRESSURE: 126 MMHG

## 2025-01-10 DIAGNOSIS — S62.326A CLOSED DISPLACED FRACTURE OF SHAFT OF FIFTH METACARPAL BONE OF RIGHT HAND, INITIAL ENCOUNTER: ICD-10-CM

## 2025-01-10 DIAGNOSIS — M79.641 RIGHT HAND PAIN: Primary | ICD-10-CM

## 2025-01-10 NOTE — PROGRESS NOTES
"Chief Complaint  Follow-up and Pain of the Right Hand    Subjective          Ronnie Baron presents to [facility] for   History of Present Illness    Ronnie returns for follow-up of his right hand.  He has a right fifth metacarpal fracture we are treating nonoperatively.  He reports he is doing well overall.  He does not of any pain at this time.  No new injuries.    No Known Allergies     Social History     Socioeconomic History    Marital status:      Spouse name: Arielle    Years of education: High school   Tobacco Use    Smoking status: Former     Current packs/day: 0.00     Average packs/day: 1 pack/day for 30.0 years (30.0 ttl pk-yrs)     Types: Cigarettes     Start date: 1972     Quit date: 1992     Years since quittin.0     Passive exposure: Past    Smokeless tobacco: Former     Quit date: 1990   Vaping Use    Vaping status: Never Used   Substance and Sexual Activity    Alcohol use: Yes     Comment: rare occasional just pleasure  none since 2016    Drug use: No    Sexual activity: Not Currently     Partners: Female     Birth control/protection: Other        I reviewed the patient's chief complaint, history of present illness, review of systems, past medical history, surgical history, family history, social history, medications, and allergy list.     REVIEW OF SYSTEMS    Constitutional: Denies fevers, chills, weight loss  Cardiovascular: Denies chest pain, shortness of breath  Skin: Denies rashes, acute skin changes  Neurologic: Denies headache, loss of consciousness  MSK: Right hand pain      Objective   Vital Signs:   /81   Ht 175.3 cm (69\")   Wt 113 kg (249 lb)   BMI 36.77 kg/m²     Body mass index is 36.77 kg/m².    Physical Exam    General: Alert. No acute distress.   Right upper extremity: No swelling.  Palpable callus formation over the fracture site.  Nontender over the fracture site.  Full finger range of motion.  No deformity.  " Neurovascular intact.    Procedures    Imaging Results (Most Recent)       Procedure Component Value Units Date/Time    XR Hand 2 View Right [[id number]] Resulted: 01/10/25 0814     Updated: 01/10/25 0814    Narrative:      Indications: Follow-up right fifth metacarpal fracture    Views: AP and lateral right hand    Findings: Healing fifth metacarpal fracture with stable alignment.    Increasing callus formation noted.    Comparative Data: Comparative data found and reviewed today                     Assessment and Plan        XR Hand 2 View Right    Result Date: 1/10/2025  Narrative: Indications: Follow-up right fifth metacarpal fracture Views: AP and lateral right hand Findings: Healing fifth metacarpal fracture with stable alignment.  Increasing callus formation noted. Comparative Data: Comparative data found and reviewed today    XR Wrist 3+ View Right (In Office)    Result Date: 12/23/2024  Narrative: XR WRIST 3+ VW RIGHT Date of Exam: 12/19/2024 4:58 PM EST Indication: right wrist/hand injury Comparison: None available. Findings: Oblique fracture of the mid and distal shaft of the fifth metacarpal is seen, with 3 mm of displacement and 5 mm of foreshortening. No other fractures are visualized. Mild degenerative spurring consistent with osteoarthritis is seen in articulations along the medial aspect of the wrist. No lytic or sclerotic bone lesions are seen. No foreign body is seen.     Impression: Impression: Right wrist series demonstrating oblique fracture of the mid and distal shaft of the fifth metacarpal with 3 mm of displacement and 5 mm of foreshortening. Electronically Signed: Jay Ibarra MD  12/23/2024 9:25 AM EST  Workstation ID: [id number]      Diagnoses and all orders for this visit:    1. Right hand pain (Primary)  -     XR Hand 2 View Right    2. Closed displaced fracture of shaft of fifth metacarpal bone of right hand, initial encounter        We reviewed his imaging.  His fracture shows signs of  healing.  He has no pain and full motion.  Continue light activity as tolerated.  Follow-up in 3 weeks for reevaluation.  We will obtain new x-rays of the right hand when he returns.        Call or return if worsening symptoms.    Scribed for Shane Reyes MD by Martha Morton MA  01/10/2025   07:56 EST         Follow Up       3 weeks    Patient was given instructions and counseling regarding his condition or for health maintenance advice. Please see specific information pulled into the AVS if appropriate.       I have personally performed the services described in this document as scribed by the above individual and it is both accurate and complete. Shane Reyes MD 01/10/25 08:16 EST

## 2025-01-13 DIAGNOSIS — E78.2 MIXED HYPERLIPIDEMIA: Chronic | ICD-10-CM

## 2025-01-13 DIAGNOSIS — E03.9 HYPOTHYROIDISM, ACQUIRED: ICD-10-CM

## 2025-01-13 RX ORDER — ROSUVASTATIN CALCIUM 10 MG/1
10 TABLET, COATED ORAL DAILY
Qty: 90 TABLET | Refills: 0 | Status: SHIPPED | OUTPATIENT
Start: 2025-01-13 | End: 2025-01-15 | Stop reason: SDUPTHER

## 2025-01-13 RX ORDER — LEVOTHYROXINE SODIUM 50 MCG
50 TABLET ORAL EVERY MORNING
Qty: 90 TABLET | Refills: 0 | Status: SHIPPED | OUTPATIENT
Start: 2025-01-13 | End: 2025-01-15 | Stop reason: SDUPTHER

## 2025-01-15 ENCOUNTER — OFFICE VISIT (OUTPATIENT)
Dept: FAMILY MEDICINE CLINIC | Facility: CLINIC | Age: 65
End: 2025-01-15
Payer: COMMERCIAL

## 2025-01-15 VITALS
WEIGHT: 249 LBS | BODY MASS INDEX: 36.88 KG/M2 | HEART RATE: 94 BPM | TEMPERATURE: 97.3 F | DIASTOLIC BLOOD PRESSURE: 72 MMHG | SYSTOLIC BLOOD PRESSURE: 128 MMHG | HEIGHT: 69 IN | OXYGEN SATURATION: 93 %

## 2025-01-15 DIAGNOSIS — E78.2 MIXED HYPERLIPIDEMIA: ICD-10-CM

## 2025-01-15 DIAGNOSIS — R73.03 PREDIABETES: ICD-10-CM

## 2025-01-15 DIAGNOSIS — E03.9 HYPOTHYROIDISM, ACQUIRED: ICD-10-CM

## 2025-01-15 DIAGNOSIS — Z00.00 ANNUAL PHYSICAL EXAM: Primary | ICD-10-CM

## 2025-01-15 LAB
ALBUMIN SERPL-MCNC: 4.1 G/DL (ref 3.5–5.2)
ALBUMIN/GLOB SERPL: 1.4 G/DL
ALP SERPL-CCNC: 137 U/L (ref 39–117)
ALT SERPL W P-5'-P-CCNC: 33 U/L (ref 1–41)
ANION GAP SERPL CALCULATED.3IONS-SCNC: 9.5 MMOL/L (ref 5–15)
AST SERPL-CCNC: 29 U/L (ref 1–40)
BASOPHILS # BLD AUTO: 0.06 10*3/MM3 (ref 0–0.2)
BASOPHILS NFR BLD AUTO: 0.9 % (ref 0–1.5)
BILIRUB SERPL-MCNC: 0.4 MG/DL (ref 0–1.2)
BUN SERPL-MCNC: 18 MG/DL (ref 8–23)
BUN/CREAT SERPL: 10.5 (ref 7–25)
CALCIUM SPEC-SCNC: 9.4 MG/DL (ref 8.6–10.5)
CHLORIDE SERPL-SCNC: 103 MMOL/L (ref 98–107)
CHOLEST SERPL-MCNC: 193 MG/DL (ref 0–200)
CO2 SERPL-SCNC: 27.5 MMOL/L (ref 22–29)
CREAT SERPL-MCNC: 1.72 MG/DL (ref 0.76–1.27)
DEPRECATED RDW RBC AUTO: 44.6 FL (ref 37–54)
EGFRCR SERPLBLD CKD-EPI 2021: 44.1 ML/MIN/1.73
EOSINOPHIL # BLD AUTO: 0.1 10*3/MM3 (ref 0–0.4)
EOSINOPHIL NFR BLD AUTO: 1.5 % (ref 0.3–6.2)
ERYTHROCYTE [DISTWIDTH] IN BLOOD BY AUTOMATED COUNT: 13.1 % (ref 12.3–15.4)
GLOBULIN UR ELPH-MCNC: 3 GM/DL
GLUCOSE SERPL-MCNC: 115 MG/DL (ref 65–99)
HBA1C MFR BLD: 6.1 % (ref 4.8–5.6)
HCT VFR BLD AUTO: 51.6 % (ref 37.5–51)
HDLC SERPL-MCNC: 63 MG/DL (ref 40–60)
HGB BLD-MCNC: 16.5 G/DL (ref 13–17.7)
IMM GRANULOCYTES # BLD AUTO: 0.03 10*3/MM3 (ref 0–0.05)
IMM GRANULOCYTES NFR BLD AUTO: 0.5 % (ref 0–0.5)
LDLC SERPL CALC-MCNC: 105 MG/DL (ref 0–100)
LDLC/HDLC SERPL: 1.61 {RATIO}
LYMPHOCYTES # BLD AUTO: 2.56 10*3/MM3 (ref 0.7–3.1)
LYMPHOCYTES NFR BLD AUTO: 38.7 % (ref 19.6–45.3)
MCH RBC QN AUTO: 29.5 PG (ref 26.6–33)
MCHC RBC AUTO-ENTMCNC: 32 G/DL (ref 31.5–35.7)
MCV RBC AUTO: 92.1 FL (ref 79–97)
MONOCYTES # BLD AUTO: 0.47 10*3/MM3 (ref 0.1–0.9)
MONOCYTES NFR BLD AUTO: 7.1 % (ref 5–12)
NEUTROPHILS NFR BLD AUTO: 3.4 10*3/MM3 (ref 1.7–7)
NEUTROPHILS NFR BLD AUTO: 51.3 % (ref 42.7–76)
NRBC BLD AUTO-RTO: 0 /100 WBC (ref 0–0.2)
PLATELET # BLD AUTO: 240 10*3/MM3 (ref 140–450)
PMV BLD AUTO: 10.9 FL (ref 6–12)
POTASSIUM SERPL-SCNC: 4.1 MMOL/L (ref 3.5–5.2)
PROT SERPL-MCNC: 7.1 G/DL (ref 6–8.5)
RBC # BLD AUTO: 5.6 10*6/MM3 (ref 4.14–5.8)
SODIUM SERPL-SCNC: 140 MMOL/L (ref 136–145)
T4 FREE SERPL-MCNC: 1.39 NG/DL (ref 0.92–1.68)
TRIGL SERPL-MCNC: 144 MG/DL (ref 0–150)
TSH SERPL DL<=0.05 MIU/L-ACNC: 4.55 UIU/ML (ref 0.27–4.2)
VLDLC SERPL-MCNC: 25 MG/DL (ref 5–40)
WBC NRBC COR # BLD AUTO: 6.62 10*3/MM3 (ref 3.4–10.8)

## 2025-01-15 PROCEDURE — 99396 PREV VISIT EST AGE 40-64: CPT | Performed by: NURSE PRACTITIONER

## 2025-01-15 PROCEDURE — 83036 HEMOGLOBIN GLYCOSYLATED A1C: CPT | Performed by: NURSE PRACTITIONER

## 2025-01-15 PROCEDURE — 84439 ASSAY OF FREE THYROXINE: CPT | Performed by: NURSE PRACTITIONER

## 2025-01-15 PROCEDURE — 99214 OFFICE O/P EST MOD 30 MIN: CPT | Performed by: NURSE PRACTITIONER

## 2025-01-15 PROCEDURE — 80061 LIPID PANEL: CPT | Performed by: NURSE PRACTITIONER

## 2025-01-15 PROCEDURE — 80050 GENERAL HEALTH PANEL: CPT | Performed by: NURSE PRACTITIONER

## 2025-01-15 RX ORDER — LEVOTHYROXINE SODIUM 50 UG/1
50 TABLET ORAL EVERY MORNING
Qty: 90 TABLET | Refills: 1 | Status: SHIPPED | OUTPATIENT
Start: 2025-01-15

## 2025-01-15 RX ORDER — ROSUVASTATIN CALCIUM 10 MG/1
10 TABLET, COATED ORAL DAILY
Qty: 90 TABLET | Refills: 1 | Status: SHIPPED | OUTPATIENT
Start: 2025-01-15

## 2025-01-15 NOTE — PROGRESS NOTES
Chief Complaint  Obesity (Medication refills.)    Subjective            Ronnie Baron presents to [facility]  History of Present Illness  Patient is here today he is due for his annual from the primary care standpoint and also for his medication refills on the chronic comorbid conditions managed from the primary care standpoint and to obtain fasting labs    He does see his specialists Dr. Clark who is his nephrologist  Patient also sees urology as well  He also sees his mental health provider regularly  And he is also seeing an orthopedist  Patient also saw cardiology in past     Patient reports that he stopped the Wegovy completely approximately 3 to 4 months ago as he was getting lightheaded with this and he had started this originally in March 2024 and did very well for several months and we had finally titrated him to the 1 mg once weekly by June of last year and then with the weight loss later in the year he finally had to stop it because of the lightheadedness and now he is just managing his weight with diet    --Hypothyroidism: Tolerating medication well without side effects no issues reported overall stable with no hair or nail skin issues reported    --Dyslipidemia: Tolerating medication well with no side effects no issues reported no myalgias reported overall stable    --Prediabetes: Patient has faithfully been doing a healthy diet portion control staying active and has lost weight and maintained the weight loss thus far and we will check A1c today        History of Present Illness  The patient is a 64-year-old male who presents for medication refills.    He has discontinued the use of Wegovy due to experiencing dizziness and near syncope episodes. He reports no unexpected weight changes, shortness of breath, chest pain, abdominal pain, hematochezia, dizziness, lightheadedness, syncope, seizures, dysphagia, polydipsia, polyphagia, polyuria, dysuria, difficulty  urinating, blurred vision, diplopia, or any other pain symptoms. He maintains a diet of approximately 1500 calories per day, although he does not strictly monitor his caloric intake. He ensures adequate hydration by consuming ample water. He is currently on Synthroid and rosuvastatin, with all other medications managed by Dr. Che. He had a consultation with Dr. Roach in 2024, during which blood work was conducted. He is scheduled to see an orthopedic specialist for hip issues next week. He also has a history of hand fracture, which is currently healing under the supervision of Dr. Patricia.    SOCIAL HISTORY  He quit smoking in .    MEDICATIONS  Current: Synthroid, rosuvastatin, Farxiga  Discontinued: Wegovy      PHQ-2 Total Score: 0  PHQ-9 Total Score:      Past Medical History:   Diagnosis Date    Alteration in appetite     Anxiety     Chronic prostatitis 2024    CKD (chronic kidney disease) stage 3, GFR 30-59 ml/min     Depression     severe recurrent major depression with psychotic features    DVT (deep venous thrombosis)     Elevated PSA 22    Enlarged prostate on rectal examination 2022    Hyperglycemia     Hyperlipidemia     OCD (obsessive compulsive disorder)     TALON on AUTOCPAP 2019    Moderate severity TALON with AHI 20 events per hour.  Sleep related hypoxia present.    Renal insufficiency     Tachycardia     Vitamin D deficiency        No Known Allergies     Past Surgical History:   Procedure Laterality Date    CYSTOSCOPY      ELBOW PROCEDURE  2014        Social History     Tobacco Use    Smoking status: Former     Current packs/day: 0.00     Average packs/day: 1 pack/day for 30.0 years (30.0 ttl pk-yrs)     Types: Cigarettes     Start date: 1972     Quit date: 1992     Years since quittin.0     Passive exposure: Past    Smokeless tobacco: Former     Quit date: 1990   Vaping Use    Vaping status: Never Used   Substance Use Topics    Alcohol use: Yes      Comment: rare occasional just pleasure  none since April 2016    Drug use: No       Family History   Problem Relation Age of Onset    Hypertension Mother     Stroke Mother         November 2016 with two blood clots    Depression Mother         takes welbutrin    Kidney disease Mother     Hypertension Father     Depression Father         takes medicine care give of wife    Heart disease Maternal Grandfather     Cancer Maternal Grandmother         Health Maintenance Due   Topic Date Due    ANNUAL PHYSICAL  Never done    LIPID PANEL  02/07/2025        Current Outpatient Medications on File Prior to Visit   Medication Sig    aspirin 81 MG EC tablet Take 1 tablet by mouth Daily.    carbamide peroxide (Debrox) 6.5 % otic solution Administer 5 drops into both ears 2 (Two) Times a Day.    clomiPRAMINE (ANAFRANIL) 50 MG capsule     Farxiga 10 MG tablet Take 10 mg by mouth Daily.    fluticasone (FLONASE) 50 MCG/ACT nasal spray Administer 2 sprays into the nostril(s) as directed by provider Daily.    fluvoxaMINE (LUVOX) 100 MG tablet     gabapentin (NEURONTIN) 600 MG tablet take 1 tablet by mouth TWICE a day    LORazepam (ATIVAN) 0.5 MG tablet     Multiple Vitamins-Minerals (MENS MULTI VITAMIN & MINERAL PO) Take  by mouth.    OLANZapine (zyPREXA) 5 MG tablet     tadalafil (CIALIS) 5 MG tablet Take 1 tablet by mouth Daily As Needed for Erectile Dysfunction for up to 180 days.    tamsulosin (FLOMAX) 0.4 MG capsule 24 hr capsule Take 1 capsule by mouth Every Evening for 180 days. 30 minutes after meal    traZODone (DESYREL) 50 MG tablet     Vitamin D, Cholecalciferol, 1000 UNITS tablet Take 2 tablets by mouth Daily.    [DISCONTINUED] levothyroxine (Synthroid) 50 MCG tablet TAKE 1 TABLET EVERY MORNING    [DISCONTINUED] rosuvastatin (CRESTOR) 10 MG tablet TAKE 1 TABLET DAILY FOR    CHOLESTEROL    [DISCONTINUED] azithromycin (Zithromax) 250 MG tablet Take as directed (Patient not taking: Reported on 1/10/2025)    [DISCONTINUED]  "brompheniramine-pseudoephedrine-DM 30-2-10 MG/5ML syrup Take 5 mL by mouth 4 (Four) Times a Day As Needed for Congestion or Cough. (Patient not taking: Reported on 1/15/2025)     No current facility-administered medications on file prior to visit.       Immunization History   Administered Date(s) Administered    Shingrix 07/17/2019, 10/23/2019    Td (TDVAX) 07/06/1998    Tdap 07/01/2016       Review of Systems   Constitutional:  Negative for fatigue, unexpected weight gain and unexpected weight loss.   HENT:  Negative for trouble swallowing.    Eyes:  Negative for blurred vision and double vision.   Respiratory:  Negative for shortness of breath.    Cardiovascular:  Negative for chest pain.   Gastrointestinal:  Negative for abdominal pain and blood in stool.   Endocrine: Negative for polydipsia, polyphagia and polyuria.   Genitourinary:  Negative for difficulty urinating and dysuria.   Musculoskeletal:  Positive for arthralgias.        Some hip issues and hand issues    Neurological:  Negative for dizziness, seizures, syncope and light-headedness.   Psychiatric/Behavioral:  Negative for self-injury and suicidal ideas.         Objective     /72   Pulse 94   Temp 97.3 °F (36.3 °C) (Temporal)   Ht 175.3 cm (69\")   Wt 113 kg (249 lb)   SpO2 93%   BMI 36.77 kg/m²       Physical Exam  Vitals and nursing note reviewed. Exam conducted with a chaperone present (wife).   Constitutional:       Appearance: Normal appearance.   HENT:      Head: Normocephalic.      Right Ear: External ear normal.      Left Ear: External ear normal.      Nose: Nose normal.      Mouth/Throat:      Mouth: Mucous membranes are moist.   Eyes:      Pupils: Pupils are equal, round, and reactive to light.   Cardiovascular:      Rate and Rhythm: Normal rate and regular rhythm.      Heart sounds: Normal heart sounds.   Pulmonary:      Effort: Pulmonary effort is normal.      Breath sounds: Normal breath sounds.   Abdominal:      Palpations: " Abdomen is soft.      Tenderness: There is no abdominal tenderness.   Musculoskeletal:         General: Normal range of motion.      Cervical back: Normal range of motion and neck supple.   Skin:     General: Skin is warm and dry.   Neurological:      Mental Status: He is alert and oriented to person, place, and time.   Psychiatric:         Mood and Affect: Mood normal.         Behavior: Behavior normal.         Thought Content: Thought content normal.         Judgment: Judgment normal.         Result Review :                    Results                 Assessment and Plan      Diagnoses and all orders for this visit:    1. Annual physical exam (Primary)  -     CBC & Differential  -     Comprehensive Metabolic Panel  -     Lipid Panel  -     TSH Rfx On Abnormal To Free T4  -     Hemoglobin A1c    2. Mixed hyperlipidemia  -     rosuvastatin (CRESTOR) 10 MG tablet; Take 1 tablet by mouth Daily. for cholesterol  Dispense: 90 tablet; Refill: 1  -     CBC & Differential  -     Comprehensive Metabolic Panel  -     Lipid Panel  -     TSH Rfx On Abnormal To Free T4  -     Hemoglobin A1c    3. Hypothyroidism, acquired  -     levothyroxine (Synthroid) 50 MCG tablet; Take 1 tablet by mouth Every Morning.  Dispense: 90 tablet; Refill: 1  -     CBC & Differential  -     Comprehensive Metabolic Panel  -     Lipid Panel  -     TSH Rfx On Abnormal To Free T4  -     Hemoglobin A1c    4. Prediabetes  -     CBC & Differential  -     Comprehensive Metabolic Panel  -     Lipid Panel  -     TSH Rfx On Abnormal To Free T4  -     Hemoglobin A1c          Assessment & Plan  1. Annual wellness and Medication refills.  His blood pressure readings are within the normal range. His weight has remained stable since discontinuing Wegovy. He has not experienced any unexpected weight changes, shortness of breath, chest pain, abdominal pain, blood in stool, dizziness, lightheadedness, fainting, seizures, trouble swallowing, excessive thirst, hunger, or  urination. He drinks plenty of water due to his medication regimen. Prescriptions for Synthroid and rosuvastatin will be renewed and sent to [facility]. A comprehensive panel, including liver function tests, will be ordered. Additionally, a CBC and A1c test will be conducted.    2.  Hypothyroidism medication refilled and labs today    3.  Dyslipidemia medication refilled and labs today    4.  Prediabetes labs today    Follow-up  The patient will follow up in 6 months.          Follow Up     Return in about 6 months (around 7/15/2025), or if symptoms worsen or fail to improve, for Recheck, fasting labs and refills.    Patient was given instructions and counseling regarding his condition or for health maintenance advice. Please see specific information pulled into the AVS if appropriate.            Ronnie Baron  reports that he quit smoking about 33 years ago. His smoking use included cigarettes. He started smoking about 53 years ago. He has a 30 pack-year smoking history. He has been exposed to tobacco smoke. He quit smokeless tobacco use about 34 years ago. I have educated him on the risk of diseases from using tobacco products such as cancer, COPD, and heart disease.                Patient or patient representative verbalized consent for the use of Ambient Listening during the visit with  CONSUELO Coronel for chart documentation. 1/15/2025  08:16 EST

## 2025-01-15 NOTE — PROGRESS NOTES
..  Venipuncture Blood Specimen Collection  Venipuncture performed in Lt arm by Karissa James MA with good hemostasis. Patient tolerated the procedure well without complications.   01/15/25   Karissa James MA

## 2025-01-16 DIAGNOSIS — E03.9 HYPOTHYROIDISM, ACQUIRED: Primary | ICD-10-CM

## 2025-01-16 NOTE — PROGRESS NOTES
Please mail letter to patient stating    Ronnie hemoglobin A1c is 6.1% still in the prediabetic range and still showing good control of the diabetes that was diagnosed a year ago-as the goal is to be below 7% and then the thyroid levels were modestly elevated at 4.5 x 0 and should be 4.200 or less and the free T4 was normal range and I would like to spot check this TSH level in 1 month nonfasting to see if a dose adjustment would be indicated or not since is just borderline and I will drop in that order    And the comprehensive panel shows glucose 115 and normal electrolytes and kidney function test still show consistent with stable chronic kidney disease stage III and then the liver function tests were all normal with the exception of the alkaline phosphatase modestly elevated at 137 and normal is 117 or less    Cholesterol panel shows everything excellent with the exception of the LDL modestly elevated at 105 and should be less than 100 when fasting and for diabetics the goal is actually 70 or less and your blood counts were in good range other than modestly elevated hematocrit

## 2025-01-22 ENCOUNTER — OFFICE VISIT (OUTPATIENT)
Dept: ORTHOPEDIC SURGERY | Facility: CLINIC | Age: 65
End: 2025-01-22
Payer: COMMERCIAL

## 2025-01-22 VITALS
HEART RATE: 99 BPM | SYSTOLIC BLOOD PRESSURE: 148 MMHG | HEIGHT: 69 IN | DIASTOLIC BLOOD PRESSURE: 89 MMHG | OXYGEN SATURATION: 93 % | WEIGHT: 249 LBS | BODY MASS INDEX: 36.88 KG/M2

## 2025-01-22 DIAGNOSIS — M16.12 PRIMARY OSTEOARTHRITIS OF LEFT HIP: ICD-10-CM

## 2025-01-22 DIAGNOSIS — M70.62 TROCHANTERIC BURSITIS OF LEFT HIP: ICD-10-CM

## 2025-01-22 DIAGNOSIS — M25.552 LEFT HIP PAIN: Primary | ICD-10-CM

## 2025-01-22 NOTE — PROGRESS NOTES
Chief Complaint  Initial Evaluation of the Left Hip    Subjective          Ronnie Baron presents to [facility] for an evaluation  of his left hip.     History of Present Illness    The patient presents here today for an evaluation  of his left hip. His left hip has been bothering him for several years but has gradually gotten worse. He has been seen by an orthopedic doctor in the past where he has had injections done in the joint and bursa injections. He locates his pain to the lateral  hip and pain at nighttime. He denies any specific injury, trauma, falls or surgery. His last injection was in September. He has pain with prolonged walking and feels like his hip gives out on him.     No Known Allergies     Social History     Socioeconomic History    Marital status:      Spouse name: Arielle    Years of education: High school   Tobacco Use    Smoking status: Former     Current packs/day: 0.00     Average packs/day: 1 pack/day for 30.0 years (30.0 ttl pk-yrs)     Types: Cigarettes     Start date: 1972     Quit date: 1992     Years since quittin.0     Passive exposure: Past    Smokeless tobacco: Former     Quit date: 1990   Vaping Use    Vaping status: Never Used   Substance and Sexual Activity    Alcohol use: Yes     Comment: rare occasional just pleasure  none since 2016    Drug use: No    Sexual activity: Not Currently     Partners: Female     Birth control/protection: Other        I reviewed the patient's chief complaint, history of present illness, review of systems, past medical history, surgical history, family history, social history, medications, and allergy list.     REVIEW OF SYSTEMS    Constitutional: Denies fevers, chills, weight loss  Cardiovascular: Denies chest pain, shortness of breath  Skin: Denies rashes, acute skin changes  Neurologic: Denies headache, loss of consciousness  MSK: left hip pain       Objective   Vital Signs:   /89    "Pulse 99   Ht 175.3 cm (69.02\")   Wt 113 kg (249 lb)   SpO2 93%   BMI 36.75 kg/m²     Body mass index is 36.75 kg/m².    Physical Exam    General: Alert. No acute distress.   Left lower extremity: non tender to the lateral  hip, hip flexion to 90 degrees, internal rotation to 20 degrees with pain, external rotation  to 40 degrees, full knee extension, flexion to 130 degrees, 5/5 hip flexion and abduction, distal neurovascularly intact, calf soft, positive  pulses, positive EHL, FHL, GS, and TA. Sensation intact to all 5 nerves of the foot.     Procedures    Imaging Results (Most Recent)       Procedure Component Value Units Date/Time    XR Hip With or Without Pelvis 2 - 3 View Left [[id number]] Resulted: 01/22/25 0925     Updated: 01/22/25 0925    Narrative:      Indications: Left hip pain    Views: AP pelvis, AP and frog lateral left hip    Findings: Mild to moderate left hip degenerative changes.  No fractures   noted.  Degenerative changes are also seen in the lumbar spine.  Hip   reduced.    Comparative Data: No comparative data available                     Assessment and Plan        XR Hip With or Without Pelvis 2 - 3 View Left    Result Date: 1/22/2025  Narrative: Indications: Left hip pain Views: AP pelvis, AP and frog lateral left hip Findings: Mild to moderate left hip degenerative changes.  No fractures noted.  Degenerative changes are also seen in the lumbar spine.  Hip reduced. Comparative Data: No comparative data available    XR Hand 2 View Right    Result Date: 1/10/2025  Narrative: Indications: Follow-up right fifth metacarpal fracture Views: AP and lateral right hand Findings: Healing fifth metacarpal fracture with stable alignment.  Increasing callus formation noted. Comparative Data: Comparative data found and reviewed today      Diagnoses and all orders for this visit:    1. Left hip pain (Primary)  -     XR Hip With or Without Pelvis 2 - 3 View Left  -     FL Guide For Pain Meds Injection " Joint; Future    2. Primary osteoarthritis of left hip  -     FL Guide For Pain Meds Injection Joint; Future    3. Trochanteric bursitis of left hip  -     FL Guide For Pain Meds Injection Joint; Future        The patient presents here today for an evaluation  of his left hip. X-rays were obtained in the office today and these were reviewed today.     Order placed today for a left hip intra articular injection done with radiology.     Home exercises given today and will continue current medications for pain control.     Call or return if worsening symptoms.    Scribed for Shane Reyes MD by Liz Garay  01/22/2025   09:12 EST         Follow Up       3 months     Patient was given instructions and counseling regarding his condition or for health maintenance advice. Please see specific information pulled into the AVS if appropriate.       I have personally performed the services described in this document as scribed by the above individual and it is both accurate and complete. Shane Reyes MD 01/22/25 10:07 EST

## 2025-02-03 ENCOUNTER — OFFICE VISIT (OUTPATIENT)
Dept: ORTHOPEDIC SURGERY | Facility: CLINIC | Age: 65
End: 2025-02-03
Payer: COMMERCIAL

## 2025-02-03 VITALS
OXYGEN SATURATION: 91 % | SYSTOLIC BLOOD PRESSURE: 141 MMHG | BODY MASS INDEX: 36.88 KG/M2 | DIASTOLIC BLOOD PRESSURE: 96 MMHG | WEIGHT: 249 LBS | HEIGHT: 69 IN | HEART RATE: 97 BPM

## 2025-02-03 DIAGNOSIS — M79.641 RIGHT HAND PAIN: Primary | ICD-10-CM

## 2025-02-03 DIAGNOSIS — S62.326A CLOSED DISPLACED FRACTURE OF SHAFT OF FIFTH METACARPAL BONE OF RIGHT HAND, INITIAL ENCOUNTER: ICD-10-CM

## 2025-02-03 NOTE — PROGRESS NOTES
"Chief Complaint  Pain and Follow-up of the Right Hand    Subjective          Ronnie Baron presents to [facility] for a follow up for his right hand.     History of Present Illness    The patient presents here today for a follow up for his right hand. He has a right fifth metacarpal fracture we are treating nonoperatively. He reports he is doing well overall. He does not of any pain at this time. He presents today without a brace on and denies any new injury or falls since his last visit.     No Known Allergies     Social History     Socioeconomic History    Marital status:      Spouse name: Arielle    Years of education: High school   Tobacco Use    Smoking status: Former     Current packs/day: 0.00     Average packs/day: 1 pack/day for 30.0 years (30.0 ttl pk-yrs)     Types: Cigarettes     Start date: 1972     Quit date: 1992     Years since quittin.1     Passive exposure: Past    Smokeless tobacco: Former     Quit date: 1990   Vaping Use    Vaping status: Never Used   Substance and Sexual Activity    Alcohol use: Yes     Comment: rare occasional just pleasure  none since 2016    Drug use: No    Sexual activity: Not Currently     Partners: Female     Birth control/protection: Other        I reviewed the patient's chief complaint, history of present illness, review of systems, past medical history, surgical history, family history, social history, medications, and allergy list.     REVIEW OF SYSTEMS    Constitutional: Denies fevers, chills, weight loss  Cardiovascular: Denies chest pain, shortness of breath  Skin: Denies rashes, acute skin changes  Neurologic: Denies headache, loss of consciousness  MSK: right hand pain       Objective   Vital Signs:   /96   Pulse 97   Ht 175.3 cm (69\")   Wt 113 kg (249 lb)   SpO2 91%   BMI 36.77 kg/m²     Body mass index is 36.77 kg/m².    Physical Exam    General: Alert. No acute distress.   Right upper " extremity: non tender, no swelling or bruising, full finger range of motion, palpable callus formation over the fracture site, no deformity, neurovascularly intact, positive  pulses, sensation intact to the medial, radial and ulnar nerve     Procedures    Imaging Results (Most Recent)       Procedure Component Value Units Date/Time    XR Hand 2 View Right [[id number]] Resulted: 02/03/25 0825     Updated: 02/03/25 0825    Narrative:      Indications: Follow-up right fifth metacarpal fracture    Views: AP and lateral right hand    Findings: Right fifth metacarpal fracture again seen.  Shortening and   angulation are overall unchanged.  There is increasing callus formation at   the fracture site.  All joints appear reduced.      Comparative Data: Comparative data found and reviewed today                     Assessment and Plan        XR Hand 2 View Right    Result Date: 2/3/2025  Narrative: Indications: Follow-up right fifth metacarpal fracture Views: AP and lateral right hand Findings: Right fifth metacarpal fracture again seen.  Shortening and angulation are overall unchanged.  There is increasing callus formation at the fracture site.  All joints appear reduced. Comparative Data: Comparative data found and reviewed today    XR Hip With or Without Pelvis 2 - 3 View Left    Result Date: 1/22/2025  Narrative: Indications: Left hip pain Views: AP pelvis, AP and frog lateral left hip Findings: Mild to moderate left hip degenerative changes.  No fractures noted.  Degenerative changes are also seen in the lumbar spine.  Hip reduced. Comparative Data: No comparative data available    XR Hand 2 View Right    Result Date: 1/10/2025  Narrative: Indications: Follow-up right fifth metacarpal fracture Views: AP and lateral right hand Findings: Healing fifth metacarpal fracture with stable alignment.  Increasing callus formation noted. Comparative Data: Comparative data found and reviewed today      Diagnoses and all orders for  this visit:    1. Right hand pain (Primary)  -     XR Hand 2 View Right    2. Closed displaced fracture of shaft of fifth metacarpal bone of right hand, initial encounter      The patient presents here today for a follow up for his right hand. X-rays were obtained in the office today and these were reviewed today.     He will continue activities as tolerated and current medications for pain control.     Will obtain X-Rays of right hand at next visit.     Call or return if worsening symptoms.    Scribed for Shane Reyes MD by Liz Garay  02/03/2025   08:21 EST         Follow Up     4 weeks     Patient was given instructions and counseling regarding his condition or for health maintenance advice. Please see specific information pulled into the AVS if appropriate.       I have personally performed the services described in this document as scribed by the above individual and it is both accurate and complete. Shane Reyes MD 02/03/25 08:27 EST

## 2025-02-06 ENCOUNTER — HOSPITAL ENCOUNTER (OUTPATIENT)
Dept: INTERVENTIONAL RADIOLOGY/VASCULAR | Facility: HOSPITAL | Age: 65
Discharge: HOME | End: 2025-02-06
Payer: COMMERCIAL

## 2025-02-06 DIAGNOSIS — M16.12 PRIMARY OSTEOARTHRITIS OF LEFT HIP: ICD-10-CM

## 2025-02-06 DIAGNOSIS — M25.552 LEFT HIP PAIN: ICD-10-CM

## 2025-02-06 DIAGNOSIS — M70.62 TROCHANTERIC BURSITIS OF LEFT HIP: ICD-10-CM

## 2025-02-06 PROCEDURE — 25010000002 METHYLPREDNISOLONE PER 80 MG: Performed by: STUDENT IN AN ORGANIZED HEALTH CARE EDUCATION/TRAINING PROGRAM

## 2025-02-06 PROCEDURE — 25510000001 IOPAMIDOL 61 % SOLUTION: Performed by: STUDENT IN AN ORGANIZED HEALTH CARE EDUCATION/TRAINING PROGRAM

## 2025-02-06 PROCEDURE — 25010000002 LIDOCAINE 2% SOLUTION: Performed by: STUDENT IN AN ORGANIZED HEALTH CARE EDUCATION/TRAINING PROGRAM

## 2025-02-06 PROCEDURE — 77002 NEEDLE LOCALIZATION BY XRAY: CPT

## 2025-02-06 PROCEDURE — 25010000002 BUPIVACAINE (PF) 0.5 % SOLUTION: Performed by: STUDENT IN AN ORGANIZED HEALTH CARE EDUCATION/TRAINING PROGRAM

## 2025-02-06 RX ORDER — LIDOCAINE HYDROCHLORIDE 20 MG/ML
20 INJECTION, SOLUTION INFILTRATION; PERINEURAL ONCE
Status: COMPLETED | OUTPATIENT
Start: 2025-02-06 | End: 2025-02-06

## 2025-02-06 RX ORDER — METHYLPREDNISOLONE ACETATE 80 MG/ML
80 INJECTION, SUSPENSION INTRA-ARTICULAR; INTRALESIONAL; INTRAMUSCULAR; SOFT TISSUE ONCE
Status: COMPLETED | OUTPATIENT
Start: 2025-02-06 | End: 2025-02-06

## 2025-02-06 RX ORDER — IOPAMIDOL 612 MG/ML
15 INJECTION, SOLUTION INTRATHECAL
Status: COMPLETED | OUTPATIENT
Start: 2025-02-06 | End: 2025-02-06

## 2025-02-06 RX ORDER — BUPIVACAINE HYDROCHLORIDE 5 MG/ML
5 INJECTION, SOLUTION EPIDURAL; INTRACAUDAL ONCE
Status: COMPLETED | OUTPATIENT
Start: 2025-02-06 | End: 2025-02-06

## 2025-02-06 RX ADMIN — LIDOCAINE HYDROCHLORIDE 9 ML: 20 INJECTION, SOLUTION INFILTRATION; PERINEURAL at 08:50

## 2025-02-06 RX ADMIN — IOPAMIDOL 1 ML: 612 INJECTION, SOLUTION INTRATHECAL at 08:50

## 2025-02-06 RX ADMIN — BUPIVACAINE HYDROCHLORIDE 4 ML: 5 INJECTION, SOLUTION EPIDURAL; INTRACAUDAL; PERINEURAL at 08:50

## 2025-02-06 RX ADMIN — SODIUM BICARBONATE 1 ML: 84 INJECTION, SOLUTION INTRAVENOUS at 08:50

## 2025-02-06 RX ADMIN — METHYLPREDNISOLONE ACETATE 80 MG: 80 INJECTION, SUSPENSION INTRA-ARTICULAR; INTRALESIONAL; INTRAMUSCULAR; SOFT TISSUE at 08:50

## 2025-02-17 NOTE — PROGRESS NOTES
"    UROLOGY OFFICE FOLLOW UP NOTE    Subjective   HPI  Ronnie Baron is a 64 y.o. male.  Presents for follow-up BPH with LUTS, elevated PSA, also history of ED and Peyronie's.  Prior patient of Dr. Kennedy.  On Flomax and Cialis daily.    Had plan for management of his BPH with lower urinary tract symptoms the aqua ablation.  However, procedure not performed secondary to IV fluid shortage.  History of Present Illness  Accompanied by his wife.    He reports persistent urinary difficulties, characterized by a weak stream and intermittent flow. He has been unable to obtain daily Cialis due to insurance coverage issues but continues to take tamsulosin at night, with one refill remaining.     He expresses concern about the potential need for catheterization if his condition worsens. He recalls a previous cystoscopy that revealed significant prostate enlargement.     He is open to the possibility of undergoing aquablation therapy but ultimately today, decided to continue observation, current management.          ___________  TRUS prostate measurement 7/30/2024: 32.7 cc    IPSS 6/26/2024: 18 (moderate)     PSA  12/12/2024 4.21  2/7/2024: 4.3  10/28/2022: 3.58  9/7/2022: 4.52  1/16/2019: 2.01     Office cystoscopy (Dr. Kennedy) 6/7/2024:  Prostate gland is large and obstructive.  Patient has large lateral lobes and no significant median lobe.  Bladder is trabeculated, no tumors     Uroflow 6/7/2024: Average flow 7.2 mL/s     MRI prostate 3/23/2024: 35.6 cc gland; no PI-RADS lesions    Results for orders placed or performed in visit on 02/18/25   Bladder Scan    Collection Time: 02/18/25  9:40 AM   Result Value Ref Range    Urine Volume 0          Review of systems  A review of systems was performed, and positive findings are noted in the HPI.    Objective     Vital Signs:   Resp 16   Ht 175.3 cm (69\")   Wt 113 kg (249 lb)   BMI 36.77 kg/m²       Physical exam  No acute distress, well-nourished  Awake alert and " oriented  Mood normal; affect normal  Physical Exam        Bladder Scan interpretation 02/18/2025    Estimation of residual urine via [employer] 3000 Certify Bladder Scan  Performed by: NIOK Arzola  Residual Urine: 0 ml  Indication: BPH with urinary obstruction    Elevated prostate specific antigen (PSA)    Erectile dysfunction due to arterial insufficiency   Position: Supine  Examination: Incremental scanning of the suprapubic area using 2.0 MHz transducer using copious amounts of acoustic gel.   Findings: An anechoic area was demonstrated which represented the bladder, with measurement of residual urine as noted. I inspected this myself. In that the residual urine was stable or insignificant, refer to plan for treatment and plan necessary at this time.     Problem List:  Patient Active Problem List   Diagnosis    Anxiety    OCD (obsessive compulsive disorder)    Severe recurrent major depression with psychotic features    CKD (chronic kidney disease) stage 3, GFR 30-59 ml/min    Hyperglycemia    Hyperlipidemia    Vitamin D deficiency    History of deep vein thrombosis (DVT) of lower extremity    Erythrocytosis    Hypothyroidism, acquired    Obstructive sleep apnea treated with auto CPAP    Sleep-related hypoxia    Hypersomnia due to medical condition    Class 2 severe obesity due to excess calories with serious comorbidity and body mass index (BMI) of 39.0 to 39.9 in adult    Impaired fasting glucose    LFT elevation    Acute kidney failure    Long term (current) use of non-steroidal anti-inflammatories (nsaid)    Personal history of other venous thrombosis and embolism    Dyslipidemia    Prediabetes    Elevated prostate specific antigen (PSA)    Enlarged prostate on rectal examination    Bladder outlet obstruction    History of elevated prostate specific antigen (PSA)    History of prostatitis    BPH with urinary obstruction    Chronic prostatitis       Assessment & Plan   Diagnoses and all orders for this visit:    1. BPH  with urinary obstruction (Primary)  -     Bladder Scan  -     tadalafil (CIALIS) 5 MG tablet; Take 1 tablet by mouth Daily As Needed for Erectile Dysfunction for up to 180 days.  Dispense: 90 tablet; Refill: 1  -     tamsulosin (FLOMAX) 0.4 MG capsule 24 hr capsule; Take 1 capsule by mouth Every Evening for 180 days. 30 minutes after meal  Dispense: 90 capsule; Refill: 1    2. Elevated prostate specific antigen (PSA)  -     tamsulosin (FLOMAX) 0.4 MG capsule 24 hr capsule; Take 1 capsule by mouth Every Evening for 180 days. 30 minutes after meal  Dispense: 90 capsule; Refill: 1  -     PSA DIAGNOSTIC; Future    3. Erectile dysfunction due to arterial insufficiency  -     tadalafil (CIALIS) 5 MG tablet; Take 1 tablet by mouth Daily As Needed for Erectile Dysfunction for up to 180 days.  Dispense: 90 tablet; Refill: 1      Emptying bladder with out postvoid residual; continue to monitor  Assessment & Plan    His urinary symptoms, including weak stream and intermittency, are likely due to an enlarged prostate and bladder changes.     His PSA levels remain stable, recommend continued option.  Aware of possibility of further workup depending on trend.  Recheck 6 months.      The potential benefits and risks of aquablation were again discussed.    He will continue taking tamsulosin (Flomax) for symptom control, with a prescription refill provided.     Trial of combination medical management with low-dose daily Cialis.  A prescription for low-dose daily Cialis was also given, which he can obtain through [facility].  Side effects discussed.    Follow-up  The patient will follow up in 6 months, PVR, PSA prior.       All questions addressed      Patient or patient representative verbalized consent for the use of Ambient Listening during the visit with  Antionette Boss MD for chart documentation. 2/19/2025  13:23 EST

## 2025-02-18 ENCOUNTER — OFFICE VISIT (OUTPATIENT)
Dept: UROLOGY | Age: 65
End: 2025-02-18
Payer: COMMERCIAL

## 2025-02-18 VITALS — RESPIRATION RATE: 16 BRPM | BODY MASS INDEX: 36.88 KG/M2 | WEIGHT: 249 LBS | HEIGHT: 69 IN

## 2025-02-18 DIAGNOSIS — N40.1 BPH WITH URINARY OBSTRUCTION: Primary | ICD-10-CM

## 2025-02-18 DIAGNOSIS — N13.8 BPH WITH URINARY OBSTRUCTION: Primary | ICD-10-CM

## 2025-02-18 DIAGNOSIS — R97.20 ELEVATED PROSTATE SPECIFIC ANTIGEN (PSA): ICD-10-CM

## 2025-02-18 DIAGNOSIS — N52.01 ERECTILE DYSFUNCTION DUE TO ARTERIAL INSUFFICIENCY: ICD-10-CM

## 2025-02-18 LAB — URINE VOLUME: 0

## 2025-02-18 RX ORDER — TAMSULOSIN HYDROCHLORIDE 0.4 MG/1
1 CAPSULE ORAL EVERY EVENING
Qty: 90 CAPSULE | Refills: 1 | Status: SHIPPED | OUTPATIENT
Start: 2025-02-18 | End: 2025-08-17

## 2025-02-18 RX ORDER — TADALAFIL 5 MG/1
5 TABLET ORAL DAILY PRN
Qty: 90 TABLET | Refills: 1 | Status: SHIPPED | OUTPATIENT
Start: 2025-02-18 | End: 2025-08-17

## 2025-02-22 ENCOUNTER — APPOINTMENT (OUTPATIENT)
Dept: GENERAL RADIOLOGY | Facility: HOSPITAL | Age: 65
DRG: 494 | End: 2025-02-22
Payer: COMMERCIAL

## 2025-02-22 ENCOUNTER — HOSPITAL ENCOUNTER (INPATIENT)
Facility: HOSPITAL | Age: 65
LOS: 2 days | Discharge: HOME | DRG: 494 | End: 2025-02-24
Attending: EMERGENCY MEDICINE | Admitting: FAMILY MEDICINE
Payer: COMMERCIAL

## 2025-02-22 ENCOUNTER — APPOINTMENT (OUTPATIENT)
Dept: CT IMAGING | Facility: HOSPITAL | Age: 65
DRG: 494 | End: 2025-02-22
Payer: COMMERCIAL

## 2025-02-22 DIAGNOSIS — S82.832A CLOSED FRACTURE OF DISTAL END OF LEFT FIBULA, UNSPECIFIED FRACTURE MORPHOLOGY, INITIAL ENCOUNTER: ICD-10-CM

## 2025-02-22 DIAGNOSIS — S82.302A CLOSED FRACTURE OF DISTAL END OF LEFT TIBIA, UNSPECIFIED FRACTURE MORPHOLOGY, INITIAL ENCOUNTER: Primary | ICD-10-CM

## 2025-02-22 DIAGNOSIS — R26.2 DIFFICULTY WALKING: ICD-10-CM

## 2025-02-22 PROBLEM — S82.899A ANKLE FRACTURE: Status: ACTIVE | Noted: 2025-02-22

## 2025-02-22 LAB
ALBUMIN SERPL-MCNC: 3.9 G/DL (ref 3.5–5.2)
ALBUMIN/GLOB SERPL: 1.4 G/DL
ALP SERPL-CCNC: 145 U/L (ref 39–117)
ALT SERPL W P-5'-P-CCNC: 23 U/L (ref 1–41)
ANION GAP SERPL CALCULATED.3IONS-SCNC: 8.2 MMOL/L (ref 5–15)
AST SERPL-CCNC: 16 U/L (ref 1–40)
BASOPHILS # BLD AUTO: 0.07 10*3/MM3 (ref 0–0.2)
BASOPHILS NFR BLD AUTO: 0.7 % (ref 0–1.5)
BILIRUB SERPL-MCNC: 0.3 MG/DL (ref 0–1.2)
BUN SERPL-MCNC: 16 MG/DL (ref 8–23)
BUN/CREAT SERPL: 9.9 (ref 7–25)
CALCIUM SPEC-SCNC: 9 MG/DL (ref 8.6–10.5)
CHLORIDE SERPL-SCNC: 102 MMOL/L (ref 98–107)
CO2 SERPL-SCNC: 28.8 MMOL/L (ref 22–29)
CREAT SERPL-MCNC: 1.61 MG/DL (ref 0.76–1.27)
DEPRECATED RDW RBC AUTO: 44.8 FL (ref 37–54)
EGFRCR SERPLBLD CKD-EPI 2021: 47.8 ML/MIN/1.73
EOSINOPHIL # BLD AUTO: 0.09 10*3/MM3 (ref 0–0.4)
EOSINOPHIL NFR BLD AUTO: 0.9 % (ref 0.3–6.2)
ERYTHROCYTE [DISTWIDTH] IN BLOOD BY AUTOMATED COUNT: 13.3 % (ref 12.3–15.4)
GLOBULIN UR ELPH-MCNC: 2.8 GM/DL
GLUCOSE SERPL-MCNC: 104 MG/DL (ref 65–99)
HCT VFR BLD AUTO: 45.8 % (ref 37.5–51)
HGB BLD-MCNC: 15.3 G/DL (ref 13–17.7)
HOLD SPECIMEN: NORMAL
IMM GRANULOCYTES # BLD AUTO: 0.05 10*3/MM3 (ref 0–0.05)
IMM GRANULOCYTES NFR BLD AUTO: 0.5 % (ref 0–0.5)
LYMPHOCYTES # BLD AUTO: 2.11 10*3/MM3 (ref 0.7–3.1)
LYMPHOCYTES NFR BLD AUTO: 20.8 % (ref 19.6–45.3)
MCH RBC QN AUTO: 30.5 PG (ref 26.6–33)
MCHC RBC AUTO-ENTMCNC: 33.4 G/DL (ref 31.5–35.7)
MCV RBC AUTO: 91.2 FL (ref 79–97)
MONOCYTES # BLD AUTO: 0.75 10*3/MM3 (ref 0.1–0.9)
MONOCYTES NFR BLD AUTO: 7.4 % (ref 5–12)
NEUTROPHILS NFR BLD AUTO: 69.7 % (ref 42.7–76)
NEUTROPHILS NFR BLD AUTO: 7.09 10*3/MM3 (ref 1.7–7)
NRBC BLD AUTO-RTO: 0 /100 WBC (ref 0–0.2)
PLATELET # BLD AUTO: 205 10*3/MM3 (ref 140–450)
PMV BLD AUTO: 10 FL (ref 6–12)
POTASSIUM SERPL-SCNC: 4.2 MMOL/L (ref 3.5–5.2)
PROT SERPL-MCNC: 6.7 G/DL (ref 6–8.5)
QT INTERVAL: 352 MS
QTC INTERVAL: 467 MS
RBC # BLD AUTO: 5.02 10*6/MM3 (ref 4.14–5.8)
SODIUM SERPL-SCNC: 139 MMOL/L (ref 136–145)
WBC NRBC COR # BLD AUTO: 10.16 10*3/MM3 (ref 3.4–10.8)
WHOLE BLOOD HOLD COAG: NORMAL

## 2025-02-22 PROCEDURE — 73590 X-RAY EXAM OF LOWER LEG: CPT

## 2025-02-22 PROCEDURE — 73700 CT LOWER EXTREMITY W/O DYE: CPT

## 2025-02-22 PROCEDURE — 25010000002 HYDROMORPHONE 1 MG/ML SOLUTION: Performed by: EMERGENCY MEDICINE

## 2025-02-22 PROCEDURE — 99222 1ST HOSP IP/OBS MODERATE 55: CPT | Performed by: FAMILY MEDICINE

## 2025-02-22 PROCEDURE — 0QSKXZZ REPOSITION LEFT FIBULA, EXTERNAL APPROACH: ICD-10-PCS | Performed by: EMERGENCY MEDICINE

## 2025-02-22 PROCEDURE — 2W3RX1Z IMMOBILIZATION OF LEFT LOWER LEG USING SPLINT: ICD-10-PCS | Performed by: EMERGENCY MEDICINE

## 2025-02-22 PROCEDURE — 25810000003 SODIUM CHLORIDE 0.9 % SOLUTION: Performed by: EMERGENCY MEDICINE

## 2025-02-22 PROCEDURE — 85025 COMPLETE CBC W/AUTO DIFF WBC: CPT | Performed by: EMERGENCY MEDICINE

## 2025-02-22 PROCEDURE — 2W6RXZZ TRACTION OF LEFT LOWER LEG: ICD-10-PCS | Performed by: EMERGENCY MEDICINE

## 2025-02-22 PROCEDURE — 25010000002 PROPOFOL 10 MG/ML EMULSION: Performed by: EMERGENCY MEDICINE

## 2025-02-22 PROCEDURE — 73600 X-RAY EXAM OF ANKLE: CPT

## 2025-02-22 PROCEDURE — 25010000002 ONDANSETRON PER 1 MG: Performed by: EMERGENCY MEDICINE

## 2025-02-22 PROCEDURE — 94799 UNLISTED PULMONARY SVC/PX: CPT

## 2025-02-22 PROCEDURE — 93005 ELECTROCARDIOGRAM TRACING: CPT | Performed by: EMERGENCY MEDICINE

## 2025-02-22 PROCEDURE — 71045 X-RAY EXAM CHEST 1 VIEW: CPT

## 2025-02-22 PROCEDURE — 80053 COMPREHEN METABOLIC PANEL: CPT | Performed by: EMERGENCY MEDICINE

## 2025-02-22 PROCEDURE — 0QSHXZZ REPOSITION LEFT TIBIA, EXTERNAL APPROACH: ICD-10-PCS | Performed by: EMERGENCY MEDICINE

## 2025-02-22 PROCEDURE — 99285 EMERGENCY DEPT VISIT HI MDM: CPT

## 2025-02-22 RX ORDER — ASCORBIC ACID 500 MG
500 TABLET ORAL DAILY
COMMUNITY

## 2025-02-22 RX ORDER — PROPOFOL 10 MG/ML
200 VIAL (ML) INTRAVENOUS ONCE
Status: DISCONTINUED | OUTPATIENT
Start: 2025-02-22 | End: 2025-02-24 | Stop reason: HOSPADM

## 2025-02-22 RX ORDER — KETOROLAC TROMETHAMINE 15 MG/ML
15 INJECTION, SOLUTION INTRAMUSCULAR; INTRAVENOUS EVERY 6 HOURS PRN
Status: DISCONTINUED | OUTPATIENT
Start: 2025-02-22 | End: 2025-02-24 | Stop reason: HOSPADM

## 2025-02-22 RX ORDER — IOPAMIDOL 755 MG/ML
100 INJECTION, SOLUTION INTRAVASCULAR
Status: DISCONTINUED | OUTPATIENT
Start: 2025-02-22 | End: 2025-02-24 | Stop reason: HOSPADM

## 2025-02-22 RX ORDER — ONDANSETRON 2 MG/ML
4 INJECTION INTRAMUSCULAR; INTRAVENOUS ONCE
Status: COMPLETED | OUTPATIENT
Start: 2025-02-22 | End: 2025-02-22

## 2025-02-22 RX ORDER — ACETAMINOPHEN 325 MG/1
650 TABLET ORAL EVERY 6 HOURS PRN
Status: DISCONTINUED | OUTPATIENT
Start: 2025-02-22 | End: 2025-02-24 | Stop reason: HOSPADM

## 2025-02-22 RX ORDER — PROPOFOL 10 MG/ML
VIAL (ML) INTRAVENOUS
Status: COMPLETED | OUTPATIENT
Start: 2025-02-22 | End: 2025-02-22

## 2025-02-22 RX ORDER — GINSENG 100 MG
50 CAPSULE ORAL DAILY
COMMUNITY

## 2025-02-22 RX ADMIN — ONDANSETRON 4 MG: 2 INJECTION INTRAMUSCULAR; INTRAVENOUS at 20:11

## 2025-02-22 RX ADMIN — SODIUM CHLORIDE 500 ML: 9 INJECTION, SOLUTION INTRAVENOUS at 20:09

## 2025-02-22 RX ADMIN — PROPOFOL 40 MG: 10 INJECTION, EMULSION INTRAVENOUS at 20:57

## 2025-02-22 RX ADMIN — HYDROMORPHONE HYDROCHLORIDE 1 MG: 1 INJECTION, SOLUTION INTRAMUSCULAR; INTRAVENOUS; SUBCUTANEOUS at 20:14

## 2025-02-22 RX ADMIN — PROPOFOL 70 MG: 10 INJECTION, EMULSION INTRAVENOUS at 21:00

## 2025-02-22 NOTE — ED PROVIDER NOTES
Time: 3:46 PM EST  Date of encounter:  2/22/2025  Independent Historian/Clinical History and Information was obtained by:   Patient    History is limited by: N/A    Chief Complaint   Patient presents with    Ankle Injury         History of Present Illness:  Patient is a 64 y.o. year old male who presents to the emergency department for evaluation of left ankle pain secondary to mechanical fall taking out the trash when he slipped on the ice.  Patient states he felt a pop in his left ankle.  The patient denies any other pain or injury however has been unable to ambulate.  He has deformity to the left lower extremity.  Patient denies numbness/tingling in toes. Patient was seen by provider in triage, Jay Coles PA-C      Patient Care Team  Primary Care Provider: Marj Nolasco APRN    Past Medical History:     No Known Allergies  Past Medical History:   Diagnosis Date    Alteration in appetite     Anxiety     Chronic prostatitis 4/2/2024    CKD (chronic kidney disease) stage 3, GFR 30-59 ml/min     Depression     severe recurrent major depression with psychotic features    DVT (deep venous thrombosis)     Elevated PSA 09/22/22    Enlarged prostate on rectal examination 09/26/2022    Hyperglycemia     Hyperlipidemia     OCD (obsessive compulsive disorder)     TALON on AUTOCPAP 02/26/2019    Moderate severity TALON with AHI 20 events per hour.  Sleep related hypoxia present.    Renal insufficiency     Tachycardia     Vitamin D deficiency      Past Surgical History:   Procedure Laterality Date    CYSTOSCOPY      ELBOW PROCEDURE  06/2014     Family History   Problem Relation Age of Onset    Hypertension Mother     Stroke Mother         November 2016 with two blood clots    Depression Mother         takes welbutrin    Kidney disease Mother     Hypertension Father     Depression Father         takes medicine care give of wife    Heart disease Maternal Grandfather     Cancer Maternal Grandmother        Home  Medications:  Prior to Admission medications    Medication Sig Start Date End Date Taking? Authorizing Provider   aspirin 81 MG EC tablet Take 1 tablet by mouth Daily.    Errol Ziegler MD   carbamide peroxide (Debrox) 6.5 % otic solution Administer 5 drops into both ears 2 (Two) Times a Day. 12/30/24   Belle Philippe APRN   clomiPRAMINE (ANAFRANIL) 50 MG capsule  12/22/20   Errol Ziegler MD   Farxiga 10 MG tablet Take 10 mg by mouth Daily. 9/9/23   Errol Ziegler MD   fluticasone (FLONASE) 50 MCG/ACT nasal spray Administer 2 sprays into the nostril(s) as directed by provider Daily. 12/30/24   Belle Philippe APRN   fluvoxaMINE (LUVOX) 100 MG tablet  3/7/17   Errol Ziegler MD   gabapentin (NEURONTIN) 600 MG tablet take 1 tablet by mouth TWICE a day 6/28/16   Errol Ziegler MD   levothyroxine (Synthroid) 50 MCG tablet Take 1 tablet by mouth Every Morning. 1/15/25   Marj Nolasco APRN   LORazepam (ATIVAN) 0.5 MG tablet  4/27/23   Errol Ziegler MD   Multiple Vitamins-Minerals (MENS MULTI VITAMIN & MINERAL PO) Take  by mouth.    Errol Ziegler MD   OLANZapine (zyPREXA) 5 MG tablet  8/25/22   Errol Ziegler MD   rosuvastatin (CRESTOR) 10 MG tablet Take 1 tablet by mouth Daily. for cholesterol 1/15/25   Marj Nolasco APRN   tadalafil (CIALIS) 5 MG tablet Take 1 tablet by mouth Daily As Needed for Erectile Dysfunction for up to 180 days. 2/18/25 8/17/25  Antionette Boss MD   tamsulosin (FLOMAX) 0.4 MG capsule 24 hr capsule Take 1 capsule by mouth Every Evening for 180 days. 30 minutes after meal 2/18/25 8/17/25  Antionette Boss MD   traZODone (DESYREL) 50 MG tablet  3/14/17   Errol Ziegler MD   Vitamin D, Cholecalciferol, 1000 UNITS tablet Take 2 tablets by mouth Daily.    Errol Ziegler MD        Social History:   Social History     Tobacco Use    Smoking status: Former     Current packs/day: 0.00     Average packs/day: 1  "pack/day for 30.0 years (30.0 ttl pk-yrs)     Types: Cigarettes     Start date: 1972     Quit date: 1992     Years since quittin.1     Passive exposure: Past    Smokeless tobacco: Former     Quit date: 1990   Vaping Use    Vaping status: Never Used   Substance Use Topics    Alcohol use: Yes     Comment: rare occasional just pleasure  none since 2016    Drug use: No         Review of Systems:  Review of Systems   Constitutional:  Negative for chills and fever.   HENT:  Negative for congestion, ear pain and sore throat.    Eyes:  Negative for pain.   Respiratory:  Negative for cough, chest tightness and shortness of breath.    Cardiovascular:  Negative for chest pain.   Gastrointestinal:  Negative for abdominal pain, diarrhea, nausea and vomiting.   Genitourinary:  Negative for flank pain and hematuria.   Musculoskeletal:  Positive for arthralgias, gait problem and myalgias. Negative for joint swelling.   Skin:  Negative for pallor.   Neurological:  Negative for seizures and headaches.   All other systems reviewed and are negative.       Physical Exam:  /83 (Patient Position: Lying)   Pulse 102   Temp 97.8 °F (36.6 °C) (Oral)   Resp 14   Ht 175.3 cm (69\")   Wt 113 kg (250 lb)   SpO2 97%   BMI 36.92 kg/m²         Physical Exam  Vitals and nursing note reviewed.   Constitutional:       General: He is not in acute distress.     Appearance: Normal appearance. He is not toxic-appearing.   HENT:      Head: Normocephalic and atraumatic.      Mouth/Throat:      Mouth: Mucous membranes are moist.   Eyes:      General: No scleral icterus.     Extraocular Movements: Extraocular movements intact.      Conjunctiva/sclera: Conjunctivae normal.   Cardiovascular:      Rate and Rhythm: Normal rate and regular rhythm.      Pulses: Normal pulses.      Heart sounds: Normal heart sounds.   Pulmonary:      Effort: Pulmonary effort is normal. No respiratory distress.      Breath sounds: Normal breath " sounds.   Abdominal:      General: Abdomen is flat. There is no distension.      Palpations: Abdomen is soft.      Tenderness: There is no abdominal tenderness.   Musculoskeletal:         General: Swelling, tenderness (Mild appreciated upon palpation to left lateral malleolus) and deformity present. Normal range of motion.      Cervical back: Normal range of motion and neck supple.      Left lower leg: Edema present.      Comments: Obvious deformity to the left leg and ankle with normal distal neurovascular function   Skin:     General: Skin is warm and dry.      Capillary Refill: Capillary refill takes less than 2 seconds.      Coloration: Skin is not cyanotic.   Neurological:      General: No focal deficit present.      Mental Status: He is alert and oriented to person, place, and time. Mental status is at baseline.   Psychiatric:         Attention and Perception: Attention and perception normal.         Mood and Affect: Mood normal.                            Medical Decision Making:      Comorbidities that affect care:    Chronic Kidney Disease    External Notes reviewed:    Previous Clinic Note: Urology office visit.      The following orders were placed and all results were independently analyzed by me:  Orders Placed This Encounter   Procedures    FX Dislocation Initial    Procedural Sedation    XR Ankle 2 View Left    XR Tibia Fibula 2 View Left    XR Chest 1 View    CT Lower Extremity Left Without Contrast    Comprehensive Metabolic Panel    CBC Auto Differential    Comprehensive Metabolic Panel    CBC Auto Differential    Diet: Regular/House; Fluid Consistency: Thin (IDDSI 0)    NPO Diet NPO Type: Strict NPO    Obtain Informed Consent    Obtain Informed Consent    Code Status and Medical Interventions: CPR (Attempt to Resuscitate); Full Support    Hospitalist (on-call MD unless specified)    ECG 12 Lead Rhythm Change    Inpatient Admission    CBC & Differential    Extra Tubes    Gold Top - SST    Light  Blue Top    CBC & Differential       Medications Given in the Emergency Department:  Medications   propofol (DIPRIVAN) injection 200 mg (has no administration in time range)   iopamidol (ISOVUE-370) 76 % injection 100 mL (has no administration in time range)   HYDROmorphone (DILAUDID) injection 0.5 mg (has no administration in time range)   ketorolac (TORADOL) injection 15 mg (has no administration in time range)   acetaminophen (TYLENOL) tablet 650 mg (has no administration in time range)   sodium chloride 0.9 % bolus 500 mL (500 mL Intravenous New Bag 2/22/25 2009)   HYDROmorphone (DILAUDID) injection 1 mg (1 mg Intravenous Given 2/22/25 2014)   ondansetron (ZOFRAN) injection 4 mg (4 mg Intravenous Given 2/22/25 2011)   propofol (DIPRIVAN) infusion 10 mg/mL 100 mL (40 mg Intravenous New Bag 2/22/25 2057)   propofol (DIPRIVAN) injection (70 mg Intravenous Given 2/22/25 2100)        ED Course:    The patient was initially evaluated in the triage area where orders were placed. The patient was later dispositioned by Wilfred Guerrero DO.      The patient was advised to stay for completion of workup which includes but is not limited to communication of labs and radiological results, reassessment and plan. The patient was advised that leaving prior to disposition by a provider could result in critical findings that are not communicated to the patient.     ED Course as of 02/22/25 2258   Sat Feb 22, 2025   1546 Provider In Triage: Patient was evaluated by me in triage, Jay Coles PA-C. Orders were placed and the patient is currently awaiting final results and disposition.   [SK]      ED Course User Index  [SK] Jay Coles PA-C       EKG: Sinus tachycardia 3 to 106 bpm  No acute ischemia.        Labs:    Lab Results (last 24 hours)       Procedure Component Value Units Date/Time    CBC & Differential [[id number]]  (Abnormal) Collected: 02/22/25 1928    Specimen: Blood Updated: 02/22/25 1935    Narrative:      The  following orders were created for panel order CBC & Differential.  Procedure                               Abnormality         Status                     ---------                               -----------         ------                     CBC Auto Differential[[id number]]        Abnormal            Final result                 Please view results for these tests on the individual orders.    Comprehensive Metabolic Panel [[id number]]  (Abnormal) Collected: 02/22/25 1928    Specimen: Blood Updated: 02/22/25 1951     Glucose 104 mg/dL      BUN 16 mg/dL      Creatinine 1.61 mg/dL      Sodium 139 mmol/L      Potassium 4.2 mmol/L      Chloride 102 mmol/L      CO2 28.8 mmol/L      Calcium 9.0 mg/dL      Total Protein 6.7 g/dL      Albumin 3.9 g/dL      ALT (SGPT) 23 U/L      AST (SGOT) 16 U/L      Alkaline Phosphatase 145 U/L      Total Bilirubin 0.3 mg/dL      Globulin 2.8 gm/dL      A/G Ratio 1.4 g/dL      BUN/Creatinine Ratio 9.9     Anion Gap 8.2 mmol/L      eGFR 47.8 mL/min/1.73     Narrative:      GFR Categories in Chronic Kidney Disease (CKD)      GFR Category          GFR (mL/min/1.73)    Interpretation  G1                     90 or greater         Normal or high (1)  G2                      60-89                Mild decrease (1)  G3a                   45-59                Mild to moderate decrease  G3b                   30-44                Moderate to severe decrease  G4                    15-29                Severe decrease  G5                    14 or less           Kidney failure          (1)In the absence of evidence of kidney disease, neither GFR category G1 or G2 fulfill the criteria for CKD.    eGFR calculation 2021 CKD-EPI creatinine equation, which does not include race as a factor    CBC Auto Differential [[id number]]  (Abnormal) Collected: 02/22/25 1928    Specimen: Blood Updated: 02/22/25 1935     WBC 10.16 10*3/mm3      RBC 5.02 10*6/mm3      Hemoglobin 15.3 g/dL      Hematocrit 45.8 %      MCV 91.2  fL      MCH 30.5 pg      MCHC 33.4 g/dL      RDW 13.3 %      RDW-SD 44.8 fl      MPV 10.0 fL      Platelets 205 10*3/mm3      Neutrophil % 69.7 %      Lymphocyte % 20.8 %      Monocyte % 7.4 %      Eosinophil % 0.9 %      Basophil % 0.7 %      Immature Grans % 0.5 %      Neutrophils, Absolute 7.09 10*3/mm3      Lymphocytes, Absolute 2.11 10*3/mm3      Monocytes, Absolute 0.75 10*3/mm3      Eosinophils, Absolute 0.09 10*3/mm3      Basophils, Absolute 0.07 10*3/mm3      Immature Grans, Absolute 0.05 10*3/mm3      nRBC 0.0 /100 WBC              Imaging:    CT Lower Extremity Left Without Contrast    Result Date: 2/22/2025  CT LOWER EXTREMITY LEFT WO CONTRAST-  (LEFT ANKLE CT WO)  Date of exam: 2/22/2025, 10:20 P.M.  Comparisons: 2/22/2025.  INDICATIONS: 64-year-old male w/ h/o fall & left ankle fracture-dislocation; CT ankle-ankle injury-planning for surgery-please include all parts of fracture; s82.302a-unspecified fracture of lower end of left tibia, initial encounter for closed fracture; s82.832a-other fracture of upper and lower end of left fibula, initial encounter for closed fracture.  TECHNIQUE: Axial CT images were obtained of the left lower extremity without contrast administration. Reconstructed 2D & 3D coronal and sagittal images were also obtained. Automated exposure control and iterative construction methods were used.  FINDINGS: Again identified is an acute fracture-dislocation of the left ankle with displaced (1.2 cm maximum distraction/displacement) fractures involving the medial and posterior malleoli of the distal left tibia as well as an oblique displaced (1.3 cm) comminuted fracture involving the distal shaft of the left fibula (which is about 13 cm proximal to or superior to the left ankle mortise). These fractures suggest a Wyatt type C injury. The distal tibial fractures are intra-articular. The distal left fibular fracture is extra-articular. Acute contusion is centered about the level of the  fractures. Acute ligamentous and tendinous injuries may be present and are not well evaluated by CT. Some type of casting material is suspected in place and may obscure detail. No subcutaneous emphysema. No retained radiopaque foreign body. Incidental enthesopathy involves the left calcaneus.       An acute trimalleolar Wyatt type C fracture-dislocation is suggested by CT, as discussed. Please see above comments for further detail.   Portions of this note were completed with a voice recognition program.  2/22/2025 10:45 PM by Beto Tobin MD on Workstation: [email or web address]      XR Chest 1 View    Result Date: 2/22/2025  XR CHEST 1 VW-  Date of exam: 2/22/2025 9:51, P.M.  Indications: Preop. assessment for ORIF of left ankle fracture-dislocation; s82.302a-unspecified fracture of lower end of left tibia, initial encounter for closed fracture; s82.832a-other fracture of upper and lower end of left fibula, initial encounter for closed fracture.  Comparison: 12/20/2018.  FINDINGS: A single AP (or PA) upright portable chest radiograph was performed. Mild-to-moderate cardiac enlargement is seen. Probably no acute infiltrate is appreciated. No pleural effusion or pneumothorax is identified. There is a left shoulder prosthesis in place, probably new since the prior study. External artifacts obscure detail. There are low lung volumes. The projection is apical-lordotic, which may limit assessment. Degenerative changes involve the spine and right shoulder. The deviation of the trachea to the right is probably related to extrinsic vascular deformity.       Probably no acute infiltrate is appreciated. There may be mild-to-moderate cardiomegaly, probably present previously.    Portions of this note were completed with a voice recognition program.  2/22/2025 10:02 PM by Beto Tobin MD on Workstation: [email or web address]      XR Tibia Fibula 2 View Left    Result Date: 2/22/2025  XR TIBIA FIBULA 2 VW LEFT-  Date of exam: 2/22/2025, 9:07 P.M.   Indication: reduction  Comparison: 2/22/2025, 4:13 p.m.  FINDINGS: Four (4) views were obtained. There has been interval closed reduction of the left ankle Wyatt type C trimalleolar fracture-dislocation with interval improvement in alignment. There is approximately 9 mm of persistent posterior displacement of the large distal left fibular fracture fragment on the postreduction views. A cast is in place obscuring detail. No definite proximal acute left tibial or fibular fractures are seen. If symptoms or clinical concerns persist, consider imaging follow-up.       There has been interval improvement in alignment after closed reduction of the acute left ankle fracture-dislocation. The alignment is grossly anatomic. Please see above comments for further detail.   Portions of this note were completed with a voice recognition program.  2/22/2025 9:19 PM by Beto Tobin MD on Workstation: [employer]      XR Ankle 2 View Left    Result Date: 2/22/2025  XR ANKLE 2 VW LEFT Date of Exam: 2/22/2025 4:12 PM EST Indication: fall/pain Comparison: None available. Findings: Moderately displaced oblique fracture of the distal fibular shaft with medial and dorsal angulation of the distal fracture fragment. There is also a moderately displaced medial malleolus fracture with foreshortening. There is posterior dislocation of the  talus relative to the tibia with a moderately displaced posterior malleolus fracture. Calcaneal enthesopathy. Soft tissue edema about the lower leg.     Impression: Trimalleolar fracture dislocation as above. Electronically Signed: Sim Cleveland MD  2/22/2025 4:34 PM EST  Workstation ID: [id number]       Differential Diagnosis and Discussion:      Trauma:  Differential diagnosis considered but not limited to were subarachnoid hemorrhage, intracranial bleeding, pneumothorax, cardiac contusion, lung contusion, intra-abdominal bleeding, and compartment syndrome of any extremity or other significant traumatic  pathology    PROCEDURES:    Labs were collected in the emergency department and all labs were reviewed and interpreted by me.  X-ray were performed in the emergency department and all X-ray impressions were independently interpreted by me.  An EKG was performed and the EKG was interpreted by me.    ECG 12 Lead Rhythm Change   Preliminary Result   HEART HMAI=687  bpm   RR Glhupzfj=864  ms   NE Uyidckda=523  ms   P Horizontal Axis=-4  deg   P Front Axis=45  deg   QRSD Interval=94  ms   QT Ljsksaoo=243  ms   RDsS=960  ms   QRS Axis=132  deg   T Wave Axis=-4  deg   - ABNORMAL ECG -   Sinus tachycardia   Ventricular premature complex   Right axis deviation   Low voltage, precordial leads   Consider anterior infarct   Borderline T abnormalities, inferior leads   Date and Time of Study:2025-02-22 22:37:03           FX Dislocation    Date/Time: 2/22/2025 10:55 PM    Performed by: Wilfred Guerrero DO  Authorized by: Wilfred Guerrero DO    Consent:     Consent obtained:  Written    Consent given by:  Patient    Risks, benefits, and alternatives were discussed: yes      Risks discussed:  Nerve damage, pain and vascular damage    Alternatives discussed:  No treatment, delayed treatment and alternative treatment  Universal protocol:     Procedure explained and questions answered to patient or proxy's satisfaction: yes      Relevant documents present and verified: yes      Test results available: yes      Imaging studies available: yes      Required blood products, implants, devices, and special equipment available: yes      Patient identity confirmed:  Verbally with patient and arm band  Injury:     Injury location:  Ankle    Ankle injury location:  L ankle    Ankle fracture type: bimalleolar    Pre-procedure details:     Distal neurologic exam:  Normal    Distal perfusion: distal pulses strong      Range of motion: reduced    Sedation:     Sedation type:  Moderate sedation  Anesthesia:     Anesthesia method:   None  Procedure details:     Manipulation performed: yes      Skin traction used: no      Skeletal traction used: yes      Pin inserted: no      Reduction successful: yes      X-ray confirmed reduction: yes      Immobilization:  Splint    Splint type:  Short leg and ankle stirrup    Supplies used:  Cotton padding, fiberglass and elastic bandage    Attestation: Splint applied and adjusted personally by me    Post-procedure details:     Distal neurologic exam:  Normal    Distal perfusion: distal pulses strong      Range of motion: unchanged      Procedure completion:  Tolerated well, no immediate complications  Procedural Sedation    Date/Time: 2025 10:56 PM    Performed by: Wilfred Guerrero DO  Authorized by: Wilfred Guerrero DO    Consent:     Consent obtained:  Written    Consent given by:  Patient    Risks, benefits, and alternatives were discussed: yes      Risks discussed:  Allergic reaction, inadequate sedation, respiratory compromise necessitating ventilatory assistance and intubation, prolonged sedation necessitating reversal, nausea and vomiting    Alternatives discussed:  Analgesia without sedation and anxiolysis  Universal protocol:     Procedure explained and questions answered to patient or proxy's satisfaction: yes      Relevant documents present and verified: yes      Test results available: yes      Imaging studies available: yes      Required blood products, implants, devices, and special equipment available: yes      Patient identity confirmed:  Verbally with patient and arm band  Indications:     Procedure performed:  Fracture reduction    Procedure necessitating sedation performed by:  Physician performing sedation    Intended level of sedation:  Moderate  Pre-sedation assessment:     Time since last food or drink:  Lunch    ASA classification: class 2 - patient with mild systemic disease      Mouth openin finger widths    Thyromental distance:  3 finger widths    Mallampati score:   II - soft palate, uvula, fauces visible    Neck mobility: normal      Pre-sedation assessments completed and reviewed: airway patency, anesthesia/sedation history, cardiovascular function, hydration status, mental status, nausea/vomiting, pain level, respiratory function and temperature      History of difficult intubation: no    Immediate pre-procedure details:     Reviewed: vital signs, relevant labs/tests and NPO status      Verified: bag valve mask available, emergency equipment available, intubation equipment available, IV patency confirmed, oxygen available, reversal medications available and suction available    Procedure details (see MAR for exact dosages):     Preoxygenation:  Nasal cannula    Sedation:  Propofol    Analgesia:  Hydromorphone    Intra-procedure monitoring:  Blood pressure monitoring, cardiac monitor, continuous pulse oximetry, continuous capnometry, frequent LOC assessments and frequent vital sign checks    Intra-procedure events: none    Post-procedure details:     Estimated blood loss (see I/O flowsheets): no      Post-sedation assessments completed and reviewed: airway patency, cardiovascular function, hydration status, mental status, nausea/vomiting, pain level, respiratory function and temperature      Specimens recovered:  None    Patient is stable for discharge or admission: yes      Procedure completion:  Tolerated well, no immediate complications      MDM     Amount and/or Complexity of Data Reviewed  Clinical lab tests: reviewed  Tests in the radiology section of CPT®: reviewed                     Patient Care Considerations:    CT HEAD: I considered ordering a noncontrast CT of the head, however the patient had no head trauma or loss of consciousness.      Consultants/Shared Management Plan:    Hospitalist: I have discussed the case with hospitalist who agrees to accept the patient for admission.  Consultant: I have discussed the case with Dr. Valdovinos who states he would like  the patient have a CT of the ankle and be n.p.o. after midnight so that he can operate tomorrow    Social Determinants of Health:    Patient is unable to carry out activities of daily life. Escalation of care is necessary.       Disposition and Care Coordination:    Admit:   Through independent evaluation of the patient's history, physical, and imperical data, the patient meets criteria for inpatient admission to the hospital.        Final diagnoses:   Closed fracture of distal end of left tibia, unspecified fracture morphology, initial encounter   Closed fracture of distal end of left fibula, unspecified fracture morphology, initial encounter        ED Disposition       ED Disposition   Decision to Admit    Condition   --    Comment   Level of Care: Remote Telemetry [26]   Diagnosis: Ankle fracture [[id number]]   Admitting Physician: LISA SERRANO [[id number]]   Attending Physician: LISA SERRANO [[id number]]   Certification: I Certify That Inpatient Hospital Services Are Medically Necessary For Greater Than 2 Midnights                 This medical record created using voice recognition software.             Wilfred Guerrero,   02/22/25 0330

## 2025-02-22 NOTE — Clinical Note
Level of Care: Telemetry [5]   Diagnosis: Ankle fracture [659585]   Admitting Physician: LISA SERRANO [[id number]]   Certification: I Certify That Inpatient Hospital Services Are Medically Necessary For Greater Than 2 Midnights

## 2025-02-23 ENCOUNTER — ANESTHESIA EVENT (OUTPATIENT)
Dept: PERIOP | Facility: HOSPITAL | Age: 65
End: 2025-02-23
Payer: COMMERCIAL

## 2025-02-23 ENCOUNTER — APPOINTMENT (OUTPATIENT)
Dept: GENERAL RADIOLOGY | Facility: HOSPITAL | Age: 65
DRG: 494 | End: 2025-02-23
Payer: COMMERCIAL

## 2025-02-23 ENCOUNTER — ANESTHESIA (OUTPATIENT)
Dept: PERIOP | Facility: HOSPITAL | Age: 65
End: 2025-02-23
Payer: COMMERCIAL

## 2025-02-23 ENCOUNTER — ANESTHESIA EVENT CONVERTED (OUTPATIENT)
Dept: ANESTHESIOLOGY | Facility: HOSPITAL | Age: 65
DRG: 494 | End: 2025-02-23
Payer: COMMERCIAL

## 2025-02-23 PROBLEM — S82.302A CLOSED FRACTURE OF LEFT DISTAL TIBIA: Status: ACTIVE | Noted: 2025-02-22

## 2025-02-23 PROBLEM — S82.832A CLOSED FRACTURE OF LEFT DISTAL FIBULA: Status: ACTIVE | Noted: 2025-02-22

## 2025-02-23 LAB
ANION GAP SERPL CALCULATED.3IONS-SCNC: 7 MMOL/L (ref 5–15)
BUN SERPL-MCNC: 17 MG/DL (ref 8–23)
BUN/CREAT SERPL: 11.1 (ref 7–25)
CALCIUM SPEC-SCNC: 8.8 MG/DL (ref 8.6–10.5)
CHLORIDE SERPL-SCNC: 102 MMOL/L (ref 98–107)
CO2 SERPL-SCNC: 27 MMOL/L (ref 22–29)
CREAT SERPL-MCNC: 1.53 MG/DL (ref 0.76–1.27)
EGFRCR SERPLBLD CKD-EPI 2021: 50.8 ML/MIN/1.73
GLUCOSE SERPL-MCNC: 109 MG/DL (ref 65–99)
POTASSIUM SERPL-SCNC: 4.2 MMOL/L (ref 3.5–5.2)
SODIUM SERPL-SCNC: 136 MMOL/L (ref 136–145)

## 2025-02-23 PROCEDURE — C1713 ANCHOR/SCREW BN/BN,TIS/BN: HCPCS | Performed by: ORTHOPAEDIC SURGERY

## 2025-02-23 PROCEDURE — 25010000002 CEFAZOLIN PER 500 MG: Performed by: ORTHOPAEDIC SURGERY

## 2025-02-23 PROCEDURE — 94761 N-INVAS EAR/PLS OXIMETRY MLT: CPT

## 2025-02-23 PROCEDURE — 25010000002 ONDANSETRON PER 1 MG: Performed by: ANESTHESIOLOGY

## 2025-02-23 PROCEDURE — 25810000003 LACTATED RINGERS PER 1000 ML: Performed by: ANESTHESIOLOGY

## 2025-02-23 PROCEDURE — 25010000002 MIDAZOLAM PER 1MG

## 2025-02-23 PROCEDURE — 27822 TREATMENT OF ANKLE FRACTURE: CPT | Performed by: PHYSICIAN ASSISTANT

## 2025-02-23 PROCEDURE — 25010000002 PHENYLEPHRINE 10 MG/ML SOLUTION 5 ML VIAL: Performed by: ANESTHESIOLOGY

## 2025-02-23 PROCEDURE — 25010000002 FENTANYL CITRATE (PF) 50 MCG/ML SOLUTION: Performed by: ANESTHESIOLOGY

## 2025-02-23 PROCEDURE — 25010000002 HEPARIN (PORCINE) PER 1000 UNITS: Performed by: ORTHOPAEDIC SURGERY

## 2025-02-23 PROCEDURE — 94799 UNLISTED PULMONARY SVC/PX: CPT

## 2025-02-23 PROCEDURE — 27822 TREATMENT OF ANKLE FRACTURE: CPT | Performed by: ORTHOPAEDIC SURGERY

## 2025-02-23 PROCEDURE — 25010000002 LIDOCAINE PF 2% 2 % SOLUTION: Performed by: ANESTHESIOLOGY

## 2025-02-23 PROCEDURE — 25010000002 HYDROMORPHONE 1 MG/ML SOLUTION: Performed by: FAMILY MEDICINE

## 2025-02-23 PROCEDURE — 25810000003 SODIUM CHLORIDE 0.9 % SOLUTION 250 ML FLEX CONT: Performed by: ANESTHESIOLOGY

## 2025-02-23 PROCEDURE — 25010000002 KETOROLAC TROMETHAMINE PER 15 MG: Performed by: ANESTHESIOLOGY

## 2025-02-23 PROCEDURE — 25810000003 SODIUM CHLORIDE 0.9 % SOLUTION: Performed by: ORTHOPAEDIC SURGERY

## 2025-02-23 PROCEDURE — 0QSH04Z REPOSITION LEFT TIBIA WITH INTERNAL FIXATION DEVICE, OPEN APPROACH: ICD-10-PCS | Performed by: ORTHOPAEDIC SURGERY

## 2025-02-23 PROCEDURE — 25010000002 PROPOFOL 10 MG/ML EMULSION: Performed by: ANESTHESIOLOGY

## 2025-02-23 PROCEDURE — 25010000002 KETOROLAC TROMETHAMINE PER 15 MG: Performed by: FAMILY MEDICINE

## 2025-02-23 PROCEDURE — 25810000003 LACTATED RINGERS PER 1000 ML: Performed by: FAMILY MEDICINE

## 2025-02-23 PROCEDURE — 80048 BASIC METABOLIC PNL TOTAL CA: CPT | Performed by: FAMILY MEDICINE

## 2025-02-23 PROCEDURE — 25010000002 DEXAMETHASONE PER 1 MG: Performed by: ANESTHESIOLOGY

## 2025-02-23 PROCEDURE — 76000 FLUOROSCOPY <1 HR PHYS/QHP: CPT

## 2025-02-23 PROCEDURE — 0QSK04Z REPOSITION LEFT FIBULA WITH INTERNAL FIXATION DEVICE, OPEN APPROACH: ICD-10-PCS | Performed by: ORTHOPAEDIC SURGERY

## 2025-02-23 PROCEDURE — 99222 1ST HOSP IP/OBS MODERATE 55: CPT | Performed by: ORTHOPAEDIC SURGERY

## 2025-02-23 DEVICE — BONE SCREW, FULLY THREADED,T10
Type: IMPLANTABLE DEVICE | Site: ANKLE | Status: FUNCTIONAL
Brand: VARIAX

## 2025-02-23 DEVICE — BONE SCREW
Type: IMPLANTABLE DEVICE | Site: ANKLE | Status: FUNCTIONAL
Brand: VARIAX

## 2025-02-23 DEVICE — ONE-THIRD TUBULAR PLATE: Type: IMPLANTABLE DEVICE | Site: ANKLE | Status: FUNCTIONAL

## 2025-02-23 DEVICE — LOCKING SCREW
Type: IMPLANTABLE DEVICE | Site: ANKLE | Status: FUNCTIONAL
Brand: VARIAX

## 2025-02-23 DEVICE — COMPRESSION PLATE NARROW STRAIGHT
Type: IMPLANTABLE DEVICE | Site: ANKLE | Status: FUNCTIONAL
Brand: VARIAX

## 2025-02-23 DEVICE — SYS SYNDESMOSIS REPR ANKL TIGHTROPE/XP KNOTLS TI: Type: IMPLANTABLE DEVICE | Site: ANKLE | Status: FUNCTIONAL

## 2025-02-23 RX ORDER — GABAPENTIN 300 MG/1
600 CAPSULE ORAL EVERY 12 HOURS SCHEDULED
Status: DISCONTINUED | OUTPATIENT
Start: 2025-02-23 | End: 2025-02-24 | Stop reason: HOSPADM

## 2025-02-23 RX ORDER — POLYETHYLENE GLYCOL 3350 17 G/17G
17 POWDER, FOR SOLUTION ORAL DAILY PRN
Status: DISCONTINUED | OUTPATIENT
Start: 2025-02-23 | End: 2025-02-24 | Stop reason: HOSPADM

## 2025-02-23 RX ORDER — OXYCODONE AND ACETAMINOPHEN 7.5; 325 MG/1; MG/1
1 TABLET ORAL EVERY 4 HOURS PRN
Status: DISCONTINUED | OUTPATIENT
Start: 2025-02-23 | End: 2025-02-24 | Stop reason: HOSPADM

## 2025-02-23 RX ORDER — LIDOCAINE HYDROCHLORIDE 20 MG/ML
INJECTION, SOLUTION EPIDURAL; INFILTRATION; INTRACAUDAL; PERINEURAL AS NEEDED
Status: DISCONTINUED | OUTPATIENT
Start: 2025-02-23 | End: 2025-02-23 | Stop reason: SURG

## 2025-02-23 RX ORDER — AMOXICILLIN 250 MG
2 CAPSULE ORAL 2 TIMES DAILY PRN
Status: DISCONTINUED | OUTPATIENT
Start: 2025-02-23 | End: 2025-02-24 | Stop reason: HOSPADM

## 2025-02-23 RX ORDER — SODIUM CHLORIDE 9 MG/ML
100 INJECTION, SOLUTION INTRAVENOUS CONTINUOUS
Status: ACTIVE | OUTPATIENT
Start: 2025-02-23 | End: 2025-02-24

## 2025-02-23 RX ORDER — MIDAZOLAM HYDROCHLORIDE 2 MG/2ML
2 INJECTION, SOLUTION INTRAMUSCULAR; INTRAVENOUS ONCE
Status: DISCONTINUED | OUTPATIENT
Start: 2025-02-23 | End: 2025-02-23 | Stop reason: SDUPTHER

## 2025-02-23 RX ORDER — OXYCODONE HYDROCHLORIDE 5 MG/1
5 TABLET ORAL
Status: DISCONTINUED | OUTPATIENT
Start: 2025-02-23 | End: 2025-02-23 | Stop reason: HOSPADM

## 2025-02-23 RX ORDER — KETOROLAC TROMETHAMINE 30 MG/ML
INJECTION, SOLUTION INTRAMUSCULAR; INTRAVENOUS AS NEEDED
Status: DISCONTINUED | OUTPATIENT
Start: 2025-02-23 | End: 2025-02-23 | Stop reason: SURG

## 2025-02-23 RX ORDER — DEXAMETHASONE SODIUM PHOSPHATE 4 MG/ML
INJECTION, SOLUTION INTRA-ARTICULAR; INTRALESIONAL; INTRAMUSCULAR; INTRAVENOUS; SOFT TISSUE
Status: COMPLETED | OUTPATIENT
Start: 2025-02-23 | End: 2025-02-23

## 2025-02-23 RX ORDER — BUPIVACAINE HYDROCHLORIDE AND EPINEPHRINE 5; 5 MG/ML; UG/ML
INJECTION, SOLUTION EPIDURAL; INTRACAUDAL; PERINEURAL
Status: COMPLETED
Start: 2025-02-23 | End: 2025-02-23

## 2025-02-23 RX ORDER — BISACODYL 5 MG/1
5 TABLET, DELAYED RELEASE ORAL DAILY PRN
Status: DISCONTINUED | OUTPATIENT
Start: 2025-02-23 | End: 2025-02-24 | Stop reason: HOSPADM

## 2025-02-23 RX ORDER — ONDANSETRON 2 MG/ML
INJECTION INTRAMUSCULAR; INTRAVENOUS AS NEEDED
Status: DISCONTINUED | OUTPATIENT
Start: 2025-02-23 | End: 2025-02-23 | Stop reason: SURG

## 2025-02-23 RX ORDER — BISACODYL 10 MG
10 SUPPOSITORY, RECTAL RECTAL DAILY PRN
Status: DISCONTINUED | OUTPATIENT
Start: 2025-02-23 | End: 2025-02-24 | Stop reason: HOSPADM

## 2025-02-23 RX ORDER — TAMSULOSIN HYDROCHLORIDE 0.4 MG/1
0.4 CAPSULE ORAL EVERY EVENING
Status: DISCONTINUED | OUTPATIENT
Start: 2025-02-23 | End: 2025-02-24 | Stop reason: HOSPADM

## 2025-02-23 RX ORDER — MIDAZOLAM HYDROCHLORIDE 2 MG/2ML
INJECTION, SOLUTION INTRAMUSCULAR; INTRAVENOUS
Status: COMPLETED
Start: 2025-02-23 | End: 2025-02-23

## 2025-02-23 RX ORDER — MEPERIDINE HYDROCHLORIDE 25 MG/ML
12.5 INJECTION INTRAMUSCULAR; INTRAVENOUS; SUBCUTANEOUS
Status: DISCONTINUED | OUTPATIENT
Start: 2025-02-23 | End: 2025-02-23 | Stop reason: HOSPADM

## 2025-02-23 RX ORDER — PROMETHAZINE HYDROCHLORIDE 25 MG/1
25 TABLET ORAL ONCE AS NEEDED
Status: DISCONTINUED | OUTPATIENT
Start: 2025-02-23 | End: 2025-02-23 | Stop reason: HOSPADM

## 2025-02-23 RX ORDER — MIDAZOLAM HYDROCHLORIDE 2 MG/2ML
2 INJECTION, SOLUTION INTRAMUSCULAR; INTRAVENOUS ONCE
Status: DISCONTINUED | OUTPATIENT
Start: 2025-02-23 | End: 2025-02-23 | Stop reason: HOSPADM

## 2025-02-23 RX ORDER — TRAZODONE HYDROCHLORIDE 50 MG/1
50 TABLET ORAL NIGHTLY
Status: DISCONTINUED | OUTPATIENT
Start: 2025-02-23 | End: 2025-02-24 | Stop reason: HOSPADM

## 2025-02-23 RX ORDER — DEXAMETHASONE SODIUM PHOSPHATE 4 MG/ML
INJECTION, SOLUTION INTRA-ARTICULAR; INTRALESIONAL; INTRAMUSCULAR; INTRAVENOUS; SOFT TISSUE
Status: COMPLETED
Start: 2025-02-23 | End: 2025-02-23

## 2025-02-23 RX ORDER — ACETAMINOPHEN 500 MG
1000 TABLET ORAL ONCE
Status: DISCONTINUED | OUTPATIENT
Start: 2025-02-23 | End: 2025-02-23 | Stop reason: SDUPTHER

## 2025-02-23 RX ORDER — SODIUM CHLORIDE, SODIUM LACTATE, POTASSIUM CHLORIDE, CALCIUM CHLORIDE 600; 310; 30; 20 MG/100ML; MG/100ML; MG/100ML; MG/100ML
9 INJECTION, SOLUTION INTRAVENOUS CONTINUOUS PRN
Status: DISCONTINUED | OUTPATIENT
Start: 2025-02-23 | End: 2025-02-23 | Stop reason: HOSPADM

## 2025-02-23 RX ORDER — LORAZEPAM 0.5 MG/1
0.5 TABLET ORAL 2 TIMES DAILY
Status: DISCONTINUED | OUTPATIENT
Start: 2025-02-23 | End: 2025-02-24 | Stop reason: HOSPADM

## 2025-02-23 RX ORDER — ACETAMINOPHEN 500 MG
1000 TABLET ORAL ONCE
Status: COMPLETED | OUTPATIENT
Start: 2025-02-23 | End: 2025-02-23

## 2025-02-23 RX ORDER — SODIUM CHLORIDE 0.9 % (FLUSH) 0.9 %
10 SYRINGE (ML) INJECTION EVERY 12 HOURS SCHEDULED
Status: DISCONTINUED | OUTPATIENT
Start: 2025-02-23 | End: 2025-02-24 | Stop reason: HOSPADM

## 2025-02-23 RX ORDER — HEPARIN SODIUM 5000 [USP'U]/ML
5000 INJECTION, SOLUTION INTRAVENOUS; SUBCUTANEOUS EVERY 12 HOURS SCHEDULED
Status: DISCONTINUED | OUTPATIENT
Start: 2025-02-23 | End: 2025-02-24 | Stop reason: HOSPADM

## 2025-02-23 RX ORDER — ONDANSETRON 2 MG/ML
4 INJECTION INTRAMUSCULAR; INTRAVENOUS EVERY 6 HOURS PRN
Status: DISCONTINUED | OUTPATIENT
Start: 2025-02-23 | End: 2025-02-24 | Stop reason: HOSPADM

## 2025-02-23 RX ORDER — ONDANSETRON 2 MG/ML
4 INJECTION INTRAMUSCULAR; INTRAVENOUS ONCE AS NEEDED
Status: DISCONTINUED | OUTPATIENT
Start: 2025-02-23 | End: 2025-02-23 | Stop reason: HOSPADM

## 2025-02-23 RX ORDER — BUPIVACAINE HYDROCHLORIDE AND EPINEPHRINE 5; 5 MG/ML; UG/ML
INJECTION, SOLUTION EPIDURAL; INTRACAUDAL; PERINEURAL
Status: COMPLETED | OUTPATIENT
Start: 2025-02-23 | End: 2025-02-23

## 2025-02-23 RX ORDER — SODIUM CHLORIDE, SODIUM LACTATE, POTASSIUM CHLORIDE, CALCIUM CHLORIDE 600; 310; 30; 20 MG/100ML; MG/100ML; MG/100ML; MG/100ML
100 INJECTION, SOLUTION INTRAVENOUS CONTINUOUS
Status: ACTIVE | OUTPATIENT
Start: 2025-02-23 | End: 2025-02-23

## 2025-02-23 RX ORDER — NALOXONE HCL 0.4 MG/ML
0.4 VIAL (ML) INJECTION
Status: DISCONTINUED | OUTPATIENT
Start: 2025-02-23 | End: 2025-02-24 | Stop reason: HOSPADM

## 2025-02-23 RX ORDER — FENTANYL CITRATE 50 UG/ML
INJECTION, SOLUTION INTRAMUSCULAR; INTRAVENOUS
Status: COMPLETED
Start: 2025-02-23 | End: 2025-02-23

## 2025-02-23 RX ORDER — FENTANYL CITRATE 50 UG/ML
INJECTION, SOLUTION INTRAMUSCULAR; INTRAVENOUS AS NEEDED
Status: DISCONTINUED | OUTPATIENT
Start: 2025-02-23 | End: 2025-02-23 | Stop reason: SURG

## 2025-02-23 RX ORDER — SODIUM CHLORIDE 0.9 % (FLUSH) 0.9 %
10 SYRINGE (ML) INJECTION AS NEEDED
Status: DISCONTINUED | OUTPATIENT
Start: 2025-02-23 | End: 2025-02-24 | Stop reason: HOSPADM

## 2025-02-23 RX ORDER — ASPIRIN 81 MG/1
81 TABLET ORAL DAILY
Status: DISCONTINUED | OUTPATIENT
Start: 2025-02-24 | End: 2025-02-24 | Stop reason: HOSPADM

## 2025-02-23 RX ORDER — CHOLECALCIFEROL (VITAMIN D3) 25 MCG
2000 TABLET ORAL DAILY
Status: DISCONTINUED | OUTPATIENT
Start: 2025-02-24 | End: 2025-02-24 | Stop reason: HOSPADM

## 2025-02-23 RX ORDER — PROMETHAZINE HYDROCHLORIDE 25 MG/1
25 SUPPOSITORY RECTAL ONCE AS NEEDED
Status: DISCONTINUED | OUTPATIENT
Start: 2025-02-23 | End: 2025-02-23 | Stop reason: HOSPADM

## 2025-02-23 RX ORDER — OXYCODONE AND ACETAMINOPHEN 7.5; 325 MG/1; MG/1
2 TABLET ORAL EVERY 4 HOURS PRN
Status: DISCONTINUED | OUTPATIENT
Start: 2025-02-23 | End: 2025-02-24 | Stop reason: HOSPADM

## 2025-02-23 RX ORDER — ROSUVASTATIN CALCIUM 5 MG/1
10 TABLET, COATED ORAL DAILY
Status: DISCONTINUED | OUTPATIENT
Start: 2025-02-24 | End: 2025-02-24 | Stop reason: HOSPADM

## 2025-02-23 RX ORDER — SODIUM CHLORIDE 9 MG/ML
40 INJECTION, SOLUTION INTRAVENOUS AS NEEDED
Status: DISCONTINUED | OUTPATIENT
Start: 2025-02-23 | End: 2025-02-24 | Stop reason: HOSPADM

## 2025-02-23 RX ORDER — MORPHINE SULFATE 2 MG/ML
2 INJECTION, SOLUTION INTRAMUSCULAR; INTRAVENOUS EVERY 4 HOURS PRN
Status: DISCONTINUED | OUTPATIENT
Start: 2025-02-23 | End: 2025-02-24 | Stop reason: HOSPADM

## 2025-02-23 RX ORDER — MAGNESIUM HYDROXIDE 1200 MG/15ML
LIQUID ORAL AS NEEDED
Status: DISCONTINUED | OUTPATIENT
Start: 2025-02-23 | End: 2025-02-23 | Stop reason: HOSPADM

## 2025-02-23 RX ORDER — OLANZAPINE 10 MG/1
5 TABLET ORAL NIGHTLY
Status: DISCONTINUED | OUTPATIENT
Start: 2025-02-23 | End: 2025-02-24 | Stop reason: HOSPADM

## 2025-02-23 RX ORDER — LEVOTHYROXINE SODIUM 50 UG/1
50 TABLET ORAL EVERY MORNING
Status: DISCONTINUED | OUTPATIENT
Start: 2025-02-24 | End: 2025-02-24 | Stop reason: HOSPADM

## 2025-02-23 RX ORDER — PROPOFOL 10 MG/ML
VIAL (ML) INTRAVENOUS AS NEEDED
Status: DISCONTINUED | OUTPATIENT
Start: 2025-02-23 | End: 2025-02-23 | Stop reason: SURG

## 2025-02-23 RX ADMIN — SODIUM CHLORIDE, SODIUM LACTATE, POTASSIUM CHLORIDE, CALCIUM CHLORIDE 9 ML/HR: 20; 30; 600; 310 INJECTION, SOLUTION INTRAVENOUS at 15:24

## 2025-02-23 RX ADMIN — PROPOFOL 150 MG: 10 INJECTION, EMULSION INTRAVENOUS at 15:32

## 2025-02-23 RX ADMIN — PHENYLEPHRINE HYDROCHLORIDE 200 MCG: 50 INJECTION INTRAVENOUS at 15:40

## 2025-02-23 RX ADMIN — GABAPENTIN 600 MG: 300 CAPSULE ORAL at 20:42

## 2025-02-23 RX ADMIN — TAMSULOSIN HYDROCHLORIDE 0.4 MG: 0.4 CAPSULE ORAL at 18:41

## 2025-02-23 RX ADMIN — MIDAZOLAM HYDROCHLORIDE 2 MG: 1 INJECTION, SOLUTION INTRAMUSCULAR; INTRAVENOUS at 14:12

## 2025-02-23 RX ADMIN — LIDOCAINE HYDROCHLORIDE 5 ML: 20 INJECTION, SOLUTION INTRAVENOUS at 15:32

## 2025-02-23 RX ADMIN — SODIUM CHLORIDE, POTASSIUM CHLORIDE, SODIUM LACTATE AND CALCIUM CHLORIDE 100 ML/HR: 600; 310; 30; 20 INJECTION, SOLUTION INTRAVENOUS at 01:00

## 2025-02-23 RX ADMIN — BUPIVACAINE HYDROCHLORIDE AND EPINEPHRINE BITARTRATE 40 ML: 5; .005 INJECTION, SOLUTION EPIDURAL; INTRACAUDAL; PERINEURAL at 14:21

## 2025-02-23 RX ADMIN — HYDROMORPHONE HYDROCHLORIDE 0.5 MG: 1 INJECTION, SOLUTION INTRAMUSCULAR; INTRAVENOUS; SUBCUTANEOUS at 07:35

## 2025-02-23 RX ADMIN — ONDANSETRON 4 MG: 2 INJECTION INTRAMUSCULAR; INTRAVENOUS at 16:56

## 2025-02-23 RX ADMIN — Medication 10 ML: at 20:41

## 2025-02-23 RX ADMIN — DEXAMETHASONE SODIUM PHOSPHATE 8 MG: 4 INJECTION, SOLUTION INTRAMUSCULAR; INTRAVENOUS at 14:21

## 2025-02-23 RX ADMIN — SODIUM CHLORIDE 2000 MG: 9 INJECTION, SOLUTION INTRAVENOUS at 15:37

## 2025-02-23 RX ADMIN — FENTANYL CITRATE 50 MCG: 50 INJECTION, SOLUTION INTRAMUSCULAR; INTRAVENOUS at 15:32

## 2025-02-23 RX ADMIN — FENTANYL CITRATE 50 MCG: 50 INJECTION, SOLUTION INTRAMUSCULAR; INTRAVENOUS at 17:02

## 2025-02-23 RX ADMIN — SODIUM CHLORIDE, POTASSIUM CHLORIDE, SODIUM LACTATE AND CALCIUM CHLORIDE: 600; 310; 30; 20 INJECTION, SOLUTION INTRAVENOUS at 14:25

## 2025-02-23 RX ADMIN — Medication 10 ML: at 07:35

## 2025-02-23 RX ADMIN — KETOROLAC TROMETHAMINE 15 MG: 15 INJECTION, SOLUTION INTRAMUSCULAR; INTRAVENOUS at 01:54

## 2025-02-23 RX ADMIN — LORAZEPAM 0.5 MG: 0.5 TABLET ORAL at 20:42

## 2025-02-23 RX ADMIN — ACETAMINOPHEN 1000 MG: 500 TABLET ORAL at 15:23

## 2025-02-23 RX ADMIN — TRAZODONE HYDROCHLORIDE 50 MG: 50 TABLET ORAL at 20:42

## 2025-02-23 RX ADMIN — HEPARIN SODIUM 5000 UNITS: 5000 INJECTION INTRAVENOUS; SUBCUTANEOUS at 20:42

## 2025-02-23 RX ADMIN — SODIUM CHLORIDE 100 ML/HR: 9 INJECTION, SOLUTION INTRAVENOUS at 20:42

## 2025-02-23 RX ADMIN — OLANZAPINE 5 MG: 10 TABLET, FILM COATED ORAL at 20:42

## 2025-02-23 RX ADMIN — KETOROLAC TROMETHAMINE 30 MG: 30 INJECTION, SOLUTION INTRAMUSCULAR; INTRAVENOUS at 16:56

## 2025-02-23 NOTE — OP NOTE
ANKLE OPEN REDUCTION INTERNAL FIXATION  Procedure Report    Patient Name:  Ronnie Baron  YOB: 1960    Date of Surgery:  2/23/2025     Indications: The patient sustained an ankle fracture after injury. Patient wishes to undergo operative treatment. Risks and benefits of operative treatment including bleeding, infection, damage to neurovascular structures, continued pain and disability, maulnion, non-union, need for additional procedures including hardware removal, among others. Informed consent was obtained and they wished to proceed.    Pre-op Diagnosis:   Closed fracture of distal end of left tibia, unspecified fracture morphology, initial encounter [S82.302A]  Closed fracture of distal end of left fibula, unspecified fracture morphology, initial encounter [S82.832A]       Post-Op Diagnosis Codes:     * Closed fracture of distal end of left tibia, unspecified fracture morphology, initial encounter [S82.302A]     * Closed fracture of distal end of left fibula, unspecified fracture morphology, initial encounter [S82.832A]  Trimalleolar ankle fracture with intra-articular displaced distal tibia fracture involving the posterior malleolus    Procedure/CPT® Codes:  No CPT Code Applied in Case Entry    Procedure(s):  LEFT ANKLE OPEN REDUCTION INTERNAL FIXATION trimalleolar ankle fracture    Staff:  Surgeon(s):  Morales Valdovinos MD    Assistant: Abdullahi Sauceda PA    Anesthesia: Choice    Estimated Blood Loss:  20cc    Implants:    Implant Name Type Inv. Item Serial No.  Lot No. LRB No. Used Action   SYS SYNDESMOSIS REPR ANKL TIGHTROPE/XP KNOTLS TI - [id number] Implant SYS SYNDESMOSIS REPR ANKL TIGHTROPE/XP KNOTLS TI  [id number] Left 1 Implanted   PLT COMPR VARIAX NRW STR 12H - [id number] Implant PLT COMPR VARIAX NRW STR 12H  NENA HAYDEE  Left 1 Implanted   SCRW BONE VARIAX FUL/THRD T10 2.7X18MM - [id number] Implant SCRW BONE VARIAX FUL/THRD T10 2.7X18MM  NENA HAYDEE   Left 1 Implanted   SCRW BONE VARIAX T10 3.5X16MM - [id number] Implant SCRW BONE VARIAX T10 3.5X16MM  NENA HAYDEE  Left 2 Implanted   SCRW NL BONE VARIAX T10 3.5X30MM - [id number] Implant SCRW NL BONE VARIAX T10 3.5X30MM  NENA HAYDEE  Left 1 Implanted   SCRW LK BONE VARIAX T10 3.5X38MM - [id number] Implant SCRW LK BONE VARIAX T10 3.5X38MM  NENA HAYDEE  Left 1 Implanted   SCRW NL BONE VARIAX T10 3.5X44MM - [id number] Implant SCRW NL BONE VARIAX T10 3.5X44MM  NENA HAYDEE  Left 1 Implanted   SCRW NL BONE VARIAX T10 3.5X46MM - [id number] Implant SCRW NL BONE VARIAX T10 3.5X46MM  NENA HAYDEE  Left 1 Implanted   SCRW BONE VARIAX T10 3.5X14MM - [id number] Implant SCRW BONE VARIAX T10 3.5X14MM  NENA HAYDEE  Left 2 Implanted   SCRW LK BONE VARIAX T10 3.5X12MM - [id number] Implant SCRW LK BONE VARIAX T10 3.5X12MM  NENA HAYDEE  Left 1 Implanted   SCRW LK BONE VARIAX T10 3.5X14MM - [id number] Implant SCRW LK BONE VARIAX T10 3.5X14MM  NENA HAYDEE  Left 3 Implanted   SCRW LK BONE VARIAX T10 3.5X16MM - [id number] Implant SCRW LK BONE VARIAX T10 3.5X16MM  NENA HAYDEE  Left 1 Implanted       Specimen:          None        Findings: Trimalleolar ankle fx dislocation    Complications: None    Description of Procedure: The operative site was marked in preoperative holding area.  The patient was brought operating room and general anesthesia was applied.  The patient received a preoperative nerve block.  He was placed supine on the OR table.  All bony prominences were padded.  A foam ramp was placed under the operative leg.  An SCD boot was placed on the contralateral leg.  The extremity was prepped and draped in usual sterile fashion.  Preoperative antibiotic was given.  A formal timeout was held.  The limb was exsanguinated with an Esmarch and tourniquet was inflated.  A longitudinal incision was made over the posterior distal fibula.  This extended to the mid leg region.  The subcutaneous tissues were bluntly dissected.  The  superficial peroneal nerve was protected.  The dissection was began posterior to the peroneal tendons where the fascia was incised and the peroneal tendons were retracted anteriorly.  A posterolateral approach was made to the distal tibia.  The fascia was incised and the FHL musculature was retracted medially.  The periosteum was incised over the posterior malleolus fracture.  The fracture was elevated and a displaced articular cartilage fragment was excised which was blocking the reduction.  This is around 1.5 cm in size.  At this point then the reduction could be obtained and was secured by a 3 hole one third tubular plate.  This was secured with two 3.5 nonlocking screws from anterior to posterior above the fracture and one 3.5 locking screw distal to the posterior malleolus fracture.  Fluoroscopy ensured adequate plate placement and posterior malleolus fracture reduction.  Once this fracture was reduced this also reduce the extent of the medial malleolus fracture which was no longer visible on x-ray.  At this point the peroneal musculature was retracted posteriorly and the fibular shaft was identified.  There was a long oblique fracture of the fibular shaft which was reduced anatomically with a serrated reduction clamp.  A 2.7 lag screw was placed anterior to posterior across this fracture to secure the reduction.  A 3.5 straight Luli compression plate was then placed to the fibula lateral surface.  This was secured with 3.5 nonlocking screws distal and proximal.  The fluoroscopic images showed the fracture anatomically reduced, the ankle mortise anatomically reduced and the hardware in good position.  Additional 3.5 locking and nonlocking screws were drilled, measured, and inserted to secure the fracture fixation.  This point there is still concern for syndesmotic injury and widening of the tibiofibular space.  Therefore a tight rope implant was drilled and inserted above the ankle joint to secure the  syndesmosis.  The tight rope implant was deployed medially and the sutures were pulled docking the tight rope button into the plate and securing the repair.  At this point the wound was irrigated and tourniquet was released.  The wound was irrigated with Irrisept and saline.  Bleeding was minimal.  Final fluoroscopic images showed the ankle fracture alignment in anatomic alignment and hardware in good position.  The subcutaneous tissues were closed with 2-0 undyed Vicryl.  The skin was closed with staples.  Xeroform, 4 x 4's, ABD, soft roll and a well-padded splint and Ace wrap were placed.  The patient woken anesthesia in stable condition.  There were no complications and all counts were correct.  The patient was stable to recovery.    Assistant: Abdullahi Sauceda PA  was responsible for performing the following activities: Retraction, Suction, Irrigation, and Placing Dressing and their skilled assistance was necessary for the success of this case.    Morales Valdovinos MD     Date: 2/23/2025  Time: 17:27 EST

## 2025-02-23 NOTE — PLAN OF CARE
Goal Outcome Evaluation:           Progress: no change  Outcome Evaluation: Patient off floor for majority of day for left ankle surgery. Pain medication given x1 per MAR. Will continue to monitor and continue plan of care.

## 2025-02-23 NOTE — ANESTHESIA PREPROCEDURE EVALUATION
Anesthesia Evaluation     Patient summary reviewed and Nursing notes reviewed   no history of anesthetic complications:   NPO Solid Status: > 8 hours  NPO Liquid Status: > 2 hours           Airway   Mallampati: III  TM distance: >3 FB  Neck ROM: full  No difficulty expected  Dental - normal exam     Pulmonary - normal exam    breath sounds clear to auscultation  (+) ,sleep apnea (non-compliant with cpap)  Cardiovascular - normal exam  Exercise tolerance: good (4-7 METS)    ECG reviewed  Rhythm: regular  Rate: normal    (+) DVT, hyperlipidemia    ROS comment: EK bpm  Sinus tachycardia  Ventricular premature complex  Right axis deviation  Low voltage, precordial leads  Consider anterior infarct  Borderline T abnormalities, inferior leads  Date and Time of Study:2025 22:37:03    Echo (24; syncope):  ·  Left ventricular ejection fraction appears to be 56 - 60%.  ·  Left ventricular diastolic function is consistent with (grade I) impaired relaxation.  ·  Estimated right ventricular systolic pressure from tricuspid regurgitation is normal (<35 mmHg).  ·  No evidence of significant valvular disease           Neuro/Psych  (+) psychiatric history Anxiety and Depression  GI/Hepatic/Renal/Endo    (+) obesity, morbid obesity, renal disease- CRI, thyroid problem hypothyroidism    Musculoskeletal (-) negative ROS    Abdominal   (+) obese   Substance History - negative use     OB/GYN negative ob/gyn ROS         Other - negative ROS       ROS/Med Hx Other: PAT Nursing Notes unavailable.                 Anesthesia Plan    ASA 3     general and regional     (Patient understands anesthesia not responsible for dental damage. Regional anesthesia options discussed with patient. Pt accepts regional block.)  intravenous induction     Anesthetic plan, risks, benefits, and alternatives have been provided, discussed and informed consent has been obtained with: patient.    Use of blood products discussed with patient .       CODE STATUS:    Level Of Support Discussed With: Patient  Code Status (Patient has no pulse and is not breathing): CPR (Attempt to Resuscitate)  Medical Interventions (Patient has pulse or is breathing): Full Support

## 2025-02-23 NOTE — PLAN OF CARE
Goal Outcome Evaluation:  VSS. AAO X 4. Slept. Need stool specimen for test. Pt noted for generalized edema.

## 2025-02-23 NOTE — ANESTHESIA POSTPROCEDURE EVALUATION
Patient: Ronnie Baron    Procedure Summary       Date: 02/23/25 Room / Location: [facility] OR 04 / [facility] MAIN OR    Anesthesia Start: 1530 Anesthesia Stop: 1720    Procedure: LEFT ANKLE OPEN REDUCTION INTERNAL FIXATION (Left: Ankle) Diagnosis:       Closed fracture of distal end of left tibia, unspecified fracture morphology, initial encounter      Closed fracture of distal end of left fibula, unspecified fracture morphology, initial encounter      (Closed fracture of distal end of left tibia, unspecified fracture morphology, initial encounter [S82.302A])      (Closed fracture of distal end of left fibula, unspecified fracture morphology, initial encounter [S82.832A])    Surgeons: Morales Valdovinos MD Provider: Magaly White MD    Anesthesia Type: general, regional ASA Status: 3            Anesthesia Type: general, regional    Vitals  Vitals Value Taken Time   /83 02/23/25 1718   Temp     Pulse 112 02/23/25 1720   Resp     SpO2 86 % 02/23/25 1720   Vitals shown include unfiled device data.        Post Anesthesia Care and Evaluation    Patient location during evaluation: bedside  Patient participation: complete - patient participated  Level of consciousness: awake  Pain management: adequate    Airway patency: patent  PONV Status: none  Cardiovascular status: acceptable and stable  Respiratory status: acceptable  Hydration status: acceptable

## 2025-02-23 NOTE — PROGRESS NOTES
[facility]   Hospitalist Progress Note  Date: 2025  Patient Name: Ronnie Baron  : 1960  MRN: [id number]  Date of admission: 2025  Room/Bed: Critical access hospital/      Subjective   Subjective     Chief Complaint: Ankle pain following slip and fall     Summary:Ronnie Baron is a 64 y.o. male with past medical history of TALON, CKD, OCD, depression, anxiety, and GERD presented to the ED for evaluation of left ankle pain following a slip and fall.  Patient states that he was walking down snowy a hill to throw out his trash when he suddenly slipped and fell injuring his left ankle.  Patient was in severe pain afterwards and was immediately brought to the ED for further evaluation and management.  In the ED patient was hypoxic on arrival requiring oxygen supplementation via nasal cannula.  Labs were relatively unremarkable given his chronic conditions.  X-ray of the lower extremity showing acute trimalleolar Wyatt type C fracture-dislocation.  Chest x-ray showed mild to moderate cardiomegaly.  When seen patient had splint placed and stated that his pain was well-controlled with medications.  When asked he denied any recent fevers, chills, headaches, focal weakness, chest pain, palpitation, shortness of breath, cough, abdominal pain, nausea, vomiting, diarrhea, constipation, dysuria, hematuria, hematochezia, melena, or anxiety.  Patient admitted for further evaluation and treatment     Interval Followup: Patient presents surgery today.  Unable to assess.  Vital stable.  No complaints as per nursing staff.    Review of Systems    All systems reviewed and negative except for what is outlined above.      Objective   Objective     Vitals:   Temp:  [97.7 °F (36.5 °C)-98.2 °F (36.8 °C)] 98.2 °F (36.8 °C)  Heart Rate:  [] 94  Resp:  [14-25] 18  BP: (112-158)/(67-98) 128/81  Flow (L/min) (Oxygen Therapy):  [2] 2      Result Review    Result Review:  I have personally reviewed these results:  [x]  Laboratory      Lab  02/22/25 1928   WBC 10.16   HEMOGLOBIN 15.3   HEMATOCRIT 45.8   PLATELETS 205   NEUTROS ABS 7.09*   IMMATURE GRANS (ABS) 0.05   LYMPHS ABS 2.11   MONOS ABS 0.75   EOS ABS 0.09   MCV 91.2         Lab 02/23/25  0510 02/22/25 1928   SODIUM 136 139   POTASSIUM 4.2 4.2   CHLORIDE 102 102   CO2 27.0 28.8   ANION GAP 7.0 8.2   BUN 17 16   CREATININE 1.53* 1.61*   EGFR 50.8* 47.8*   GLUCOSE 109* 104*   CALCIUM 8.8 9.0         Lab 02/22/25 1928   TOTAL PROTEIN 6.7   ALBUMIN 3.9   GLOBULIN 2.8   ALT (SGPT) 23   AST (SGOT) 16   BILIRUBIN 0.3   ALK PHOS 145*                     Brief Urine Lab Results  (Last result in the past 365 days)        Color   Clarity   Blood   Leuk Est   Nitrite   Protein   CREAT   Urine HCG        12/13/24 0840 Yellow   Clear   Negative   Negative   Negative   Negative                 [x]  Microbiology   Microbiology Results (last 10 days)       ** No results found for the last 240 hours. **          [x]  Radiology  CT Lower Extremity Left Without Contrast    Result Date: 2/22/2025  An acute trimalleolar Wyatt type C fracture-dislocation is suggested by CT, as discussed. Please see above comments for further detail.   Portions of this note were completed with a voice recognition program.  2/22/2025 10:45 PM by Beto Tobin MD on Workstation: [id number]      XR Chest 1 View    Result Date: 2/22/2025  Probably no acute infiltrate is appreciated. There may be mild-to-moderate cardiomegaly, probably present previously.    Portions of this note were completed with a voice recognition program.  2/22/2025 10:02 PM by Beto Tobin MD on Workstation: [id number]      XR Tibia Fibula 2 View Left    Result Date: 2/22/2025  There has been interval improvement in alignment after closed reduction of the acute left ankle fracture-dislocation. The alignment is grossly anatomic. Please see above comments for further detail.   Portions of this note were completed with a voice recognition program.  2/22/2025 9:19 PM by  Beto Tobin MD on Workstation: [id number]      XR Ankle 2 View Left    Result Date: 2/22/2025  Impression: Trimalleolar fracture dislocation as above. Electronically Signed: Sim Cleveland MD  2/22/2025 4:34 PM EST  Workstation ID: [id number]   []  EKG/Telemetry   []  Cardiology/Vascular   []  Pathology  []  Old records  []  Other:    Assessment & Plan   Assessment / Plan     Assessment:  Left trimalleolar fracture with dislocation  Status post fall  History of CKD  TALON  OCD    Plan:  Patient currently being managed medicine service.  Presented after sustaining fall and found to have left trimalleolar fracture with dislocation.  Orthopedic surgeon on board.  Undergoing surgery today.  Pain management.  Creatinine at baseline.  Continue to trend and monitor.  Lab work nonsignificant.  PT/OT.  Rest as per orthopedic surgeon.  Labs in AM.  Appropriate home medication reconciled and restarted.  Clinical course to determine disposition.     Discussed with RN.    VTE Prophylaxis:  Pharmacologic VTE prophylaxis orders are present.        CODE STATUS:   Level Of Support Discussed With: Patient  Code Status (Patient has no pulse and is not breathing): CPR (Attempt to Resuscitate)  Medical Interventions (Patient has pulse or is breathing): Full Support      Electronically signed by Kyree Barragan MD, 02/23/25, 8:50 AM EST.

## 2025-02-23 NOTE — CONSULTS
[facility]   Consult Note    Patient Name: Ronnie Baron  : 1960  MRN: [id number]  Primary Care Physician:  Marj Nolasco APRN  Referring Physician: No ref. provider found  Date of admission: 2025    Subjective   Subjective     Reason for Consult/ Chief Complaint: Left ankle fracture dislocation    HPI:  Ronnie Baron is a 64 y.o. male who slipped on the ice when taking the trash out yesterday.  He twisted and injured his ankle.  He was brought to the emergency department where he was found to have a left ankle fracture dislocation.  He underwent closed reduction and splinting of this.  He was admitted to the hospital for further evaluation and treatment.  He reports pain in the ankle.  No other complaints.    Review of Systems   All systems were reviewed and negative except for those mentioned in HPI    Personal History     Past Medical History:   Diagnosis Date   • Alteration in appetite    • Anxiety    • Chronic prostatitis 2024   • CKD (chronic kidney disease) stage 3, GFR 30-59 ml/min    • Depression     severe recurrent major depression with psychotic features   • DVT (deep venous thrombosis)    • Elevated PSA 22   • Enlarged prostate on rectal examination 2022   • Hyperglycemia    • Hyperlipidemia    • OCD (obsessive compulsive disorder)    • TALON on AUTOCPAP 2019    Moderate severity TALON with AHI 20 events per hour.  Sleep related hypoxia present.   • Renal insufficiency    • Tachycardia    • Vitamin D deficiency        Past Surgical History:   Procedure Laterality Date   • CYSTOSCOPY     • ELBOW PROCEDURE  2014       Family History: family history includes Cancer in his maternal grandmother; Depression in his father and mother; Heart disease in his maternal grandfather; Hypertension in his father and mother; Kidney disease in his mother; Stroke in his mother. Otherwise pertinent FHx was reviewed and not pertinent to current issue.    Social History:   reports that he quit smoking about 33 years ago. His smoking use included cigarettes. He started smoking about 53 years ago. He has a 30 pack-year smoking history. He has been exposed to tobacco smoke. He quit smokeless tobacco use about 34 years ago. He reports current alcohol use. He reports that he does not use drugs.    Home Medications:  LORazepam, OLANZapine, Zinc, ascorbic acid, aspirin, cholecalciferol, clomiPRAMINE, dapagliflozin Propanediol, fluvoxaMINE, gabapentin, levothyroxine, multivitamin with minerals, rosuvastatin, tadalafil, tamsulosin, and traZODone    Allergies:  No Known Allergies    Objective    Objective     Vitals:   Temp:  [97.7 °F (36.5 °C)-98.2 °F (36.8 °C)] 98.2 °F (36.8 °C)  Heart Rate:  [] 94  Resp:  [14-25] 18  BP: (112-158)/(67-98) 128/81  Flow (L/min) (Oxygen Therapy):  [2] 2    Physical Exam:   Constitutional: Awake, alert   HENT: NCAT   Neck: Supple   Respiratory: Unlabored breathing, good respiratory effort   Cardiovascular: RRR, no murmurs, rubs, or gallops, palpable pedal pulses bilaterally   Gastrointestinal: Positive bowel sounds, soft, nontender, nondistended   Musculoskeletal: Splint intact to left lower extremity.  Positive pulses.  Sensation intact to toes.  Can wiggle toes.  Good capillary refill.   Psychiatric: Appropriate affect, cooperative   Neurologic: Grossly intact   Skin: No rashes     Result Review    Result Review:  I have personally reviewed the results from the time of this admission to 2/23/2025 09:36 EST and agree with these findings:  [x]  Laboratory  []  Microbiology  [x]  Radiology  []  EKG/Telemetry   []  Cardiology/Vascular   []  Pathology  []  Old records  []  Other:    Most notable findings include: X-rays and CT scan.  Distal tibia and fibula fracture with involvement of distal fibula, medial and posterior malleolus.    Assessment & Plan   Assessment / Plan     Brief Patient Summary:  Ronnie Baron is a 64 y.o. male who has trimalleolar left  ankle fracture dislocation, closed     Active Hospital Problems:  Active Hospital Problems    Diagnosis    • **Ankle fracture    • Closed fracture of left distal tibia    • Closed fracture of left distal fibula        Plan: I discussed treatment options with the patient and his wife.  Operative versus nonoperative treatment was discussed.  Risks and benefits of surgery were discussed.  He wished to proceed with operative treatment.   plan for n.p.o. and surgery later today for open reduction internal fixation left ankle fracture.  Thank you for the consultation.        Electronically signed by Morales Valdovinos MD, 02/23/25, 9:36 AM EST.

## 2025-02-23 NOTE — PLAN OF CARE
Goal Outcome Evaluation:  VSS. AAO x 4. NPO for surgical procedure to left ankle. Pain med given x 1. Pt slept.

## 2025-02-23 NOTE — H&P (VIEW-ONLY)
[facility]   Consult Note    Patient Name: Ronnie Baron  : 1960  MRN: [id number]  Primary Care Physician:  Marj Nolasco APRN  Referring Physician: No ref. provider found  Date of admission: 2025    Subjective   Subjective     Reason for Consult/ Chief Complaint: Left ankle fracture dislocation    HPI:  Ronnie Baron is a 64 y.o. male who slipped on the ice when taking the trash out yesterday.  He twisted and injured his ankle.  He was brought to the emergency department where he was found to have a left ankle fracture dislocation.  He underwent closed reduction and splinting of this.  He was admitted to the hospital for further evaluation and treatment.  He reports pain in the ankle.  No other complaints.    Review of Systems   All systems were reviewed and negative except for those mentioned in HPI    Personal History     Past Medical History:   Diagnosis Date   • Alteration in appetite    • Anxiety    • Chronic prostatitis 2024   • CKD (chronic kidney disease) stage 3, GFR 30-59 ml/min    • Depression     severe recurrent major depression with psychotic features   • DVT (deep venous thrombosis)    • Elevated PSA 22   • Enlarged prostate on rectal examination 2022   • Hyperglycemia    • Hyperlipidemia    • OCD (obsessive compulsive disorder)    • TALON on AUTOCPAP 2019    Moderate severity TALON with AHI 20 events per hour.  Sleep related hypoxia present.   • Renal insufficiency    • Tachycardia    • Vitamin D deficiency        Past Surgical History:   Procedure Laterality Date   • CYSTOSCOPY     • ELBOW PROCEDURE  2014       Family History: family history includes Cancer in his maternal grandmother; Depression in his father and mother; Heart disease in his maternal grandfather; Hypertension in his father and mother; Kidney disease in his mother; Stroke in his mother. Otherwise pertinent FHx was reviewed and not pertinent to current issue.    Social History:   reports that he quit smoking about 33 years ago. His smoking use included cigarettes. He started smoking about 53 years ago. He has a 30 pack-year smoking history. He has been exposed to tobacco smoke. He quit smokeless tobacco use about 34 years ago. He reports current alcohol use. He reports that he does not use drugs.    Home Medications:  LORazepam, OLANZapine, Zinc, ascorbic acid, aspirin, cholecalciferol, clomiPRAMINE, dapagliflozin Propanediol, fluvoxaMINE, gabapentin, levothyroxine, multivitamin with minerals, rosuvastatin, tadalafil, tamsulosin, and traZODone    Allergies:  No Known Allergies    Objective    Objective     Vitals:   Temp:  [97.7 °F (36.5 °C)-98.2 °F (36.8 °C)] 98.2 °F (36.8 °C)  Heart Rate:  [] 94  Resp:  [14-25] 18  BP: (112-158)/(67-98) 128/81  Flow (L/min) (Oxygen Therapy):  [2] 2    Physical Exam:   Constitutional: Awake, alert   HENT: NCAT   Neck: Supple   Respiratory: Unlabored breathing, good respiratory effort   Cardiovascular: RRR, no murmurs, rubs, or gallops, palpable pedal pulses bilaterally   Gastrointestinal: Positive bowel sounds, soft, nontender, nondistended   Musculoskeletal: Splint intact to left lower extremity.  Positive pulses.  Sensation intact to toes.  Can wiggle toes.  Good capillary refill.   Psychiatric: Appropriate affect, cooperative   Neurologic: Grossly intact   Skin: No rashes     Result Review    Result Review:  I have personally reviewed the results from the time of this admission to 2/23/2025 09:36 EST and agree with these findings:  [x]  Laboratory  []  Microbiology  [x]  Radiology  []  EKG/Telemetry   []  Cardiology/Vascular   []  Pathology  []  Old records  []  Other:    Most notable findings include: X-rays and CT scan.  Distal tibia and fibula fracture with involvement of distal fibula, medial and posterior malleolus.    Assessment & Plan   Assessment / Plan     Brief Patient Summary:  Ronnie Baron is a 64 y.o. male who has trimalleolar left  ankle fracture dislocation, closed     Active Hospital Problems:  Active Hospital Problems    Diagnosis    • **Ankle fracture    • Closed fracture of left distal tibia    • Closed fracture of left distal fibula        Plan: I discussed treatment options with the patient and his wife.  Operative versus nonoperative treatment was discussed.  Risks and benefits of surgery were discussed.  He wished to proceed with operative treatment.   plan for n.p.o. and surgery later today for open reduction internal fixation left ankle fracture.  Thank you for the consultation.        Electronically signed by Morales Valdovinos MD, 02/23/25, 9:36 AM EST.

## 2025-02-23 NOTE — H&P
[facility]   HISTORY AND PHYSICAL    Patient Name: Ronnie Baron  : 1960  MRN: [id number]  Primary Care Physician:  Marj Nolasco APRN  Date of admission: 2025    Subjective   Subjective     Chief Complaint: Ankle pain following slip and fall    HPI:    Ronnie Baron is a 64 y.o. male with past medical history of TALON, CKD, OCD, depression, anxiety, and GERD presented to the ED for evaluation of left ankle pain following a slip and fall.  Patient states that he was walking down snowy a hill to throw out his trash when he suddenly slipped and fell injuring his left ankle.  Patient was in severe pain afterwards and was immediately brought to the ED for further evaluation and management.  In the ED patient was hypoxic on arrival requiring oxygen supplementation via nasal cannula.  Labs were relatively unremarkable given his chronic conditions.  X-ray of the lower extremity showing acute trimalleolar Wyatt type C fracture-dislocation.  Chest x-ray showed mild to moderate cardiomegaly.  When seen patient had splint placed and stated that his pain was well-controlled with medications.  When asked he denied any recent fevers, chills, headaches, focal weakness, chest pain, palpitation, shortness of breath, cough, abdominal pain, nausea, vomiting, diarrhea, constipation, dysuria, hematuria, hematochezia, melena, or anxiety.  Patient admitted for further evaluation and treatment    Review of Systems   All systems were reviewed and negative except for: As per HPI    Personal History     Past Medical History:   Diagnosis Date   • Alteration in appetite    • Anxiety    • Chronic prostatitis 2024   • CKD (chronic kidney disease) stage 3, GFR 30-59 ml/min    • Depression     severe recurrent major depression with psychotic features   • DVT (deep venous thrombosis)    • Elevated PSA 22   • Enlarged prostate on rectal examination 2022   • Hyperglycemia    • Hyperlipidemia    • OCD (obsessive  compulsive disorder)    • TALON on AUTOCPAP 02/26/2019    Moderate severity TALON with AHI 20 events per hour.  Sleep related hypoxia present.   • Renal insufficiency    • Tachycardia    • Vitamin D deficiency        Past Surgical History:   Procedure Laterality Date   • CYSTOSCOPY     • ELBOW PROCEDURE  06/2014       Family History: family history includes Cancer in his maternal grandmother; Depression in his father and mother; Heart disease in his maternal grandfather; Hypertension in his father and mother; Kidney disease in his mother; Stroke in his mother. Otherwise pertinent FHx was reviewed and not pertinent to current issue.    Social History:  reports that he quit smoking about 33 years ago. His smoking use included cigarettes. He started smoking about 53 years ago. He has a 30 pack-year smoking history. He has been exposed to tobacco smoke. He quit smokeless tobacco use about 34 years ago. He reports current alcohol use. He reports that he does not use drugs.    Home Medications:  LORazepam, OLANZapine, aspirin, carbamide peroxide, cholecalciferol, clomiPRAMINE, dapagliflozin Propanediol, fluticasone, fluvoxaMINE, gabapentin, levothyroxine, multivitamin with minerals, rosuvastatin, tadalafil, tamsulosin, and traZODone      Allergies:  No Known Allergies    Objective   Objective     Vitals:   Temp:  [97.8 °F (36.6 °C)] 97.8 °F (36.6 °C)  Heart Rate:  [100-120] 102  Resp:  [14-25] 14  BP: (112-158)/(82-98) 131/83  Physical Exam    Constitutional: Awake, alert   Eyes: PERRLA, sclerae anicteric, no conjunctival injection   HENT: NCAT, mucous membranes moist   Neck: Supple, no thyromegaly, no lymphadenopathy, trachea midline   Respiratory: Clear to auscultation bilaterally, nonlabored respirations    Cardiovascular: Tachycardia, no murmurs, rubs, or gallops, palpable pedal pulses bilaterally   Gastrointestinal: Positive bowel sounds, soft, nontender, nondistended   Musculoskeletal: Left ankle splint, limited range  of motion,, no clubbing or cyanosis to extremities   Psychiatric: Appropriate affect, cooperative   Neurologic: Oriented x 3, strength symmetric in all extremities, Cranial Nerves grossly intact to confrontation, speech clear   Skin: No rashes     Result Review    Result Review:  I have personally reviewed the results from the time of this admission to 2/22/2025 22:37 EST and agree with these findings:  [x]  Laboratory list / accordion  []  Microbiology  [x]  Radiology  [x]  EKG/Telemetry   []  Cardiology/Vascular   []  Pathology  []  Old records  []  Other:  Most notable findings include: Labs were relatively unremarkable given his chronic conditions.  X-ray of the lower extremity showing acute trimalleolar Wyatt type C fracture-dislocation.  Chest x-ray showed mild to moderate cardiomegaly.       Assessment & Plan   Assessment / Plan     Brief Patient Summary:  Ronnie Baron is a 64 y.o. male with past medical history of TALON, CKD, OCD, depression, anxiety, and GERD presented to the ED for evaluation of left ankle pain following a slip and fall.      Active Hospital Problems:  Active Hospital Problems    Diagnosis    • **Ankle fracture      Plan:     Left ankle fracture  -Admit to medical floor  -Imaging reviewed  -Pain Meds PRN  -Orthopedic Surgery Consulted  -NPO after midnight  -IVF  -PT/OT  -CBC, CMP, INR, EKG  -Supportive care    CKD  TALON  OCD    GI ppx  DVT ppx        VTE Prophylaxis:  Pharmacologic VTE prophylaxis orders are signed & held.          CODE STATUS:    Level Of Support Discussed With: Patient  Code Status (Patient has no pulse and is not breathing): CPR (Attempt to Resuscitate)  Medical Interventions (Patient has pulse or is breathing): Full Support    Admission Status:  I believe this patient meets inpatient status.      Electronically signed by Wilber Cruz MD, 02/22/25, 10:37 PM EST.

## 2025-02-24 ENCOUNTER — READMISSION MANAGEMENT (OUTPATIENT)
Dept: CALL CENTER | Facility: HOSPITAL | Age: 65
End: 2025-02-24
Payer: COMMERCIAL

## 2025-02-24 VITALS
SYSTOLIC BLOOD PRESSURE: 155 MMHG | HEIGHT: 69 IN | OXYGEN SATURATION: 92 % | RESPIRATION RATE: 18 BRPM | DIASTOLIC BLOOD PRESSURE: 80 MMHG | TEMPERATURE: 97.7 F | BODY MASS INDEX: 37.03 KG/M2 | HEART RATE: 117 BPM | WEIGHT: 250 LBS

## 2025-02-24 LAB
ANION GAP SERPL CALCULATED.3IONS-SCNC: 9.5 MMOL/L (ref 5–15)
BASOPHILS # BLD AUTO: 0.01 10*3/MM3 (ref 0–0.2)
BASOPHILS NFR BLD AUTO: 0.1 % (ref 0–1.5)
BUN SERPL-MCNC: 20 MG/DL (ref 8–23)
BUN/CREAT SERPL: 13.9 (ref 7–25)
CALCIUM SPEC-SCNC: 8.7 MG/DL (ref 8.6–10.5)
CHLORIDE SERPL-SCNC: 102 MMOL/L (ref 98–107)
CO2 SERPL-SCNC: 25.5 MMOL/L (ref 22–29)
CREAT SERPL-MCNC: 1.44 MG/DL (ref 0.76–1.27)
DEPRECATED RDW RBC AUTO: 46 FL (ref 37–54)
EGFRCR SERPLBLD CKD-EPI 2021: 54.6 ML/MIN/1.73
EOSINOPHIL # BLD AUTO: 0 10*3/MM3 (ref 0–0.4)
EOSINOPHIL NFR BLD AUTO: 0 % (ref 0.3–6.2)
ERYTHROCYTE [DISTWIDTH] IN BLOOD BY AUTOMATED COUNT: 13.2 % (ref 12.3–15.4)
GLUCOSE SERPL-MCNC: 126 MG/DL (ref 65–99)
HCT VFR BLD AUTO: 44.7 % (ref 37.5–51)
HGB BLD-MCNC: 14.6 G/DL (ref 13–17.7)
IMM GRANULOCYTES # BLD AUTO: 0.03 10*3/MM3 (ref 0–0.05)
IMM GRANULOCYTES NFR BLD AUTO: 0.4 % (ref 0–0.5)
LYMPHOCYTES # BLD AUTO: 0.54 10*3/MM3 (ref 0.7–3.1)
LYMPHOCYTES NFR BLD AUTO: 7.4 % (ref 19.6–45.3)
MAGNESIUM SERPL-MCNC: 2.1 MG/DL (ref 1.6–2.4)
MCH RBC QN AUTO: 30.8 PG (ref 26.6–33)
MCHC RBC AUTO-ENTMCNC: 32.7 G/DL (ref 31.5–35.7)
MCV RBC AUTO: 94.3 FL (ref 79–97)
MONOCYTES # BLD AUTO: 0.28 10*3/MM3 (ref 0.1–0.9)
MONOCYTES NFR BLD AUTO: 3.9 % (ref 5–12)
NEUTROPHILS NFR BLD AUTO: 6.39 10*3/MM3 (ref 1.7–7)
NEUTROPHILS NFR BLD AUTO: 88.2 % (ref 42.7–76)
NRBC BLD AUTO-RTO: 0 /100 WBC (ref 0–0.2)
PHOSPHATE SERPL-MCNC: 2.9 MG/DL (ref 2.5–4.5)
PLATELET # BLD AUTO: 188 10*3/MM3 (ref 140–450)
PMV BLD AUTO: 10.3 FL (ref 6–12)
POTASSIUM SERPL-SCNC: 4.8 MMOL/L (ref 3.5–5.2)
RBC # BLD AUTO: 4.74 10*6/MM3 (ref 4.14–5.8)
SODIUM SERPL-SCNC: 137 MMOL/L (ref 136–145)
WBC NRBC COR # BLD AUTO: 7.25 10*3/MM3 (ref 3.4–10.8)

## 2025-02-24 PROCEDURE — 83735 ASSAY OF MAGNESIUM: CPT | Performed by: STUDENT IN AN ORGANIZED HEALTH CARE EDUCATION/TRAINING PROGRAM

## 2025-02-24 PROCEDURE — 97161 PT EVAL LOW COMPLEX 20 MIN: CPT

## 2025-02-24 PROCEDURE — 94761 N-INVAS EAR/PLS OXIMETRY MLT: CPT

## 2025-02-24 PROCEDURE — 94799 UNLISTED PULMONARY SVC/PX: CPT

## 2025-02-24 PROCEDURE — 85025 COMPLETE CBC W/AUTO DIFF WBC: CPT | Performed by: STUDENT IN AN ORGANIZED HEALTH CARE EDUCATION/TRAINING PROGRAM

## 2025-02-24 PROCEDURE — 25010000002 CEFAZOLIN PER 500 MG: Performed by: ORTHOPAEDIC SURGERY

## 2025-02-24 PROCEDURE — 25810000003 SODIUM CHLORIDE 0.9 % SOLUTION: Performed by: ORTHOPAEDIC SURGERY

## 2025-02-24 PROCEDURE — 84100 ASSAY OF PHOSPHORUS: CPT | Performed by: STUDENT IN AN ORGANIZED HEALTH CARE EDUCATION/TRAINING PROGRAM

## 2025-02-24 PROCEDURE — 25010000002 HEPARIN (PORCINE) PER 1000 UNITS: Performed by: ORTHOPAEDIC SURGERY

## 2025-02-24 PROCEDURE — 99239 HOSP IP/OBS DSCHRG MGMT >30: CPT | Performed by: STUDENT IN AN ORGANIZED HEALTH CARE EDUCATION/TRAINING PROGRAM

## 2025-02-24 PROCEDURE — 80048 BASIC METABOLIC PNL TOTAL CA: CPT | Performed by: STUDENT IN AN ORGANIZED HEALTH CARE EDUCATION/TRAINING PROGRAM

## 2025-02-24 RX ORDER — CEPHALEXIN 500 MG/1
500 CAPSULE ORAL EVERY 12 HOURS
Qty: 10 CAPSULE | Refills: 0 | Status: SHIPPED | OUTPATIENT
Start: 2025-02-24

## 2025-02-24 RX ORDER — OXYCODONE AND ACETAMINOPHEN 7.5; 325 MG/1; MG/1
1 TABLET ORAL EVERY 4 HOURS PRN
Qty: 36 TABLET | Refills: 0 | Status: SHIPPED | OUTPATIENT
Start: 2025-02-24

## 2025-02-24 RX ADMIN — Medication 2000 UNITS: at 08:15

## 2025-02-24 RX ADMIN — Medication 10 ML: at 08:17

## 2025-02-24 RX ADMIN — SODIUM CHLORIDE 2000 MG: 9 INJECTION, SOLUTION INTRAVENOUS at 03:42

## 2025-02-24 RX ADMIN — ASPIRIN 81 MG: 81 TABLET, COATED ORAL at 08:16

## 2025-02-24 RX ADMIN — EMPAGLIFLOZIN 25 MG: 10 TABLET, FILM COATED ORAL at 08:15

## 2025-02-24 RX ADMIN — LORAZEPAM 0.5 MG: 0.5 TABLET ORAL at 08:17

## 2025-02-24 RX ADMIN — SODIUM CHLORIDE 2000 MG: 9 INJECTION, SOLUTION INTRAVENOUS at 11:59

## 2025-02-24 RX ADMIN — HEPARIN SODIUM 5000 UNITS: 5000 INJECTION INTRAVENOUS; SUBCUTANEOUS at 08:14

## 2025-02-24 RX ADMIN — SODIUM CHLORIDE 100 ML/HR: 9 INJECTION, SOLUTION INTRAVENOUS at 06:04

## 2025-02-24 RX ADMIN — GABAPENTIN 600 MG: 300 CAPSULE ORAL at 08:15

## 2025-02-24 RX ADMIN — ROSUVASTATIN CALCIUM 10 MG: 5 TABLET, FILM COATED ORAL at 08:16

## 2025-02-24 RX ADMIN — LEVOTHYROXINE SODIUM 50 MCG: 50 TABLET ORAL at 06:04

## 2025-02-24 NOTE — DISCHARGE SUMMARY
[facility]         HOSPITALIST  DISCHARGE SUMMARY    Patient Name: Ronnie Baron  : 1960  MRN: [id number]    Date of Admission: 2025  Date of Discharge:  2025  Primary Care Physician: Marj Nolasco APRN    Consults       Date and Time Order Name Status Description    2025 10:03 PM Hospitalist (on-call MD unless specified)              Active and Resolved Hospital Problems:  Active Hospital Problems    Diagnosis POA   • **Ankle fracture [S82.899A] Yes   • Closed fracture of left distal tibia [S82.302A] Unknown   • Closed fracture of left distal fibula [S82.832A] Unknown      Resolved Hospital Problems   No resolved problems to display.       Hospital Course     Hospital Course:  Ronnie Baron is a 64 y.o. male with past medical history of TALON, CKD, OCD, depression, anxiety, and GERD presented to the ED for evaluation of left ankle pain following a slip and fall. Patient states that he was walking down snowy a hill to throw out his trash when he suddenly slipped and fell injuring his left ankle. Patient was in severe pain afterwards and was immediately brought to the ED for further evaluation and management. In the ED patient was hypoxic on arrival requiring oxygen supplementation via nasal cannula. Labs were relatively unremarkable given his chronic conditions. X-ray of the lower extremity showing acute trimalleolar Wyatt type C fracture-dislocation. Chest x-ray showed mild to moderate cardiomegaly. When seen patient had splint placed and stated that his pain was well-controlled with medications. When asked he denied any recent fevers, chills, headaches, focal weakness, chest pain, palpitation, shortness of breath, cough, abdominal pain, nausea, vomiting, diarrhea, constipation, dysuria, hematuria, hematochezia, melena, or anxiety. Patient admitted for further evaluation and treatment. Orthopedic Surgeon on board. Underwent LEFT ANKLE OPEN REDUCTION INTERNAL FIXATION  trimalleolar ankle fracture on 02/23/2025. Tolerated the procedure well. Worked with PTJosr Rizvi to be discharged from Orthopedic Surgeon's standpoint.    Patient is being discharged home with [facility] today. He is stable at the time of discharge. Follow up with PCP and Orthopedic Surgeon as outpatient.  Rest as per Orthopedic Surgeon. Fall precautions.        DISCHARGE Follow Up Recommendations for labs and diagnostics: as mentioned above.      Day of Discharge     Vital Signs:  Temp:  [97.3 °F (36.3 °C)-98.7 °F (37.1 °C)] 97.7 °F (36.5 °C)  Heart Rate:  [106-121] 117  Resp:  [11-24] 18  BP: (104-172)/() 155/80  Flow (L/min) (Oxygen Therapy):  [1-6] 1  Physical Exam:   General: alert, awake, NAD.  Heart: RRR, no murmurs  Respi: CTLAB, no wheeze    Discharge Details        Discharge Medications        New Medications        Instructions Start Date   apixaban 2.5 MG tablet tablet  Commonly known as: ELIQUIS   2.5 mg, Oral, 2 Times Daily      cephalexin 500 MG capsule  Commonly known as: KEFLEX   500 mg, Oral, Every 12 Hours      naloxone 4 MG/0.1ML nasal spray  Commonly known as: NARCAN   Call 911. Don't prime. [place] in 1 nostril for overdose. Repeat in 2-3 minutes in other nostril if no or minimal breathing/responsiveness.      oxyCODONE-acetaminophen 7.5-325 MG per tablet  Commonly known as: PERCOCET   1 tablet, Oral, Every 4 Hours PRN             Continue These Medications        Instructions Start Date   ascorbic acid 500 MG tablet  Commonly known as: VITAMIN C   500 mg, Daily      cholecalciferol 25 MCG (1000 UT) tablet  Commonly known as: VITAMIN D3   2,000 Units, Daily      clomiPRAMINE 50 MG capsule  Commonly known as: ANAFRANIL   100 mg, Nightly      Farxiga 10 MG tablet  Generic drug: dapagliflozin Propanediol   1 tablet, Daily      fluvoxaMINE 100 MG tablet  Commonly known as: LUVOX   200 mg, Nightly      gabapentin 600 MG tablet  Commonly known as: NEURONTIN   take 1 tablet by mouth TWICE a day       levothyroxine 50 MCG tablet  Commonly known as: Synthroid   50 mcg, Oral, Every Morning      LORazepam 0.5 MG tablet  Commonly known as: ATIVAN   0.5 mg, 2 Times Daily      multivitamin with minerals tablet tablet   1 tablet, Daily      OLANZapine 5 MG tablet  Commonly known as: zyPREXA   5 mg, Nightly      rosuvastatin 10 MG tablet  Commonly known as: CRESTOR   10 mg, Oral, Daily, for cholesterol      tadalafil 5 MG tablet  Commonly known as: CIALIS   5 mg, Oral, Daily PRN      tamsulosin 0.4 MG capsule 24 hr capsule  Commonly known as: FLOMAX   0.4 mg, Oral, Every Evening, 30 minutes after meal      traZODone 50 MG tablet  Commonly known as: DESYREL   50 mg, Nightly      Zinc 50 MG tablet   50 mg, Daily             Stop These Medications      aspirin 81 MG EC tablet              No Known Allergies    [place] [facility] [place]  Hospital:  Diet Order   Procedures   • Diet: Regular/House; Fluid Consistency: Thin (IDDSI 0)       Discharge Activity:   Activity Instructions       Discharge Activity      May discharge home later today if able  Follow-up 2 weeks  Call with any problems  Non-Weightbearing, may use knee scooter for ambulation or crutches/walker  Keep splint intact until follow-up  Ice and elevate  Keflex, Eliquis, Percocet prescription            CODE STATUS:  Code Status and Medical Interventions: CPR (Attempt to Resuscitate); Full Support   Ordered at: 02/22/25 2237     Level Of Support Discussed With:    Patient     Code Status (Patient has no pulse and is not breathing):    CPR (Attempt to Resuscitate)     Medical Interventions (Patient has pulse or is breathing):    Full Support       Future Appointments   Date Time Provider Department Center   3/5/2025  8:30 AM Julia Keyes PA-C [facility]   3/10/2025  1:15 PM Abdullahi Sauceda PA [facility] [place] [facility]   8/19/2025  8:30 AM Antionette Boss MD [facility] THEO [facility]       Additional Instructions for the  Follow-ups that You Need to Schedule       Discharge Follow-up with PCP   As directed       Currently Documented PCP:    Marj Nolasco APRN    PCP Phone Number:    [phone]     Follow Up Details: In 3-7 days; needs CBC and chemistries trended during follwow up.        Discharge Follow-up with Specified Provider: Sohail CARCAMO; 2 Weeks   As directed      To: Sohail CARCAMO   Follow Up: 2 Weeks                Pertinent  and/or Most Recent Results     PROCEDURES:       LAB RESULTS:      Lab 02/24/25  0420 02/22/25  1928   WBC 7.25 10.16   HEMOGLOBIN 14.6 15.3   HEMATOCRIT 44.7 45.8   PLATELETS 188 205   NEUTROS ABS 6.39 7.09*   IMMATURE GRANS (ABS) 0.03 0.05   LYMPHS ABS 0.54* 2.11   MONOS ABS 0.28 0.75   EOS ABS 0.00 0.09   MCV 94.3 91.2         Lab 02/24/25  0420 02/23/25  0510 02/22/25  1928   SODIUM 137 136 139   POTASSIUM 4.8 4.2 4.2   CHLORIDE 102 102 102   CO2 25.5 27.0 28.8   ANION GAP 9.5 7.0 8.2   BUN 20 17 16   CREATININE 1.44* 1.53* 1.61*   EGFR 54.6* 50.8* 47.8*   GLUCOSE 126* 109* 104*   CALCIUM 8.7 8.8 9.0   MAGNESIUM 2.1  --   --    PHOSPHORUS 2.9  --   --          Lab 02/22/25 1928   TOTAL PROTEIN 6.7   ALBUMIN 3.9   GLOBULIN 2.8   ALT (SGPT) 23   AST (SGOT) 16   BILIRUBIN 0.3   ALK PHOS 145*                     Brief Urine Lab Results  (Last result in the past 365 days)        Color   Clarity   Blood   Leuk Est   Nitrite   Protein   CREAT   Urine HCG        12/13/24 0840 Yellow   Clear   Negative   Negative   Negative   Negative                 Microbiology Results (last 10 days)       ** No results found for the last 240 hours. **            CT Lower Extremity Left Without Contrast    Result Date: 2/22/2025  An acute trimalleolar Wyatt type C fracture-dislocation is suggested by CT, as discussed. Please see above comments for further detail.   Portions of this note were completed with a voice recognition program.  2/22/2025 10:45 PM by Beto Tobin MD on Workstation: [id number]      XR Chest  1 View    Result Date: 2/22/2025  Probably no acute infiltrate is appreciated. There may be mild-to-moderate cardiomegaly, probably present previously.    Portions of this note were completed with a voice recognition program.  2/22/2025 10:02 PM by Beto Tobin MD on Workstation: [id number]      XR Tibia Fibula 2 View Left    Result Date: 2/22/2025  There has been interval improvement in alignment after closed reduction of the acute left ankle fracture-dislocation. The alignment is grossly anatomic. Please see above comments for further detail.   Portions of this note were completed with a voice recognition program.  2/22/2025 9:19 PM by Beto Tobin MD on Workstation: [id number]      XR Ankle 2 View Left    Result Date: 2/22/2025  Impression: Trimalleolar fracture dislocation as above. Electronically Signed: Sim Cleveland MD  2/22/2025 4:34 PM EST  Workstation ID: [id number]      Results for orders placed in visit on 04/24/19    SCANNED VASCULAR STUDIES      Results for orders placed in visit on 04/24/19    SCANNED VASCULAR STUDIES      Results for orders placed during the hospital encounter of 07/25/24    Adult Transthoracic Echo Complete W/ Cont if Necessary Per Protocol    Interpretation Summary  •  Left ventricular ejection fraction appears to be 56 - 60%.  •  Left ventricular diastolic function is consistent with (grade I) impaired relaxation.  •  Estimated right ventricular systolic pressure from tricuspid regurgitation is normal (<35 mmHg).  •  No evidence of significant valvular disease      Labs Pending at Discharge:        Time spent on Discharge including face to face service:  35 minutes    Electronically signed by Kyree Barragan MD, 02/24/25, 8:53 AM EST.

## 2025-02-24 NOTE — PLAN OF CARE
Goal Outcome Evaluation:   Pt slept well during the shift.  He had no complaints during the shift. No other changes noted during the shift.

## 2025-02-24 NOTE — THERAPY EVALUATION
Acute Care - Physical Therapy Initial Evaluation   [facility]     Patient Name: Ronnie Baron  : 1960  MRN: [id number]  Today's Date: 2025      Visit Dx:     ICD-10-CM ICD-9-CM   1. Closed fracture of distal end of left tibia, unspecified fracture morphology, initial encounter  S82.302A 824.8   2. Closed fracture of distal end of left fibula, unspecified fracture morphology, initial encounter  S82.832A 824.8   3. Difficulty walking  R26.2 719.7     Patient Active Problem List   Diagnosis    Anxiety    OCD (obsessive compulsive disorder)    Severe recurrent major depression with psychotic features    CKD (chronic kidney disease) stage 3, GFR 30-59 ml/min    Hyperglycemia    Hyperlipidemia    Vitamin D deficiency    History of deep vein thrombosis (DVT) of lower extremity    Erythrocytosis    Hypothyroidism, acquired    Obstructive sleep apnea treated with auto CPAP    Sleep-related hypoxia    Hypersomnia due to medical condition    Class 2 severe obesity due to excess calories with serious comorbidity and body mass index (BMI) of 39.0 to 39.9 in adult    Impaired fasting glucose    LFT elevation    Acute kidney failure    Long term (current) use of non-steroidal anti-inflammatories (nsaid)    Personal history of other venous thrombosis and embolism    Dyslipidemia    Prediabetes    Elevated prostate specific antigen (PSA)    Enlarged prostate on rectal examination    Bladder outlet obstruction    History of elevated prostate specific antigen (PSA)    History of prostatitis    BPH with urinary obstruction    Chronic prostatitis    Ankle fracture    Closed fracture of left distal tibia    Closed fracture of left distal fibula     Past Medical History:   Diagnosis Date    Alteration in appetite     Anxiety     Chronic prostatitis 2024    CKD (chronic kidney disease) stage 3, GFR 30-59 ml/min     Depression     severe recurrent major depression with psychotic features    DVT (deep venous thrombosis)      Elevated PSA 09/22/22    Enlarged prostate on rectal examination 09/26/2022    Hyperglycemia     Hyperlipidemia     OCD (obsessive compulsive disorder)     TALON on AUTOCPAP 02/26/2019    Moderate severity TALON with AHI 20 events per hour.  Sleep related hypoxia present.    Renal insufficiency     Tachycardia     Vitamin D deficiency      Past Surgical History:   Procedure Laterality Date    CYSTOSCOPY      ELBOW PROCEDURE  06/2014     PT Assessment (Last 12 Hours)       PT Evaluation and Treatment       Row Name 02/24/25 1300          Physical Therapy Time and Intention    Document Type evaluation  -AV     Mode of Treatment individual therapy;physical therapy  -AV       Row Name 02/24/25 1300          General Information    Patient Profile Reviewed yes  -AV     Patient Observations alert;cooperative;agree to therapy  -AV     Prior Level of Function independent:;all household mobility;gait;transfer;ADL's  Ambulated without an assistive device. Spouse reports having a w/c, will be borrowing a RW and hopeful to rent a knee scooter to use following discharge. No home O2.  -AV     Equipment Currently Used at Home wheelchair  Will be borrowing a RW, renting knee scooter to use following discharge.  -AV     Existing Precautions/Restrictions fall;non-weight bearing  NWB LLE  -AV       Row Name 02/24/25 1300          Living Environment    Current Living Arrangements home  -AV     Home Accessibility stairs to enter home  -AV     People in Home spouse  -AV       Row Name 02/24/25 1300          Home Main Entrance    Number of Stairs, Main Entrance other (see comments)  Ramps  -AV       Row Name 02/24/25 1300          Cognition    Orientation Status (Cognition) oriented x 3  -AV       Row Name 02/24/25 1300          Range of Motion (ROM)    Range of Motion right lower extremity ROM detail;left lower extremity ROM detail  -AV     Left Lower Extremity (ROM) left LE ROM is WFL except;ankle  -AV     Ankle, Left (Range of Motion) Not  tested: immobilized in splint  -AV     Right Lower Extremity (ROM) right LE ROM is WFL  -AV       Row Name 02/24/25 1300          Strength (Manual Muscle Testing)    Strength (Manual Muscle Testing) left lower extremity strength detail;right lower extremity strength detail  -AV     Left Lower Extremity Strength left LE strength is WFL except;ankle  -AV     Right Lower Extremity Strength right LE strength is WFL  -AV     Ankle, Left (Strength) Not tested: immobilized in spliny  -AV       Row Name 02/24/25 1300          Mobility    Extremity Weight-bearing Status left lower extremity  -AV     Left Lower Extremity (Weight-bearing Status) non weight-bearing (NWB)  -AV       Row Name 02/24/25 1300          Bed Mobility    Bed Mobility bed mobility (all) activities  -AV     All Activities, [place] (Bed Mobility) modified independence  -AV       Row Name 02/24/25 1300          Transfers    Transfers sit-stand transfer;stand-sit transfer  -AV     Comment, (Transfers) Educated patient on appropriate hand placement to complete sit<>stand and positioning with knee scooter when completing transfers to decrease risk of falls.  -AV       Row Name 02/24/25 1300          Sit-Stand Transfer    Sit-Stand [place] (Transfers) contact guard  -AV     Assistive Device (Sit-Stand Transfers) walker, front-wheeled  -AV       Row Name 02/24/25 1300          Stand-Sit Transfer    Stand-Sit [place] (Transfers) contact guard  -AV     Assistive Device (Stand-Sit Transfers) walker, front-wheeled  -AV       Row Name 02/24/25 1300          Gait/Stairs (Locomotion)    Comment, (Gait/Stairs) Patient ambulated 150' with knee scooter and CGA. Also ambulated an additional 40' with RW and CGA.  -AV       Row Name 02/24/25 1300          Safety Issues/Impairments Affecting Functional Mobility    Impairments Affecting Function (Mobility) balance;endurance/activity tolerance  -AV       Row Name 02/24/25 1300          Balance    Balance  Assessment standing dynamic balance  -AV     Dynamic Standing Balance contact guard  -AV     Position/Device Used, Standing Balance supported;walker, donny  -AV       Row Name             Wound 02/23/25 1551 Left anterior ankle    Wound - Properties Group Placement Date: 02/23/25 -KK Placement Time: 1551 -KK Side: Left  -KK Orientation: anterior  -KK Location: ankle  -KK Primary Wound Type: Incision  -KK Present on Original Admission: N  -KK    Retired Wound - Properties Group Placement Date: 02/23/25 -KK Placement Time: 1551 -KK Present on Original Admission: N  -KK Side: Left  -KK Orientation: anterior  -KK Location: ankle  -KK Primary Wound Type: Incision  -KK    Retired Wound - Properties Group Placement Date: 02/23/25 -KK Placement Time: 1551 -KK Present on Original Admission: N  -KK Side: Left  -KK Orientation: anterior  -KK Location: ankle  -KK Primary Wound Type: Incision  -KK    Retired Wound - Properties Group Date first assessed: 02/23/25 -KK Time first assessed: 1551 -KK Present on Original Admission: N  -KK Side: Left  -KK Location: ankle  -KK Primary Wound Type: Incision  -KK      Row Name 02/24/25 1300          Plan of Care Review    Plan of Care Reviewed With patient;spouse  -AV     Progress no change  -AV     Outcome Evaluation Patient presents with deficits in balance, endurance, transfers, and ambulation. Patient will benefit from skilled PT servies to address these mobility deficits and decrease risk of falls.  -AV       Row Name 02/24/25 1300          Positioning and Restraints    Pre-Treatment Position in bed  -AV     Post Treatment Position bed  -AV     In Bed fowlers;call light within reach;encouraged to call for assist;with family/caregiver  No alarms active upon entry  -AV       Row Name 02/24/25 1300          Therapy Assessment/Plan (PT)    Rehab Potential (PT) good  -AV     Criteria for Skilled Interventions Met (PT) yes;meets criteria  -AV     Therapy Frequency (PT) daily  -AV      Predicted Duration of Therapy Intervention (PT) 10 days  -AV     Problem List (PT) problems related to;balance;mobility;strength  -AV     Activity Limitations Related to Problem List (PT) unable to transfer safely;unable to ambulate safely  -AV       Row Name 02/24/25 1300          PT Evaluation Complexity    History, PT Evaluation Complexity no personal factors and/or comorbidities  -AV     Examination of Body Systems (PT Eval Complexity) total of 4 or more elements  -AV     Clinical Presentation (PT Evaluation Complexity) stable  -AV     Clinical Decision Making (PT Evaluation Complexity) low complexity  -AV     Overall Complexity (PT Evaluation Complexity) low complexity  -AV       Row Name 02/24/25 1300          Therapy Plan Review/Discharge Plan (PT)    Therapy Plan Review (PT) evaluation/treatment results reviewed;patient;spouse/significant other  -AV       Row Name 02/24/25 1300          Physical Therapy Goals    Transfer Goal Selection (PT) transfer, PT goal 1  -AV     Gait Training Goal Selection (PT) gait training, PT goal 1  -AV       Row Name 02/24/25 1300          Transfer Goal 1 (PT)    Activity/Assistive Device (Transfer Goal 1, PT) sit-to-stand/stand-to-sit;bed-to-chair/chair-to-bed;walker, rolling  -AV     [place] Level/Cues Needed (Transfer Goal 1, PT) modified independence  -AV     Time Frame (Transfer Goal 1, PT) 10 days  -AV       Row Name 02/24/25 1300          Gait Training Goal 1 (PT)    Activity/Assistive Device (Gait Training Goal 1, PT) gait (walking locomotion);assistive device use  knee scooter  -AV     [place] Level (Gait Training Goal 1, PT) modified independence  -AV     Distance (Gait Training Goal 1, PT) 300  -AV     Time Frame (Gait Training Goal 1, PT) 10 days  -AV               User Key  (r) = Recorded By, (t) = Taken By, (c) = Cosigned By      Initials Name Provider Type    AV Twan Barnes, PT Physical Therapist    Heather Schuler, RN Registered Nurse                     Physical Therapy Education       Title: PT OT SLP Therapies (In Progress)       Topic: Physical Therapy (In Progress)       Point: Mobility training (Done)       Learning Progress Summary            Patient Acceptance, E,D, DU by AV at 2/24/2025 1322                      Point: Home exercise program (Not Started)       Learner Progress:  Not documented in this visit.              Point: Body mechanics (Done)       Learning Progress Summary            Patient Acceptance, E,D, DU by AV at 2/24/2025 1322                      Point: Precautions (Done)       Learning Progress Summary            Patient Acceptance, E,D, DU by AV at 2/24/2025 1322                                      User Key       Initials Effective Dates Name Provider Type Discipline    AV 06/11/21 -  Twan Barnes, PT Physical Therapist PT                  PT Recommendation and Plan  Anticipated Discharge Disposition (PT): home with home health, home with assist  Planned Therapy Interventions (PT): balance training, bed mobility training, gait training, home exercise program, neuromuscular re-education, strengthening, transfer training  Therapy Frequency (PT): daily  Plan of Care Reviewed With: patient, spouse  Progress: no change  Outcome Evaluation: Patient presents with deficits in balance, endurance, transfers, and ambulation. Patient will benefit from skilled PT servies to address these mobility deficits and decrease risk of falls.   Outcome Measures       Row Name 02/24/25 1300             How much help from another person do you currently need...    Turning from your back to your side while in flat bed without using bedrails? 4  -AV      Moving from lying on back to sitting on the side of a flat bed without bedrails? 4  -AV      Moving to and from a bed to a chair (including a wheelchair)? 3  -AV      Standing up from a chair using your arms (e.g., wheelchair, bedside chair)? 3  -AV      Climbing 3-5 steps with a railing? 2  -AV       To walk in hospital room? 3  -AV      AM-PAC 6 Clicks Score (PT) 19  -AV         Functional Assessment    Outcome Measure Options AM-PAC 6 Clicks Basic Mobility (PT)  -AV                User Key  (r) = Recorded By, (t) = Taken By, (c) = Cosigned By      Initials Name Provider Type    AV Twan Barnes, PT Physical Therapist                     Time Calculation:    PT Charges       Row Name 02/24/25 1319             Time Calculation    PT Received On 02/24/25  -AV      PT Goal Re-Cert Due Date 03/05/25  -AV         Untimed Charges    PT Eval/Re-eval Minutes 50  -AV         Total Minutes    Untimed Charges Total Minutes 50  -AV       Total Minutes 50  -AV                User Key  (r) = Recorded By, (t) = Taken By, (c) = Cosigned By      Initials Name Provider Type    Twan Washington, PT Physical Therapist                  Therapy Charges for Today       Code Description Service Date Service Provider Modifiers Qty    92525666525 HC PT EVAL LOW COMPLEXITY 4 2/24/2025 Twan Barnes, PT GP 1            PT G-Codes  Outcome Measure Options: AM-PAC 6 Clicks Basic Mobility (PT)  AM-PAC 6 Clicks Score (PT): 19    Twan Barnes, PT  2/24/2025

## 2025-02-24 NOTE — SIGNIFICANT NOTE
02/24/25 1404   OTHER   Discipline occupational therapist   Rehab Time/Intention   Session Not Performed other (see comments)  (Pt is now pending hospital d/c.)

## 2025-02-24 NOTE — PROGRESS NOTES
[facility]     Progress Note    Patient Name: Ronnie Baron  : 1960  MRN: [id number]  Primary Care Physician:  Marj Nolasco APRN  Date of admission: 2025    Subjective   Subjective     HPI:  Patient Reports doing well this morning.  His pain is controlled.  He denies any chest pain or shortness of air.    Review of Systems   See HPI    Objective   Objective     Vitals:   Temp:  [97.3 °F (36.3 °C)-98.7 °F (37.1 °C)] 97.5 °F (36.4 °C)  Heart Rate:  [] 106  Resp:  [11-24] 18  BP: (104-172)/() 149/96  Flow (L/min) (Oxygen Therapy):  [2-6] 2  Physical Exam    General: Alert, no acute distress   Chest: Unlabored breathing, cardiovascular: Regular heart rate   Musculoskeletal: Neurovascular intact extremity.  Dressing intact.  Good capillary refill.    Result Review      WBC   Date Value Ref Range Status   2025 7.25 3.40 - 10.80 10*3/mm3 Final     RBC   Date Value Ref Range Status   2025 4.74 4.14 - 5.80 10*6/mm3 Final     Hemoglobin   Date Value Ref Range Status   2025 14.6 13.0 - 17.7 g/dL Final     Hematocrit   Date Value Ref Range Status   2025 44.7 37.5 - 51.0 % Final     MCV   Date Value Ref Range Status   2025 94.3 79.0 - 97.0 fL Final     MCH   Date Value Ref Range Status   2025 30.8 26.6 - 33.0 pg Final     MCHC   Date Value Ref Range Status   2025 32.7 31.5 - 35.7 g/dL Final     RDW   Date Value Ref Range Status   2025 13.2 12.3 - 15.4 % Final     RDW-SD   Date Value Ref Range Status   2025 46.0 37.0 - 54.0 fl Final     MPV   Date Value Ref Range Status   2025 10.3 6.0 - 12.0 fL Final     Platelets   Date Value Ref Range Status   2025 188 140 - 450 10*3/mm3 Final     Neutrophil %   Date Value Ref Range Status   2025 88.2 (H) 42.7 - 76.0 % Final     Lymphocyte %   Date Value Ref Range Status   2025 7.4 (L) 19.6 - 45.3 % Final     Monocyte %   Date Value Ref Range Status   2025 3.9 (L) 5.0 -  12.0 % Final     Eosinophil %   Date Value Ref Range Status   02/24/2025 0.0 (L) 0.3 - 6.2 % Final     Basophil %   Date Value Ref Range Status   02/24/2025 0.1 0.0 - 1.5 % Final     Immature Grans %   Date Value Ref Range Status   02/24/2025 0.4 0.0 - 0.5 % Final     Neutrophils, Absolute   Date Value Ref Range Status   02/24/2025 6.39 1.70 - 7.00 10*3/mm3 Final     Lymphocytes, Absolute   Date Value Ref Range Status   02/24/2025 0.54 (L) 0.70 - 3.10 10*3/mm3 Final     Monocytes, Absolute   Date Value Ref Range Status   02/24/2025 0.28 0.10 - 0.90 10*3/mm3 Final     Eosinophils, Absolute   Date Value Ref Range Status   02/24/2025 0.00 0.00 - 0.40 10*3/mm3 Final     Basophils, Absolute   Date Value Ref Range Status   02/24/2025 0.01 0.00 - 0.20 10*3/mm3 Final     Immature Grans, Absolute   Date Value Ref Range Status   02/24/2025 0.03 0.00 - 0.05 10*3/mm3 Final     nRBC   Date Value Ref Range Status   02/24/2025 0.0 0.0 - 0.2 /100 WBC Final        Result Review:  I have personally reviewed the results from the time of this admission to 2/24/2025 07:01 EST and agree with these findings:  [x]  Laboratory  []  Microbiology  [x]  Radiology  []  EKG/Telemetry   []  Cardiology/Vascular   []  Pathology  []  Old records  []  Other:      Assessment & Plan   Assessment / Plan     Brief Patient Summary:  Ronnie Baron is a 64 y.o. male who is status post left ankle ORIF    Active Hospital Problems:  Active Hospital Problems    Diagnosis    • **Ankle fracture    • Closed fracture of left distal tibia    • Closed fracture of left distal fibula      Plan: Nonweightbearing  Pain control  DVT prophylaxis  Physical and Occupational Therapy  Discharge planning: Hopefully home later today       VTE Prophylaxis:  Pharmacologic & mechanical VTE prophylaxis orders are present.        CODE STATUS:   Level Of Support Discussed With: Patient  Code Status (Patient has no pulse and is not breathing): CPR (Attempt to Resuscitate)  Medical  Interventions (Patient has pulse or is breathing): Full Support    Disposition:  I expect patient to be discharged later today if able.    Electronically signed by Morales Valdovinos MD, 02/24/25, 7:01 AM EST.

## 2025-02-24 NOTE — PLAN OF CARE
Goal Outcome Evaluation:  Plan of Care Reviewed With: patient, spouse        Progress: no change  Outcome Evaluation: Patient presents with deficits in balance, endurance, transfers, and ambulation. Patient will benefit from skilled PT servies to address these mobility deficits and decrease risk of falls.    Anticipated Discharge Disposition (PT): home with home health, home with assist

## 2025-02-25 ENCOUNTER — TRANSITIONAL CARE MANAGEMENT TELEPHONE ENCOUNTER (OUTPATIENT)
Dept: CALL CENTER | Facility: HOSPITAL | Age: 65
End: 2025-02-25
Payer: COMMERCIAL

## 2025-02-25 NOTE — OUTREACH NOTE
Call Center TCM Note      Flowsheet Row Responses   [facility] patient discharged from? [facility]   Does the patient have one of the following disease processes/diagnoses(primary or secondary)? General Surgery   TCM attempt successful? Yes   Call start time 1256   Call end time 1259   Person spoke with today (if not patient) and relationship wife   Meds reviewed with patient/caregiver? Yes   Is the patient having any side effects they believe may be caused by any medication additions or changes? No   Does the patient have all medications related to this admission filled (includes all antibiotics, pain medications, etc.) Yes   Is the patient taking all medications as directed (includes completed medication regime)? Yes   Comments Patient will followup with ortho on 3/10/2025   Does the patient have an appointment with their PCP within 7-14 days of discharge? Other   Nursing Interventions Patient desires to follow up with specialty only   Psychosocial issues? No   Did the patient receive a copy of their discharge instructions? Yes   Nursing interventions Reviewed instructions with patient   What is the patient's perception of their health status since discharge? Improving   Nursing interventions Nurse provided patient education   Is the patient/caregiver able to teach back signs and symptoms of incisional infection? Fever, Increased redness, swelling or pain at the incisonal site   Is the patient/caregiver able to teach back steps to recovery at home? Set small, achievable goals for return to baseline health, Rest and rebuild strength, gradually increase activity   Is the patient/caregiver able to teach back the hierarchy of who to call/visit for symptoms/problems? PCP, Specialist, Home health nurse, Urgent Care, ED, 911 Yes   TCM call completed? Yes   Wrap up additional comments Patient is doing well. He is getting use to walking on knee scooter. No needs at this time.   Call end time 1259   Would this patient  benefit from a Referral to [facility] Social Work? No   Is the patient interested in additional calls from an ambulatory ? No            Harjeet Horn RN    2/25/2025, 13:00 EST

## 2025-03-07 ENCOUNTER — READMISSION MANAGEMENT (OUTPATIENT)
Dept: CALL CENTER | Facility: HOSPITAL | Age: 65
End: 2025-03-07
Payer: COMMERCIAL

## 2025-03-07 NOTE — OUTREACH NOTE
General Surgery Week 2 Survey      Flowsheet Row Responses   [facility] patient discharged from? [facility]   Does the patient have one of the following disease processes/diagnoses(primary or secondary)? General Surgery   Week 2 attempt successful? Yes   Call start time 1510   Call end time 1511   Person spoke with today (if not patient) and relationship wife   Did the patient receive a copy of their discharge instructions? Yes   Nursing interventions Reviewed instructions with patient   What is the patient's perception of their health status since discharge? Improving   Is the patient/caregiver able to teach back the hierarchy of who to call/visit for symptoms/problems? PCP, Specialist, Home health nurse, Urgent Care, ED, 911 Yes   Week 2 call completed? Yes   Graduated Yes   Wrap up additional comments Sees Surgeon on Monday. Getting around on walker well. No concerns or questions noted.   Call end time 1511            Debbie MCDOWELL - Registered Nurse

## 2025-03-10 ENCOUNTER — OFFICE VISIT (OUTPATIENT)
Dept: ORTHOPEDIC SURGERY | Facility: CLINIC | Age: 65
End: 2025-03-10
Payer: COMMERCIAL

## 2025-03-10 VITALS
BODY MASS INDEX: 37.03 KG/M2 | HEIGHT: 69 IN | OXYGEN SATURATION: 93 % | WEIGHT: 250 LBS | HEART RATE: 120 BPM | DIASTOLIC BLOOD PRESSURE: 57 MMHG | SYSTOLIC BLOOD PRESSURE: 104 MMHG

## 2025-03-10 DIAGNOSIS — M25.572 LEFT ANKLE PAIN, UNSPECIFIED CHRONICITY: ICD-10-CM

## 2025-03-10 DIAGNOSIS — Z47.89 AFTERCARE FOLLOWING SURGERY OF THE MUSCULOSKELETAL SYSTEM: Primary | ICD-10-CM

## 2025-03-10 PROCEDURE — 99024 POSTOP FOLLOW-UP VISIT: CPT | Performed by: PHYSICIAN ASSISTANT

## 2025-03-10 NOTE — PROGRESS NOTES
Chief Complaint  Follow-up of the Left Ankle and Suture / Staple Removal    Subjective          History of Present Illness      Ronnie Baron is a 64 y.o. male  presents to [facility] for     Patient presents for 2-week postoperative evaluation of left ankle open reduction internal fixation trimalleolar ankle fracture, 2025 he is here with his wife Arielle.  Patient states he is managing recovery well he presented in his splint splint was removed staples were removed for x-rays and physical exam.  He admits to achiness in the ankle only.  He denies need for pain medication he states he has been elevating his ankle when at rest he is using a knee scooter for ambulation.  He denies fever or chills, denies calf pain.      No Known Allergies     Social History     Socioeconomic History    Marital status:      Spouse name: Arielle    Years of education: High school   Tobacco Use    Smoking status: Former     Current packs/day: 0.00     Average packs/day: 1 pack/day for 30.0 years (30.0 ttl pk-yrs)     Types: Cigarettes     Start date: 1972     Quit date: 1992     Years since quittin.2     Passive exposure: Past    Smokeless tobacco: Former     Quit date: 1990   Vaping Use    Vaping status: Never Used   Substance and Sexual Activity    Alcohol use: Yes     Comment: rare occasional just pleasure  none since 2016    Drug use: No    Sexual activity: Not Currently     Partners: Female     Birth control/protection: Other        REVIEW OF SYSTEMS    Constitutional: Awake alert and oriented x3, no acute distress, denies fevers, chills, weight loss  Respiratory: No respiratory distress  Vascular: Brisk cap refill, Intact distal pulses, No cyanosis, compartments soft with no signs or symptoms of compartment syndrome or DVT.   Cardiovascular: Denies chest pain, shortness of breath  Skin: Denies rashes, acute skin changes  Neurologic: Denies headache, loss of  "consciousness  MSK: Left ankle pain      Objective   Vital Signs:   /57   Pulse 120   Ht 175.3 cm (69.02\")   Wt 113 kg (250 lb)   SpO2 93%   BMI 36.90 kg/m²     Body mass index is 36.9 kg/m².    Physical Exam       Left ankle: Incision is healing well, no erythema, no drainage or dehiscence, no signs of infection mild generalized swelling to the ankle and foot resolving ecchymosis to the calf ankle and foot, sensation intact to light touch ankle range of motion limited secondary stiffness, patient able to wiggle toes sensation intact to light touch.  Nontender calf, negative [facility] testing      Procedures    Imaging Results (Most Recent)       Procedure Component Value Units Date/Time    XR Ankle 2 View Left [[id number]] Resulted: 03/10/25 1403     Updated: 03/10/25 1403    Narrative:      View:AP/Lateral view(s)  Site: Left ankle  Indication: Left ankle pain  Study: X-rays ordered, taken in the office, and reviewed today  X-ray: Good healing of fibula, medial malleolus and posterior malleolus   fractures, with intact plate and screws/syndesmosis fixation, no increased   displacement or angulation compared to previous studies, ankle mortise   intact  Comparative data: Previous studies             Result Review :   The following data was reviewed by: KEVIN Gregorio on 03/10/2025:  Data reviewed : Radiologic studies reviewed by me with the patient and his wife              Assessment and Plan    Diagnoses and all orders for this visit:    1. Aftercare following surgery of LEFT ANKLE OPEN REDUCTION INTERNAL FIXATION trimalleolar ankle fracture 2/23/25 (Primary)  -     Ambulatory Referral to Physical Therapy for Evaluation & Treatment    2. Left ankle pain, unspecified chronicity  -     XR Ankle 2 View Left  -     Ambulatory Referral to Physical Therapy for Evaluation & Treatment        Reviewed x-rays with the patient and his wife discussed diagnosis and treatment options with them.  Patient and " wife were advised patient would benefit from being placed into a tall boot we discussed only toe-touch weightbearing, he was given an order to start therapy he will go to [facility].  He can will call if he needs a pain medicine refill.  Sutures/staples removed in office today. Steri-strips applied. Discussed incision care/hygiene. May shower, but do not submerge in water until fully healed.  Advised patient that if any concerning symptoms regarding incision appearance occur that they should call us right away, patient expressed understanding.    Call or return if worsening symptoms.    Follow Up   Return in about 4 weeks (around 4/7/2025).  Patient was given instructions and counseling regarding his condition or for health maintenance advice. Please see specific information pulled into the AVS if appropriate.       EMR Dragon/Transcription disclaimer:  Part of this note may be an electronic transcription/translation of spoken language to printed text using the Dragon Dictation System

## 2025-03-11 LAB
QT INTERVAL: 352 MS
QTC INTERVAL: 467 MS

## 2025-03-21 ENCOUNTER — TELEPHONE (OUTPATIENT)
Dept: ORTHOPEDIC SURGERY | Facility: CLINIC | Age: 65
End: 2025-03-21
Payer: COMMERCIAL

## 2025-04-03 ENCOUNTER — OFFICE VISIT (OUTPATIENT)
Dept: ORTHOPEDIC SURGERY | Facility: CLINIC | Age: 65
End: 2025-04-03
Payer: COMMERCIAL

## 2025-04-03 VITALS — BODY MASS INDEX: 37.03 KG/M2 | HEIGHT: 69 IN | WEIGHT: 250 LBS | OXYGEN SATURATION: 97 % | HEART RATE: 103 BPM

## 2025-04-03 DIAGNOSIS — Z47.89 AFTERCARE FOLLOWING SURGERY OF THE MUSCULOSKELETAL SYSTEM: ICD-10-CM

## 2025-04-03 DIAGNOSIS — M25.572 LEFT ANKLE PAIN, UNSPECIFIED CHRONICITY: Primary | ICD-10-CM

## 2025-04-03 PROCEDURE — 99024 POSTOP FOLLOW-UP VISIT: CPT | Performed by: ORTHOPAEDIC SURGERY

## 2025-04-03 NOTE — PROGRESS NOTES
"Chief Complaint  Follow-up and Pain of the Left Ankle     Subjective      Ronnie Baron presents to [facility] for a follow up for his left ankle. He underwent a left ankle ORIF performed on 25. He presents today in a tall walking boot and ambulating with a knee scooter. He denies any new injury or falls since his last visit.     No Known Allergies     Social History     Socioeconomic History    Marital status:      Spouse name: Arielle    Years of education: High school   Tobacco Use    Smoking status: Former     Current packs/day: 0.00     Average packs/day: 1 pack/day for 30.0 years (30.0 ttl pk-yrs)     Types: Cigarettes     Start date: 1972     Quit date: 1992     Years since quittin.2     Passive exposure: Past    Smokeless tobacco: Former     Quit date: 1990   Vaping Use    Vaping status: Never Used   Substance and Sexual Activity    Alcohol use: Yes     Comment: rare occasional just pleasure  none since 2016    Drug use: No    Sexual activity: Not Currently     Partners: Female     Birth control/protection: Other        I reviewed the patient's chief complaint, history of present illness, review of systems, past medical history, surgical history, family history, social history, medications, and allergy list.     Review of Systems     Constitutional: Denies fevers, chills, weight loss  Cardiovascular: Denies chest pain, shortness of breath  Skin: Denies rashes, acute skin changes  Neurologic: Denies headache, loss of consciousness  MSK: left ankle pain       Vital Signs:   Pulse 103   Ht 175.3 cm (69\")   Wt 113 kg (250 lb)   SpO2 97%   BMI 36.92 kg/m²            Ortho Exam    Physical Exam  General:Alert. No acute distress     Left lower extremity: well healed surgicial incision, able to wiggle his toes, non tender, mild swelling, limited range of motion secondary to pain to the ankle, distal neurovascularly intact, positive  pulses, calf " soft, positive EHL, FHL, GS, and TA. Sensation intact to all 5 nerves of the foot.     Procedures    X-Ray Report:  Left ankle(s) X-Ray  Indication: Evaluation of left ankle pain   AP/Lateral view(s)  Findings: healing trimalleolar fracture, progressive healing    Prior studies available for comparison: yes       Imaging Results (Most Recent)       Procedure Component Value Units Date/Time    XR Ankle 2 View Left [[id number]] Resulted: 04/03/25 1454     Updated: 04/03/25 1456             Result Review :       XR Ankle 2 View Left  Result Date: 3/13/2025  Narrative: View:AP/Lateral view(s) Site: Left ankle Indication: Left ankle pain Study: X-rays ordered, taken in the office, and reviewed today X-ray: Good healing of fibula, medial malleolus and posterior malleolus fractures, with intact plate and screws/syndesmosis fixation, no increased displacement or angulation compared to previous studies, ankle mortise intact Comparative data: Previous studies             Assessment and Plan     Diagnoses and all orders for this visit:    1. Left ankle pain, unspecified chronicity (Primary)  -     XR Ankle 2 View Left    2. Aftercare following surgery of LEFT ANKLE OPEN REDUCTION INTERNAL FIXATION trimalleolar ankle fracture 2/23/25      The patient presents here today for a follow up for his left ankle.     He will continue the tall walking boot and can progress to weight bearing as tolerated in the boot.     He will continue outpatient physical therapy and current medications for pain control.     Dorsiflexion to 10 degrees, plantar flexion  to 40 degrees, inversion and eversion intact,     Call or return if worsening symptoms.    Follow Up     4 weeks     Will obtain X-Rays of left ankle at next visit.       Patient was given instructions and counseling regarding his condition or for health maintenance advice. Please see specific information pulled into the AVS if appropriate.     Scribed for Morales Valdovinos MD by  Liz Garay.  04/03/25   15:06 EDT    I have personally performed the services described in this document as scribed by the above individual and it is both accurate and complete. Morales Valdovinos MD 04/03/25

## 2025-04-17 ENCOUNTER — TELEPHONE (OUTPATIENT)
Dept: ORTHOPEDIC SURGERY | Facility: CLINIC | Age: 65
End: 2025-04-17
Payer: COMMERCIAL

## 2025-04-17 NOTE — TELEPHONE ENCOUNTER
PT'S WIFE CALLED. SHE SAID PT'S LEFT KNEE HAS STARTED TO SWELL AND FEELS LIKE IT'S FULL OF FLUID. I TOLD HER TO TRY ICE, COMPRESSION, AND ELEVATION. IF NO IMPROVEMENT BY MONDAY, PLEASE CALL US BACK.

## 2025-04-20 DIAGNOSIS — E03.9 HYPOTHYROIDISM, ACQUIRED: ICD-10-CM

## 2025-04-20 DIAGNOSIS — E78.2 MIXED HYPERLIPIDEMIA: Chronic | ICD-10-CM

## 2025-04-21 RX ORDER — LEVOTHYROXINE SODIUM 50 MCG
50 TABLET ORAL EVERY MORNING
Qty: 90 TABLET | Refills: 0 | Status: SHIPPED | OUTPATIENT
Start: 2025-04-21

## 2025-04-21 RX ORDER — ROSUVASTATIN CALCIUM 10 MG/1
10 TABLET, COATED ORAL DAILY
Qty: 90 TABLET | Refills: 0 | Status: SHIPPED | OUTPATIENT
Start: 2025-04-21

## 2025-05-05 ENCOUNTER — OFFICE VISIT (OUTPATIENT)
Dept: ORTHOPEDIC SURGERY | Facility: CLINIC | Age: 65
End: 2025-05-05
Payer: COMMERCIAL

## 2025-05-05 VITALS
BODY MASS INDEX: 37.03 KG/M2 | OXYGEN SATURATION: 93 % | HEART RATE: 101 BPM | DIASTOLIC BLOOD PRESSURE: 82 MMHG | HEIGHT: 69 IN | SYSTOLIC BLOOD PRESSURE: 131 MMHG | WEIGHT: 250 LBS

## 2025-05-05 DIAGNOSIS — M25.572 LEFT ANKLE PAIN, UNSPECIFIED CHRONICITY: ICD-10-CM

## 2025-05-05 DIAGNOSIS — Z47.89 AFTERCARE FOLLOWING SURGERY OF THE MUSCULOSKELETAL SYSTEM: Primary | ICD-10-CM

## 2025-05-05 PROCEDURE — 99024 POSTOP FOLLOW-UP VISIT: CPT | Performed by: PHYSICIAN ASSISTANT

## 2025-05-05 NOTE — PROGRESS NOTES
Chief Complaint  Follow-up of the Left Ankle    Subjective          History of Present Illness      Ronnie Baron is a 64 y.o. male  presents to [facility] for     Patient presents for follow-up evaluation of left ankle ORIF, 2025.  He saw Dr. Valdovinos for his last evaluation patient has been weightbearing in his boot he presents without a scooter, walker or crutches he states he has been trying to walk without the boot around his house.  He states pain is controlled he denies need for pain medicine he states his ankle swelling has come down.  He states he did not go to any physical therapy he has been doing his own home exercises.      No Known Allergies     Social History     Socioeconomic History    Marital status:      Spouse name: Arielle    Years of education: High school   Tobacco Use    Smoking status: Former     Current packs/day: 0.00     Average packs/day: 1 pack/day for 30.0 years (30.0 ttl pk-yrs)     Types: Cigarettes     Start date: 1972     Quit date: 1992     Years since quittin.3     Passive exposure: Past    Smokeless tobacco: Former     Quit date: 1990   Vaping Use    Vaping status: Never Used   Substance and Sexual Activity    Alcohol use: Yes     Comment: rare occasional just pleasure  none since 2016    Drug use: No    Sexual activity: Not Currently     Partners: Female     Birth control/protection: Other        REVIEW OF SYSTEMS    Constitutional: Awake alert and oriented x3, no acute distress, denies fevers, chills, weight loss  Respiratory: No respiratory distress  Vascular: Brisk cap refill, Intact distal pulses, No cyanosis, compartments soft with no signs or symptoms of compartment syndrome or DVT.   Cardiovascular: Denies chest pain, shortness of breath  Skin: Denies rashes, acute skin changes  Neurologic: Denies headache, loss of consciousness  MSK: Left ankle pain      Objective   Vital Signs:   /82   Pulse  "101   Ht 175.3 cm (69.02\")   Wt 113 kg (250 lb)   SpO2 93%   BMI 36.90 kg/m²     Body mass index is 36.9 kg/m².    Physical Exam       Left ankle: Incisions are well-healed, no erythema, no ecchymosis or swelling, no signs of infection, dorsiflexion 10 plantarflexion 30 stable to inversion eversion, neurovascularly intact, 2+ dorsalis pedis/posterior tibialis pulses nontender calf capillary refill less than 3 seconds      Procedures    Imaging Results (Most Recent)       Procedure Component Value Units Date/Time    XR Ankle 2 View Left [[id number]] Resulted: 05/05/25 0855     Updated: 05/05/25 0855    Narrative:      View:AP/Lateral view(s)  Site: Left ankle  Indication: Ankle pain  Study: X-rays ordered, taken in the office, and reviewed today  X-ray: Good healing of fibula and posterior ankle fractures with intact   plate and screws with syndesmosis fixation intact.  Ankle mortise intact   comparative data: Previous studies                   Assessment and Plan    Diagnoses and all orders for this visit:    1. Aftercare following surgery of LEFT ANKLE OPEN REDUCTION INTERNAL FIXATION trimalleolar ankle fracture 2/23/25 (Primary)    2. Left ankle pain, unspecified chronicity  -     XR Ankle 2 View Left        Reviewed x-rays with the patient and his family they were advised to continue weightbearing as tolerated he was given a lace up ankle brace to help transition from boot to brace over the next few weeks, continue home exercises follow-up in 4 weeks for recheck with x-rays    Call or return if worsening symptoms.    Follow Up   Return in about 4 weeks (around 6/2/2025) for Recheck.  Patient was given instructions and counseling regarding his condition or for health maintenance advice. Please see specific information pulled into the AVS if appropriate.       EMR Dragon/Transcription disclaimer:  Part of this note may be an electronic transcription/translation of spoken language to printed text using the Dragon " Dictation System

## 2025-05-13 ENCOUNTER — OFFICE VISIT (OUTPATIENT)
Dept: ORTHOPEDIC SURGERY | Facility: CLINIC | Age: 65
End: 2025-05-13
Payer: COMMERCIAL

## 2025-05-13 VITALS — WEIGHT: 250 LBS | HEIGHT: 69 IN | BODY MASS INDEX: 37.03 KG/M2

## 2025-05-13 DIAGNOSIS — M17.12 PRIMARY OSTEOARTHRITIS OF LEFT KNEE: ICD-10-CM

## 2025-05-13 DIAGNOSIS — M25.562 LEFT KNEE PAIN, UNSPECIFIED CHRONICITY: Primary | ICD-10-CM

## 2025-05-13 RX ORDER — LIDOCAINE HYDROCHLORIDE 10 MG/ML
5 INJECTION, SOLUTION INFILTRATION; PERINEURAL
Status: COMPLETED | OUTPATIENT
Start: 2025-05-13 | End: 2025-05-13

## 2025-05-13 RX ORDER — TRIAMCINOLONE ACETONIDE 40 MG/ML
40 INJECTION, SUSPENSION INTRA-ARTICULAR; INTRAMUSCULAR
Status: COMPLETED | OUTPATIENT
Start: 2025-05-13 | End: 2025-05-13

## 2025-05-13 RX ADMIN — TRIAMCINOLONE ACETONIDE 40 MG: 40 INJECTION, SUSPENSION INTRA-ARTICULAR; INTRAMUSCULAR at 11:40

## 2025-05-13 RX ADMIN — LIDOCAINE HYDROCHLORIDE 5 ML: 10 INJECTION, SOLUTION INFILTRATION; PERINEURAL at 11:40

## 2025-05-13 NOTE — PROGRESS NOTES
"Chief Complaint  Pain and Initial Evaluation of the Left Knee       Subjective      Ronnie Baron presents to [facility] for an evaluation  of his left knee. His left knee has been bothering him for several months but has gradually gotten worse since he had surgery on his left ankle. He states his left knee is swollen and has pain with prolonged walking and going up and stairs. He denies any specific injury, trauma, falls or prior surgery to his left knee.     No Known Allergies     Social History     Socioeconomic History    Marital status:      Spouse name: Arielle    Years of education: High school   Tobacco Use    Smoking status: Former     Current packs/day: 0.00     Average packs/day: 1 pack/day for 30.0 years (30.0 ttl pk-yrs)     Types: Cigarettes     Start date: 1972     Quit date: 1992     Years since quittin.3     Passive exposure: Past    Smokeless tobacco: Former     Quit date: 1990   Vaping Use    Vaping status: Never Used   Substance and Sexual Activity    Alcohol use: Yes     Comment: rare occasional just pleasure  none since 2016    Drug use: No    Sexual activity: Not Currently     Partners: Female     Birth control/protection: Other        I reviewed the patient's chief complaint, history of present illness, review of systems, past medical history, surgical history, family history, social history, medications, and allergy list.     Review of Systems     Constitutional: Denies fevers, chills, weight loss  Cardiovascular: Denies chest pain, shortness of breath  Skin: Denies rashes, acute skin changes  Neurologic: Denies headache, loss of consciousness  MSK: left knee pain       Vital Signs:   Ht 175.3 cm (69\")   Wt 113 kg (250 lb)   BMI 36.92 kg/m²            Ortho Exam    Physical Exam  General:Alert. No acute distress   Left lower extremity: small effusion, mild swelling, -5 degrees extension, flexion  to 130 degrees, non tender to the " medial joint line  and lateral joint line, stable to varus/valgus stress, stable to anterior/posterior drawer , negative  Lachman's and Zay's, calf soft, distal neurovascularly intact, positive  pulses, positive EHL, FHL, GS, and TA. Sensation intact to all 5 nerves of the foot.      Large Joint Arthrocentesis: L knee  Date/Time: 5/13/2025 11:40 AM  Consent given by: patient  Site marked: site marked  Timeout: Immediately prior to procedure a time out was called to verify the correct patient, procedure, equipment, support staff and site/side marked as required   Supporting Documentation  Indications: pain   Procedure Details  Location: knee - L knee  Needle size: 18 G  Medications administered: 5 mL lidocaine 1 %; 40 mg triamcinolone acetonide 40 MG/ML  Aspirate amount: 48 mL  Patient tolerance: patient tolerated the procedure well with no immediate complications    This injection documentation was Scribed for Morales Valdovinos MD by Martha Morton MA.  05/13/25   11:41 EDT    X-Ray Report:  Left knee X-Ray  Indication: Evaluation of left knee pain   AP/Lateral and Standing view(s)  Findings: moderately advanced degenerative changes to the left knee near bone on bone medial  compartment, no acute fracture, small effusion   Prior studies available for comparison: no       Imaging Results (Most Recent)       Procedure Component Value Units Date/Time    XR Knee 3 View Left [[id number]] Resulted: 05/13/25 1102     Updated: 05/13/25 1108             Result Review :       XR Ankle 2 View Left  Result Date: 5/6/2025  Narrative: View:AP/Lateral view(s) Site: Left ankle Indication: Ankle pain Study: X-rays ordered, taken in the office, and reviewed today X-ray: Good healing of fibula and posterior ankle fractures with intact plate and screws with syndesmosis fixation intact.  Ankle mortise intact comparative data: Previous studies             Assessment and Plan     Diagnoses and all orders for this visit:    1.  Left knee pain, unspecified chronicity (Primary)  -     XR Knee 3 View Left    2. Primary osteoarthritis of left knee      The patient presents here today for an evaluation  of his left knee. X-rays were obtained in the office today and these were reviewed today.     Discussed the risks and benefits of a left knee aspiration and steroid injection today in the office. Patient expressed understanding and wishes to proceed. Patient tolerted the injection well and without any complications. Aspirated 48 cc's of clear yellow joint fluid.      Home exercises given today and will continue current medications for pain control.      Call or return if worsening symptoms.    Follow Up     3 months     Patient was given instructions and counseling regarding his condition or for health maintenance advice. Please see specific information pulled into the AVS if appropriate.     Scribed for Morales Valdovinos MD by Liz Garay.  05/13/25   11:12 EDT  I have personally performed the services described in this document as scribed by the above individual and it is both accurate and complete. Morales Valdovinos MD 05/13/25

## 2025-06-25 ENCOUNTER — LAB (OUTPATIENT)
Dept: LAB | Facility: HOSPITAL | Age: 65
End: 2025-06-25
Payer: COMMERCIAL

## 2025-06-25 ENCOUNTER — TRANSCRIBE ORDERS (OUTPATIENT)
Dept: ADMINISTRATIVE | Facility: HOSPITAL | Age: 65
End: 2025-06-25
Payer: COMMERCIAL

## 2025-06-25 DIAGNOSIS — N18.30 STAGE 3 CHRONIC KIDNEY DISEASE, UNSPECIFIED WHETHER STAGE 3A OR 3B CKD: Primary | ICD-10-CM

## 2025-06-25 DIAGNOSIS — N18.30 STAGE 3 CHRONIC KIDNEY DISEASE, UNSPECIFIED WHETHER STAGE 3A OR 3B CKD: ICD-10-CM

## 2025-06-25 LAB
25(OH)D3 SERPL-MCNC: 46.2 NG/ML (ref 30–100)
ANION GAP SERPL CALCULATED.3IONS-SCNC: 11.9 MMOL/L (ref 5–15)
BASOPHILS # BLD AUTO: 0.05 10*3/MM3 (ref 0–0.2)
BASOPHILS NFR BLD AUTO: 0.9 % (ref 0–1.5)
BILIRUB UR QL STRIP: NEGATIVE
BUN SERPL-MCNC: 20 MG/DL (ref 8–23)
BUN/CREAT SERPL: 12.1 (ref 7–25)
CALCIUM SPEC-SCNC: 9.2 MG/DL (ref 8.6–10.5)
CHLORIDE SERPL-SCNC: 103 MMOL/L (ref 98–107)
CLARITY UR: CLEAR
CO2 SERPL-SCNC: 25.1 MMOL/L (ref 22–29)
COLOR UR: YELLOW
CREAT SERPL-MCNC: 1.65 MG/DL (ref 0.76–1.27)
CREAT UR-MCNC: 136.5 MG/DL
DEPRECATED RDW RBC AUTO: 42.1 FL (ref 37–54)
EGFRCR SERPLBLD CKD-EPI 2021: 46.4 ML/MIN/1.73
EOSINOPHIL # BLD AUTO: 0.12 10*3/MM3 (ref 0–0.4)
EOSINOPHIL NFR BLD AUTO: 2.3 % (ref 0.3–6.2)
ERYTHROCYTE [DISTWIDTH] IN BLOOD BY AUTOMATED COUNT: 12.7 % (ref 12.3–15.4)
GLUCOSE SERPL-MCNC: 106 MG/DL (ref 65–99)
GLUCOSE UR STRIP-MCNC: ABNORMAL MG/DL
HCT VFR BLD AUTO: 48.2 % (ref 37.5–51)
HGB BLD-MCNC: 16.2 G/DL (ref 13–17.7)
HGB UR QL STRIP.AUTO: NEGATIVE
HOLD SPECIMEN: NORMAL
IMM GRANULOCYTES # BLD AUTO: 0.02 10*3/MM3 (ref 0–0.05)
IMM GRANULOCYTES NFR BLD AUTO: 0.4 % (ref 0–0.5)
KETONES UR QL STRIP: NEGATIVE
LEUKOCYTE ESTERASE UR QL STRIP.AUTO: NEGATIVE
LYMPHOCYTES # BLD AUTO: 1.94 10*3/MM3 (ref 0.7–3.1)
LYMPHOCYTES NFR BLD AUTO: 36.5 % (ref 19.6–45.3)
MCH RBC QN AUTO: 30.7 PG (ref 26.6–33)
MCHC RBC AUTO-ENTMCNC: 33.6 G/DL (ref 31.5–35.7)
MCV RBC AUTO: 91.3 FL (ref 79–97)
MONOCYTES # BLD AUTO: 0.43 10*3/MM3 (ref 0.1–0.9)
MONOCYTES NFR BLD AUTO: 8.1 % (ref 5–12)
NEUTROPHILS NFR BLD AUTO: 2.75 10*3/MM3 (ref 1.7–7)
NEUTROPHILS NFR BLD AUTO: 51.8 % (ref 42.7–76)
NITRITE UR QL STRIP: NEGATIVE
NRBC BLD AUTO-RTO: 0 /100 WBC (ref 0–0.2)
PH UR STRIP.AUTO: 6 [PH] (ref 5–8)
PLATELET # BLD AUTO: 208 10*3/MM3 (ref 140–450)
PMV BLD AUTO: 10.6 FL (ref 6–12)
POTASSIUM SERPL-SCNC: 4.4 MMOL/L (ref 3.5–5.2)
PROT ?TM UR-MCNC: 10.9 MG/DL
PROT UR QL STRIP: NEGATIVE
PROT/CREAT UR: 0.08 MG/G{CREAT}
RBC # BLD AUTO: 5.28 10*6/MM3 (ref 4.14–5.8)
SODIUM SERPL-SCNC: 140 MMOL/L (ref 136–145)
SP GR UR STRIP: 1.03 (ref 1–1.03)
UROBILINOGEN UR QL STRIP: ABNORMAL
WBC NRBC COR # BLD AUTO: 5.31 10*3/MM3 (ref 3.4–10.8)

## 2025-06-25 PROCEDURE — 82570 ASSAY OF URINE CREATININE: CPT

## 2025-06-25 PROCEDURE — 84156 ASSAY OF PROTEIN URINE: CPT

## 2025-06-25 PROCEDURE — 81003 URINALYSIS AUTO W/O SCOPE: CPT

## 2025-06-25 PROCEDURE — 85025 COMPLETE CBC W/AUTO DIFF WBC: CPT

## 2025-06-25 PROCEDURE — 36415 COLL VENOUS BLD VENIPUNCTURE: CPT

## 2025-06-25 PROCEDURE — 80048 BASIC METABOLIC PNL TOTAL CA: CPT

## 2025-06-25 PROCEDURE — 82306 VITAMIN D 25 HYDROXY: CPT

## 2025-08-12 ENCOUNTER — TRANSCRIBE ORDERS (OUTPATIENT)
Dept: ADMINISTRATIVE | Facility: HOSPITAL | Age: 65
End: 2025-08-12
Payer: COMMERCIAL

## 2025-08-12 ENCOUNTER — LAB (OUTPATIENT)
Dept: LAB | Facility: HOSPITAL | Age: 65
End: 2025-08-12
Payer: COMMERCIAL

## 2025-08-12 DIAGNOSIS — R97.20 ELEVATED PROSTATE SPECIFIC ANTIGEN (PSA): Primary | ICD-10-CM

## 2025-08-12 DIAGNOSIS — R97.20 ELEVATED PROSTATE SPECIFIC ANTIGEN (PSA): ICD-10-CM

## 2025-08-12 LAB — PSA SERPL-MCNC: 3.08 NG/ML (ref 0–4)

## 2025-08-12 PROCEDURE — 84153 ASSAY OF PSA TOTAL: CPT

## 2025-08-12 PROCEDURE — 36415 COLL VENOUS BLD VENIPUNCTURE: CPT

## 2025-08-19 ENCOUNTER — OFFICE VISIT (OUTPATIENT)
Dept: UROLOGY | Age: 65
End: 2025-08-19
Payer: COMMERCIAL

## 2025-08-19 VITALS — WEIGHT: 250 LBS | HEIGHT: 69 IN | BODY MASS INDEX: 37.03 KG/M2

## 2025-08-19 DIAGNOSIS — N52.01 ERECTILE DYSFUNCTION DUE TO ARTERIAL INSUFFICIENCY: ICD-10-CM

## 2025-08-19 DIAGNOSIS — N40.1 BPH WITH URINARY OBSTRUCTION: Primary | ICD-10-CM

## 2025-08-19 DIAGNOSIS — N13.8 BPH WITH URINARY OBSTRUCTION: Primary | ICD-10-CM

## 2025-08-19 DIAGNOSIS — R97.20 ELEVATED PROSTATE SPECIFIC ANTIGEN (PSA): ICD-10-CM

## 2025-08-19 LAB — URINE VOLUME: NORMAL

## 2025-08-19 PROCEDURE — 99214 OFFICE O/P EST MOD 30 MIN: CPT | Performed by: UROLOGY

## 2025-08-19 PROCEDURE — 51798 US URINE CAPACITY MEASURE: CPT | Performed by: UROLOGY

## 2025-08-19 RX ORDER — TADALAFIL 5 MG/1
5 TABLET ORAL DAILY PRN
Qty: 90 TABLET | Refills: 2 | Status: SHIPPED | OUTPATIENT
Start: 2025-08-19

## 2025-08-19 RX ORDER — TAMSULOSIN HYDROCHLORIDE 0.4 MG/1
1 CAPSULE ORAL EVERY EVENING
Qty: 90 CAPSULE | Refills: 2 | Status: SHIPPED | OUTPATIENT
Start: 2025-08-19

## (undated) DEVICE — SUT VIC 2/0 CT1 36IN

## (undated) DEVICE — EXTREMITY-LF: Brand: MEDLINE INDUSTRIES, INC.

## (undated) DEVICE — INTENDED FOR TISSUE SEPARATION, AND OTHER PROCEDURES THAT REQUIRE A SHARP SURGICAL BLADE TO PUNCTURE OR CUT.: Brand: BARD-PARKER ® CARBON RIB-BACK BLADES

## (undated) DEVICE — DISPOSABLE TOURNIQUET CUFF SINGLE BLADDER, SINGLE PORT AND QUICK CONNECT CONNECTOR: Brand: COLOR CUFF

## (undated) DEVICE — APPL CHLORAPREP HI/LITE 26ML ORNG

## (undated) DEVICE — 450 ML BOTTLE OF 0.05% CHLORHEXIDINE GLUCONATE IN 99.95% STERILE WATER FOR IRRIGATION, USP AND APPLICATOR.: Brand: IRRISEPT ANTIMICROBIAL WOUND LAVAGE

## (undated) DEVICE — UNDERCAST PADDING: Brand: DEROYAL

## (undated) DEVICE — GLV SURG SENSICARE PI ORTHO SZ8 LF STRL

## (undated) DEVICE — BNDG ESMARK STRL 6INX12FT LF

## (undated) DEVICE — DRILL BIT, AO DIA2.0MM X 135MM, SCALED: Brand: VARIAX

## (undated) DEVICE — BONE SCREW
Type: IMPLANTABLE DEVICE | Site: ANKLE | Status: NON-FUNCTIONAL
Brand: VARIAX
Removed: 2025-02-23

## (undated) DEVICE — OVERDRILL AO, DIA2.7MM X 122MM: Brand: VARIAX

## (undated) DEVICE — DRESSING,GAUZE,XEROFORM,CURAD,1"X8",ST: Brand: CURAD

## (undated) DEVICE — GAUZE,SPONGE,4"X4",16PLY,STRL,LF,10/TRAY: Brand: MEDLINE

## (undated) DEVICE — SUT ETHLN 3-0 FS118IN 663H

## (undated) DEVICE — COVER,C-ARM,41X74: Brand: MEDLINE

## (undated) DEVICE — BNDG ELAS ECON W/CLIP 4IN 5YD LF STRL

## (undated) DEVICE — BNDG ELAS ECON W/CLIP 6IN 5YD LF STRL

## (undated) DEVICE — DRSNG PAD ABD 8X10IN STRL

## (undated) DEVICE — THE STERILE LIGHT HANDLE COVER IS USED WITH STERIS SURGICAL LIGHTING AND VISUALIZATION SYSTEMS.

## (undated) DEVICE — PENCL SMOKE/EVAC MEGADYNE TELESCP 10FT

## (undated) DEVICE — ANTIBACTERIAL VIOLET BRAIDED (POLYGLACTIN 910), SYNTHETIC ABSORBABLE SUTURE: Brand: COATED VICRYL

## (undated) DEVICE — KENDALL SCD EXPRESS SLEEVES, KNEE LENGTH, MEDIUM: Brand: KENDALL SCD

## (undated) DEVICE — SCREWDRIVER BLADE T10 AO, SELF RETAINING: Brand: VARIAX

## (undated) DEVICE — GLOVE,SURG,SENSICARE SLT,LF,PF,7.5: Brand: MEDLINE

## (undated) DEVICE — DRILL BIT, AO DIA2.6MM X 135MM, SCALED: Brand: VARIAX

## (undated) DEVICE — SYR LL TP 10ML STRL